# Patient Record
Sex: FEMALE | Race: WHITE | NOT HISPANIC OR LATINO
[De-identification: names, ages, dates, MRNs, and addresses within clinical notes are randomized per-mention and may not be internally consistent; named-entity substitution may affect disease eponyms.]

---

## 2018-01-31 PROBLEM — Z00.00 ENCOUNTER FOR PREVENTIVE HEALTH EXAMINATION: Status: ACTIVE | Noted: 2018-01-31

## 2018-02-20 ENCOUNTER — APPOINTMENT (OUTPATIENT)
Dept: ORTHOPEDIC SURGERY | Facility: CLINIC | Age: 78
End: 2018-02-20
Payer: COMMERCIAL

## 2018-02-20 VITALS — WEIGHT: 185 LBS | BODY MASS INDEX: 36.32 KG/M2 | HEIGHT: 60 IN

## 2018-02-20 DIAGNOSIS — Z87.891 PERSONAL HISTORY OF NICOTINE DEPENDENCE: ICD-10-CM

## 2018-02-20 DIAGNOSIS — Z85.9 PERSONAL HISTORY OF MALIGNANT NEOPLASM, UNSPECIFIED: ICD-10-CM

## 2018-02-20 DIAGNOSIS — M12.812 OTHER SPECIFIC ARTHROPATHIES, NOT ELSEWHERE CLASSIFIED, LEFT SHOULDER: ICD-10-CM

## 2018-02-20 DIAGNOSIS — Z87.39 PERSONAL HISTORY OF OTHER DISEASES OF THE MUSCULOSKELETAL SYSTEM AND CONNECTIVE TISSUE: ICD-10-CM

## 2018-02-20 DIAGNOSIS — Z86.39 PERSONAL HISTORY OF OTHER ENDOCRINE, NUTRITIONAL AND METABOLIC DISEASE: ICD-10-CM

## 2018-02-20 DIAGNOSIS — Z80.9 FAMILY HISTORY OF MALIGNANT NEOPLASM, UNSPECIFIED: ICD-10-CM

## 2018-02-20 DIAGNOSIS — Z82.62 FAMILY HISTORY OF OSTEOPOROSIS: ICD-10-CM

## 2018-02-20 DIAGNOSIS — Z82.61 FAMILY HISTORY OF ARTHRITIS: ICD-10-CM

## 2018-02-20 DIAGNOSIS — Z78.9 OTHER SPECIFIED HEALTH STATUS: ICD-10-CM

## 2018-02-20 PROCEDURE — 99204 OFFICE O/P NEW MOD 45 MIN: CPT

## 2018-02-20 RX ORDER — ZOLPIDEM TARTRATE 5 MG/1
5 TABLET ORAL
Refills: 0 | Status: ACTIVE | COMMUNITY

## 2018-02-20 RX ORDER — PANTOPRAZOLE 40 MG/1
TABLET, DELAYED RELEASE ORAL
Refills: 0 | Status: ACTIVE | COMMUNITY

## 2018-02-20 RX ORDER — SODIUM CHLORIDE 50 MG/G
5 OINTMENT OPHTHALMIC
Refills: 0 | Status: ACTIVE | COMMUNITY

## 2018-02-20 RX ORDER — ATORVASTATIN CALCIUM 10 MG/1
10 TABLET, FILM COATED ORAL
Refills: 0 | Status: ACTIVE | COMMUNITY

## 2018-02-20 RX ORDER — TRIAMTERENE AND HYDROCHLOROTHIAZIDE 25; 37.5 MG/1; MG/1
37.5-25 TABLET ORAL
Refills: 0 | Status: ACTIVE | COMMUNITY

## 2018-02-20 RX ORDER — UBIDECARENONE/VIT E ACET 100MG-5
50 MCG CAPSULE ORAL
Refills: 0 | Status: ACTIVE | COMMUNITY

## 2018-02-20 RX ORDER — ASPIRIN 81 MG
81 TABLET,CHEWABLE ORAL
Refills: 0 | Status: ACTIVE | COMMUNITY

## 2018-02-20 RX ORDER — ESCITALOPRAM OXALATE 20 MG/1
20 TABLET ORAL
Refills: 0 | Status: ACTIVE | COMMUNITY

## 2018-02-21 PROBLEM — M12.812 ROTATOR CUFF TEAR ARTHROPATHY, LEFT: Status: ACTIVE | Noted: 2018-02-21

## 2019-08-19 ENCOUNTER — APPOINTMENT (OUTPATIENT)
Dept: ORTHOPEDIC SURGERY | Facility: CLINIC | Age: 79
End: 2019-08-19
Payer: COMMERCIAL

## 2019-08-19 VITALS — HEART RATE: 74 BPM | DIASTOLIC BLOOD PRESSURE: 77 MMHG | SYSTOLIC BLOOD PRESSURE: 127 MMHG

## 2019-08-19 DIAGNOSIS — M79.652 PAIN IN LEFT THIGH: ICD-10-CM

## 2019-08-19 DIAGNOSIS — Z96.652 PRESENCE OF LEFT ARTIFICIAL KNEE JOINT: ICD-10-CM

## 2019-08-19 PROCEDURE — 99213 OFFICE O/P EST LOW 20 MIN: CPT

## 2019-08-19 PROCEDURE — 73501 X-RAY EXAM HIP UNI 1 VIEW: CPT | Mod: 59

## 2019-08-19 PROCEDURE — 73562 X-RAY EXAM OF KNEE 3: CPT | Mod: LT

## 2019-08-19 PROCEDURE — 73552 X-RAY EXAM OF FEMUR 2/>: CPT | Mod: LT

## 2023-12-28 ENCOUNTER — INPATIENT (INPATIENT)
Facility: HOSPITAL | Age: 83
LOS: 1 days | Discharge: HOME | DRG: 312 | End: 2023-12-30
Attending: INTERNAL MEDICINE | Admitting: INTERNAL MEDICINE
Payer: MEDICARE

## 2023-12-28 VITALS
HEART RATE: 96 BPM | WEIGHT: 175.05 LBS | RESPIRATION RATE: 19 BRPM | DIASTOLIC BLOOD PRESSURE: 54 MMHG | SYSTOLIC BLOOD PRESSURE: 143 MMHG | OXYGEN SATURATION: 98 % | HEIGHT: 60 IN | TEMPERATURE: 98 F

## 2023-12-28 DIAGNOSIS — R55 SYNCOPE AND COLLAPSE: ICD-10-CM

## 2023-12-28 DIAGNOSIS — Z90.710 ACQUIRED ABSENCE OF BOTH CERVIX AND UTERUS: Chronic | ICD-10-CM

## 2023-12-28 DIAGNOSIS — Z96.619 PRESENCE OF UNSPECIFIED ARTIFICIAL SHOULDER JOINT: Chronic | ICD-10-CM

## 2023-12-28 DIAGNOSIS — R29.6 REPEATED FALLS: ICD-10-CM

## 2023-12-28 DIAGNOSIS — Z96.659 PRESENCE OF UNSPECIFIED ARTIFICIAL KNEE JOINT: Chronic | ICD-10-CM

## 2023-12-28 DIAGNOSIS — I10 ESSENTIAL (PRIMARY) HYPERTENSION: ICD-10-CM

## 2023-12-28 LAB
ALBUMIN SERPL ELPH-MCNC: 3.5 G/DL — SIGNIFICANT CHANGE UP (ref 3.3–5)
ALBUMIN SERPL ELPH-MCNC: 3.5 G/DL — SIGNIFICANT CHANGE UP (ref 3.3–5)
ALP SERPL-CCNC: 89 U/L — SIGNIFICANT CHANGE UP (ref 40–120)
ALP SERPL-CCNC: 89 U/L — SIGNIFICANT CHANGE UP (ref 40–120)
ALT FLD-CCNC: 16 U/L — SIGNIFICANT CHANGE UP (ref 10–45)
ALT FLD-CCNC: 16 U/L — SIGNIFICANT CHANGE UP (ref 10–45)
ANION GAP SERPL CALC-SCNC: 10 MMOL/L — SIGNIFICANT CHANGE UP (ref 5–17)
ANION GAP SERPL CALC-SCNC: 10 MMOL/L — SIGNIFICANT CHANGE UP (ref 5–17)
APTT BLD: 32.8 SEC — SIGNIFICANT CHANGE UP (ref 24.5–35.6)
APTT BLD: 32.8 SEC — SIGNIFICANT CHANGE UP (ref 24.5–35.6)
AST SERPL-CCNC: 18 U/L — SIGNIFICANT CHANGE UP (ref 10–40)
AST SERPL-CCNC: 18 U/L — SIGNIFICANT CHANGE UP (ref 10–40)
BASE EXCESS BLDV CALC-SCNC: 2.7 MMOL/L — SIGNIFICANT CHANGE UP (ref -2–3)
BASE EXCESS BLDV CALC-SCNC: 2.7 MMOL/L — SIGNIFICANT CHANGE UP (ref -2–3)
BASOPHILS # BLD AUTO: 0.04 K/UL — SIGNIFICANT CHANGE UP (ref 0–0.2)
BASOPHILS # BLD AUTO: 0.04 K/UL — SIGNIFICANT CHANGE UP (ref 0–0.2)
BASOPHILS NFR BLD AUTO: 0.4 % — SIGNIFICANT CHANGE UP (ref 0–2)
BASOPHILS NFR BLD AUTO: 0.4 % — SIGNIFICANT CHANGE UP (ref 0–2)
BILIRUB SERPL-MCNC: 0.5 MG/DL — SIGNIFICANT CHANGE UP (ref 0.2–1.2)
BILIRUB SERPL-MCNC: 0.5 MG/DL — SIGNIFICANT CHANGE UP (ref 0.2–1.2)
BUN SERPL-MCNC: 25 MG/DL — HIGH (ref 7–23)
BUN SERPL-MCNC: 25 MG/DL — HIGH (ref 7–23)
CA-I SERPL-SCNC: 1.2 MMOL/L — SIGNIFICANT CHANGE UP (ref 1.15–1.33)
CA-I SERPL-SCNC: 1.2 MMOL/L — SIGNIFICANT CHANGE UP (ref 1.15–1.33)
CALCIUM SERPL-MCNC: 9.1 MG/DL — SIGNIFICANT CHANGE UP (ref 8.4–10.5)
CALCIUM SERPL-MCNC: 9.1 MG/DL — SIGNIFICANT CHANGE UP (ref 8.4–10.5)
CHLORIDE BLDV-SCNC: 104 MMOL/L — SIGNIFICANT CHANGE UP (ref 96–108)
CHLORIDE BLDV-SCNC: 104 MMOL/L — SIGNIFICANT CHANGE UP (ref 96–108)
CHLORIDE SERPL-SCNC: 104 MMOL/L — SIGNIFICANT CHANGE UP (ref 96–108)
CHLORIDE SERPL-SCNC: 104 MMOL/L — SIGNIFICANT CHANGE UP (ref 96–108)
CO2 BLDV-SCNC: 29 MMOL/L — HIGH (ref 22–26)
CO2 BLDV-SCNC: 29 MMOL/L — HIGH (ref 22–26)
CO2 SERPL-SCNC: 27 MMOL/L — SIGNIFICANT CHANGE UP (ref 22–31)
CO2 SERPL-SCNC: 27 MMOL/L — SIGNIFICANT CHANGE UP (ref 22–31)
CREAT SERPL-MCNC: 0.74 MG/DL — SIGNIFICANT CHANGE UP (ref 0.5–1.3)
CREAT SERPL-MCNC: 0.74 MG/DL — SIGNIFICANT CHANGE UP (ref 0.5–1.3)
EGFR: 81 ML/MIN/1.73M2 — SIGNIFICANT CHANGE UP
EGFR: 81 ML/MIN/1.73M2 — SIGNIFICANT CHANGE UP
EOSINOPHIL # BLD AUTO: 0.04 K/UL — SIGNIFICANT CHANGE UP (ref 0–0.5)
EOSINOPHIL # BLD AUTO: 0.04 K/UL — SIGNIFICANT CHANGE UP (ref 0–0.5)
EOSINOPHIL NFR BLD AUTO: 0.4 % — SIGNIFICANT CHANGE UP (ref 0–6)
EOSINOPHIL NFR BLD AUTO: 0.4 % — SIGNIFICANT CHANGE UP (ref 0–6)
FLUAV AG NPH QL: SIGNIFICANT CHANGE UP
FLUAV AG NPH QL: SIGNIFICANT CHANGE UP
FLUBV AG NPH QL: SIGNIFICANT CHANGE UP
FLUBV AG NPH QL: SIGNIFICANT CHANGE UP
GAS PNL BLDV: 136 MMOL/L — SIGNIFICANT CHANGE UP (ref 136–145)
GAS PNL BLDV: 136 MMOL/L — SIGNIFICANT CHANGE UP (ref 136–145)
GAS PNL BLDV: SIGNIFICANT CHANGE UP
GAS PNL BLDV: SIGNIFICANT CHANGE UP
GLUCOSE BLDV-MCNC: 121 MG/DL — HIGH (ref 70–99)
GLUCOSE BLDV-MCNC: 121 MG/DL — HIGH (ref 70–99)
GLUCOSE SERPL-MCNC: 124 MG/DL — HIGH (ref 70–99)
GLUCOSE SERPL-MCNC: 124 MG/DL — HIGH (ref 70–99)
HCO3 BLDV-SCNC: 28 MMOL/L — SIGNIFICANT CHANGE UP (ref 22–29)
HCO3 BLDV-SCNC: 28 MMOL/L — SIGNIFICANT CHANGE UP (ref 22–29)
HCT VFR BLD CALC: 30.8 % — LOW (ref 34.5–45)
HCT VFR BLD CALC: 30.8 % — LOW (ref 34.5–45)
HCT VFR BLDA CALC: 30 % — LOW (ref 34.5–46.5)
HCT VFR BLDA CALC: 30 % — LOW (ref 34.5–46.5)
HGB BLD CALC-MCNC: 10.1 G/DL — LOW (ref 11.7–16.1)
HGB BLD CALC-MCNC: 10.1 G/DL — LOW (ref 11.7–16.1)
HGB BLD-MCNC: 10.3 G/DL — LOW (ref 11.5–15.5)
HGB BLD-MCNC: 10.3 G/DL — LOW (ref 11.5–15.5)
IMM GRANULOCYTES NFR BLD AUTO: 0.7 % — SIGNIFICANT CHANGE UP (ref 0–0.9)
IMM GRANULOCYTES NFR BLD AUTO: 0.7 % — SIGNIFICANT CHANGE UP (ref 0–0.9)
INR BLD: 1.06 RATIO — SIGNIFICANT CHANGE UP (ref 0.85–1.18)
INR BLD: 1.06 RATIO — SIGNIFICANT CHANGE UP (ref 0.85–1.18)
LACTATE BLDV-MCNC: 1.5 MMOL/L — SIGNIFICANT CHANGE UP (ref 0.5–2)
LACTATE BLDV-MCNC: 1.5 MMOL/L — SIGNIFICANT CHANGE UP (ref 0.5–2)
LYMPHOCYTES # BLD AUTO: 1 K/UL — SIGNIFICANT CHANGE UP (ref 1–3.3)
LYMPHOCYTES # BLD AUTO: 1 K/UL — SIGNIFICANT CHANGE UP (ref 1–3.3)
LYMPHOCYTES # BLD AUTO: 11.1 % — LOW (ref 13–44)
LYMPHOCYTES # BLD AUTO: 11.1 % — LOW (ref 13–44)
MAGNESIUM SERPL-MCNC: 2 MG/DL — SIGNIFICANT CHANGE UP (ref 1.6–2.6)
MAGNESIUM SERPL-MCNC: 2 MG/DL — SIGNIFICANT CHANGE UP (ref 1.6–2.6)
MCHC RBC-ENTMCNC: 28.8 PG — SIGNIFICANT CHANGE UP (ref 27–34)
MCHC RBC-ENTMCNC: 28.8 PG — SIGNIFICANT CHANGE UP (ref 27–34)
MCHC RBC-ENTMCNC: 33.4 GM/DL — SIGNIFICANT CHANGE UP (ref 32–36)
MCHC RBC-ENTMCNC: 33.4 GM/DL — SIGNIFICANT CHANGE UP (ref 32–36)
MCV RBC AUTO: 86 FL — SIGNIFICANT CHANGE UP (ref 80–100)
MCV RBC AUTO: 86 FL — SIGNIFICANT CHANGE UP (ref 80–100)
MONOCYTES # BLD AUTO: 0.53 K/UL — SIGNIFICANT CHANGE UP (ref 0–0.9)
MONOCYTES # BLD AUTO: 0.53 K/UL — SIGNIFICANT CHANGE UP (ref 0–0.9)
MONOCYTES NFR BLD AUTO: 5.9 % — SIGNIFICANT CHANGE UP (ref 2–14)
MONOCYTES NFR BLD AUTO: 5.9 % — SIGNIFICANT CHANGE UP (ref 2–14)
NEUTROPHILS # BLD AUTO: 7.34 K/UL — SIGNIFICANT CHANGE UP (ref 1.8–7.4)
NEUTROPHILS # BLD AUTO: 7.34 K/UL — SIGNIFICANT CHANGE UP (ref 1.8–7.4)
NEUTROPHILS NFR BLD AUTO: 81.5 % — HIGH (ref 43–77)
NEUTROPHILS NFR BLD AUTO: 81.5 % — HIGH (ref 43–77)
NRBC # BLD: 0 /100 WBCS — SIGNIFICANT CHANGE UP (ref 0–0)
NRBC # BLD: 0 /100 WBCS — SIGNIFICANT CHANGE UP (ref 0–0)
NT-PROBNP SERPL-SCNC: 667 PG/ML — HIGH (ref 0–300)
NT-PROBNP SERPL-SCNC: 667 PG/ML — HIGH (ref 0–300)
PCO2 BLDV: 45 MMHG — HIGH (ref 39–42)
PCO2 BLDV: 45 MMHG — HIGH (ref 39–42)
PH BLDV: 7.4 — SIGNIFICANT CHANGE UP (ref 7.32–7.43)
PH BLDV: 7.4 — SIGNIFICANT CHANGE UP (ref 7.32–7.43)
PHOSPHATE SERPL-MCNC: 3.9 MG/DL — SIGNIFICANT CHANGE UP (ref 2.5–4.5)
PHOSPHATE SERPL-MCNC: 3.9 MG/DL — SIGNIFICANT CHANGE UP (ref 2.5–4.5)
PLATELET # BLD AUTO: 189 K/UL — SIGNIFICANT CHANGE UP (ref 150–400)
PLATELET # BLD AUTO: 189 K/UL — SIGNIFICANT CHANGE UP (ref 150–400)
PO2 BLDV: 50 MMHG — HIGH (ref 25–45)
PO2 BLDV: 50 MMHG — HIGH (ref 25–45)
POTASSIUM BLDV-SCNC: 4 MMOL/L — SIGNIFICANT CHANGE UP (ref 3.5–5.1)
POTASSIUM BLDV-SCNC: 4 MMOL/L — SIGNIFICANT CHANGE UP (ref 3.5–5.1)
POTASSIUM SERPL-MCNC: 4 MMOL/L — SIGNIFICANT CHANGE UP (ref 3.5–5.3)
POTASSIUM SERPL-MCNC: 4 MMOL/L — SIGNIFICANT CHANGE UP (ref 3.5–5.3)
POTASSIUM SERPL-SCNC: 4 MMOL/L — SIGNIFICANT CHANGE UP (ref 3.5–5.3)
POTASSIUM SERPL-SCNC: 4 MMOL/L — SIGNIFICANT CHANGE UP (ref 3.5–5.3)
PROT SERPL-MCNC: 5.5 G/DL — LOW (ref 6–8.3)
PROT SERPL-MCNC: 5.5 G/DL — LOW (ref 6–8.3)
PROTHROM AB SERPL-ACNC: 11.1 SEC — SIGNIFICANT CHANGE UP (ref 9.5–13)
PROTHROM AB SERPL-ACNC: 11.1 SEC — SIGNIFICANT CHANGE UP (ref 9.5–13)
RBC # BLD: 3.58 M/UL — LOW (ref 3.8–5.2)
RBC # BLD: 3.58 M/UL — LOW (ref 3.8–5.2)
RBC # FLD: 13.7 % — SIGNIFICANT CHANGE UP (ref 10.3–14.5)
RBC # FLD: 13.7 % — SIGNIFICANT CHANGE UP (ref 10.3–14.5)
RSV RNA NPH QL NAA+NON-PROBE: SIGNIFICANT CHANGE UP
RSV RNA NPH QL NAA+NON-PROBE: SIGNIFICANT CHANGE UP
SAO2 % BLDV: 81.4 % — SIGNIFICANT CHANGE UP (ref 67–88)
SAO2 % BLDV: 81.4 % — SIGNIFICANT CHANGE UP (ref 67–88)
SARS-COV-2 RNA SPEC QL NAA+PROBE: SIGNIFICANT CHANGE UP
SARS-COV-2 RNA SPEC QL NAA+PROBE: SIGNIFICANT CHANGE UP
SODIUM SERPL-SCNC: 141 MMOL/L — SIGNIFICANT CHANGE UP (ref 135–145)
SODIUM SERPL-SCNC: 141 MMOL/L — SIGNIFICANT CHANGE UP (ref 135–145)
TROPONIN T, HIGH SENSITIVITY RESULT: 15 NG/L — SIGNIFICANT CHANGE UP (ref 0–51)
TROPONIN T, HIGH SENSITIVITY RESULT: 15 NG/L — SIGNIFICANT CHANGE UP (ref 0–51)
TSH SERPL-MCNC: 3.25 UIU/ML — SIGNIFICANT CHANGE UP (ref 0.27–4.2)
TSH SERPL-MCNC: 3.25 UIU/ML — SIGNIFICANT CHANGE UP (ref 0.27–4.2)
WBC # BLD: 9.01 K/UL — SIGNIFICANT CHANGE UP (ref 3.8–10.5)
WBC # BLD: 9.01 K/UL — SIGNIFICANT CHANGE UP (ref 3.8–10.5)
WBC # FLD AUTO: 9.01 K/UL — SIGNIFICANT CHANGE UP (ref 3.8–10.5)
WBC # FLD AUTO: 9.01 K/UL — SIGNIFICANT CHANGE UP (ref 3.8–10.5)

## 2023-12-28 PROCEDURE — 70450 CT HEAD/BRAIN W/O DYE: CPT | Mod: 26,MA

## 2023-12-28 PROCEDURE — 73130 X-RAY EXAM OF HAND: CPT | Mod: 26,RT

## 2023-12-28 PROCEDURE — 99285 EMERGENCY DEPT VISIT HI MDM: CPT | Mod: FS

## 2023-12-28 PROCEDURE — 73502 X-RAY EXAM HIP UNI 2-3 VIEWS: CPT | Mod: 26,RT

## 2023-12-28 PROCEDURE — 93880 EXTRACRANIAL BILAT STUDY: CPT | Mod: 26

## 2023-12-28 PROCEDURE — 73080 X-RAY EXAM OF ELBOW: CPT | Mod: 26,RT

## 2023-12-28 PROCEDURE — 71045 X-RAY EXAM CHEST 1 VIEW: CPT | Mod: 26

## 2023-12-28 PROCEDURE — 73030 X-RAY EXAM OF SHOULDER: CPT | Mod: 26,RT

## 2023-12-28 PROCEDURE — 73060 X-RAY EXAM OF HUMERUS: CPT | Mod: 26,RT

## 2023-12-28 PROCEDURE — 73090 X-RAY EXAM OF FOREARM: CPT | Mod: 26,LT

## 2023-12-28 PROCEDURE — 73110 X-RAY EXAM OF WRIST: CPT | Mod: 26,RT

## 2023-12-28 PROCEDURE — 73552 X-RAY EXAM OF FEMUR 2/>: CPT | Mod: 26,RT

## 2023-12-28 PROCEDURE — 70486 CT MAXILLOFACIAL W/O DYE: CPT | Mod: 26,MA

## 2023-12-28 PROCEDURE — 76377 3D RENDER W/INTRP POSTPROCES: CPT | Mod: 26

## 2023-12-28 RX ORDER — OXYBUTYNIN CHLORIDE 5 MG
10 TABLET ORAL DAILY
Refills: 0 | Status: DISCONTINUED | OUTPATIENT
Start: 2023-12-28 | End: 2023-12-28

## 2023-12-28 RX ORDER — ENOXAPARIN SODIUM 100 MG/ML
40 INJECTION SUBCUTANEOUS EVERY 24 HOURS
Refills: 0 | Status: DISCONTINUED | OUTPATIENT
Start: 2023-12-28 | End: 2023-12-30

## 2023-12-28 RX ORDER — DICLOFENAC SODIUM 75 MG/1
75 TABLET, DELAYED RELEASE ORAL DAILY
Refills: 0 | Status: DISCONTINUED | OUTPATIENT
Start: 2023-12-28 | End: 2023-12-30

## 2023-12-28 RX ORDER — ATORVASTATIN CALCIUM 80 MG/1
20 TABLET, FILM COATED ORAL AT BEDTIME
Refills: 0 | Status: DISCONTINUED | OUTPATIENT
Start: 2023-12-28 | End: 2023-12-30

## 2023-12-28 RX ORDER — AMLODIPINE BESYLATE 2.5 MG/1
5 TABLET ORAL DAILY
Refills: 0 | Status: DISCONTINUED | OUTPATIENT
Start: 2023-12-28 | End: 2023-12-29

## 2023-12-28 RX ORDER — ASPIRIN/CALCIUM CARB/MAGNESIUM 324 MG
81 TABLET ORAL DAILY
Refills: 0 | Status: DISCONTINUED | OUTPATIENT
Start: 2023-12-28 | End: 2023-12-30

## 2023-12-28 RX ORDER — ACETAMINOPHEN 500 MG
1000 TABLET ORAL ONCE
Refills: 0 | Status: COMPLETED | OUTPATIENT
Start: 2023-12-28 | End: 2023-12-28

## 2023-12-28 RX ORDER — TRIAMTERENE/HYDROCHLOROTHIAZID 75 MG-50MG
1 TABLET ORAL DAILY
Refills: 0 | Status: DISCONTINUED | OUTPATIENT
Start: 2023-12-28 | End: 2023-12-29

## 2023-12-28 RX ORDER — ZOLPIDEM TARTRATE 10 MG/1
5 TABLET ORAL AT BEDTIME
Refills: 0 | Status: DISCONTINUED | OUTPATIENT
Start: 2023-12-28 | End: 2023-12-30

## 2023-12-28 RX ORDER — OXYBUTYNIN CHLORIDE 5 MG
10 TABLET ORAL DAILY
Refills: 0 | Status: DISCONTINUED | OUTPATIENT
Start: 2023-12-28 | End: 2023-12-30

## 2023-12-28 RX ORDER — VENLAFAXINE HCL 75 MG
150 CAPSULE, EXT RELEASE 24 HR ORAL DAILY
Refills: 0 | Status: DISCONTINUED | OUTPATIENT
Start: 2023-12-28 | End: 2023-12-30

## 2023-12-28 RX ADMIN — Medication 400 MILLIGRAM(S): at 16:10

## 2023-12-28 NOTE — ED ADULT TRIAGE NOTE - TEMPERATURE IN FAHRENHEIT (DEGREES F)
[FreeTextEntry1] : We discussed treatment options observation, bracing, and surgery.\par His gait is well within normal. He has a mild internal roattion of his foot progression angle\par The rest of his exam is normal\par I reassured mom and suggested she follow up with PCP\par 
98

## 2023-12-28 NOTE — ED PROVIDER NOTE - CLINICAL SUMMARY MEDICAL DECISION MAKING FREE TEXT BOX
Attending Elis Marley: 83 yo female with multiple medical issues presenting after fall. upon arrival gcs 1, primary survey intact. pt unclear how she fell. concern for possible syncopal episode. pt denies any current sob or difficulty breahtihng making PE as cause of syncopal episode less likely. no chest wall ttp and no evidence of hypoxia making rib fracture less likely. abdomen soft and nonteonder. ekg without evidence of st elevation or depression. pt doesh ave a murmur on exam. pt placed on monitor. pt does have some ecchmoes to hip. able to range and flex her legs making fracture less likely however with ecchymoses will obtain xrays. plan for labs, check troponin, ct head, and cardiac monitor. pt may reuquire admission for syncopal work up Attending Elis Marley: 81 yo female with multiple medical issues presenting after fall. upon arrival gcs 1, primary survey intact. pt unclear how she fell. concern for possible syncopal episode. pt denies any current sob or difficulty breahtihng making PE as cause of syncopal episode less likely. no chest wall ttp and no evidence of hypoxia making rib fracture less likely. abdomen soft and nonteonder. ekg without evidence of st elevation or depression. pt doesh ave a murmur on exam. pt placed on monitor. pt does have some ecchmoes to hip. able to range and flex her legs making fracture less likely however with ecchymoses will obtain xrays. plan for labs, check troponin, ct head, and cardiac monitor. pt may reuquire admission for syncopal work up

## 2023-12-28 NOTE — ED PROVIDER NOTE - ATTENDING APP SHARED VISIT CONTRIBUTION OF CARE
Attending MD Elis Marley:   I personally have seen and examined this patient.  Physician assistant note reviewed and agree on plan of care and except where noted.  See HPI, PE, and MDM for details.

## 2023-12-28 NOTE — ED PROVIDER NOTE - NSICDXPASTSURGICALHX_GEN_ALL_CORE_FT
PAST SURGICAL HISTORY:  H/O: hysterectomy     History of knee replacement     History of total shoulder replacement

## 2023-12-28 NOTE — ED PROVIDER NOTE - PHYSICAL EXAMINATION
skin ecchymoses to right lateral thigh skin ecchymoses to right lateral thigh  Attending Elis Marley: Gen: NAD, heent: atrauamtic, eomi, mmm,  neck; nttp, no nuchal rigidity, chest: nttp, no crepitus, cv: rrr, +murmur, lungs: ctab, abd: soft, nontender, nondistended, no peritoneal signs, , no guarding, ext: wwp,ecchymoses to right thigh skin: no rash, neuro: awake and alert, following commands, speech clear, sensation and strength intact, no focal deficits

## 2023-12-28 NOTE — ED ADULT NURSE NOTE - OBJECTIVE STATEMENT
82F [id number] ambulatory with h/o  HTN, diverticulitis  c/b abscess requiring hemicolectomy further c/b empyema requiring thoracotomy  HLD, PSx hysterectomy, b/l shoulder replacement, b/l knee replacement now presenting to the ED via EMS s/p unwitnessed fall today now c/o RUE pain. Patient reported she had mechanical fall onto right side this morning with carpeted floor  this morning with head strike and was assisted back into bed by EMS. Patient then had another fall but cannot recall how or when she fell. Patient able to ambulate after the fall. Noted bruising in the left arm.  Left wrist 20g IV access noted inserted by EMS. Due labs sent pending results. Pain meds given as ordered and tolerated well.   Sent to radiology for evaluation.

## 2023-12-28 NOTE — H&P ADULT - HISTORY OF PRESENT ILLNESS
83 y/o female PMHx HTN, divertic c/b abscess requiring hemicolectomy further c/b empyema requiring thoracotomy  HLD, PSx hysterectomy, b/l shoulder replacement, b/l knee replacement now presenting to the ED via EMS s/p unwitnessed fall today now c/o RUE pain. Patient reported she had mechanical fall onto right side this morning with carpeted floor  this morning with headstrike and was assisted back into bed by EMS. Patient then had another fall but cannot recall how or when she fell. Patient was able to ambulate after the fall. Does not ambulate with assisted devices inside the house and lives by herself with family nearby. Denied CP, SOB, abdominal pain, N/V/D, headache, fever, cough, urinary symptoms, urinary fecal incontinence, back pain, neck pain, pelvic pain 81 y/o female PMHx HTN, divertic c/b abscess requiring hemicolectomy further c/b empyema requiring thoracotomy  HLD, PSx hysterectomy, b/l shoulder replacement, b/l knee replacement now presenting to the ED via EMS s/p unwitnessed fall today now c/o RUE pain. Patient reported she had mechanical fall onto right side this morning with carpeted floor  this morning with headstrike and was assisted back into bed by EMS. Patient then had another fall but cannot recall how or when she fell. Patient was able to ambulate after the fall. Does not ambulate with assisted devices inside the house and lives by herself with family nearby. Denied CP, SOB, abdominal pain, N/V/D, headache, fever, cough, urinary symptoms, urinary fecal incontinence, back pain, neck pain, pelvic pain

## 2023-12-28 NOTE — H&P ADULT - PROBLEM SELECTOR PLAN 1
< from: CT 3D Reconstruct w/ Workstation (12.28.23 @ 17:29) >      IMPRESSION:    CT HEAD: No acute intracranial hemorrhage    CT MAXILLOFACIAL: No acute facial or orbital fracture.    < end of copied text >

## 2023-12-28 NOTE — ED ADULT NURSE NOTE - NS ED NURSE REPORT GIVEN DT
[Follow-Up Visit] : a follow-up [ Service] : provided by  Service [FreeTextEntry2] : Milagros [TWNoteComboBox1] : American Sign Language 28-Dec-2023 20:55

## 2023-12-28 NOTE — ED ADULT NURSE NOTE - NSFALLHARMRISKINTERV_ED_ALL_ED
Assistance OOB with selected safe patient handling equipment if applicable/Communicate risk of Fall with Harm to all staff, patient, and family/Monitor gait and stability/Provide patient with walking aids/Provide visual cue: red socks, yellow wristband, yellow gown, etc/Reinforce activity limits and safety measures with patient and family/Bed in lowest position, wheels locked, appropriate side rails in place/Call bell, personal items and telephone in reach/Instruct patient to call for assistance before getting out of bed/chair/stretcher/Non-slip footwear applied when patient is off stretcher/[place] to call system/Physically safe environment - no spills, clutter or unnecessary equipment/Purposeful Proactive Rounding/Room/bathroom lighting operational, light cord in reach Assistance OOB with selected safe patient handling equipment if applicable/Communicate risk of Fall with Harm to all staff, patient, and family/Monitor gait and stability/Provide patient with walking aids/Provide visual cue: red socks, yellow wristband, yellow gown, etc/Reinforce activity limits and safety measures with patient and family/Bed in lowest position, wheels locked, appropriate side rails in place/Call bell, personal items and telephone in reach/Instruct patient to call for assistance before getting out of bed/chair/stretcher/Non-slip footwear applied when patient is off stretcher/[place] to call system/Physically safe environment - no spills, clutter or unnecessary equipment/Purposeful Proactive Rounding/Room/bathroom lighting operational, light cord in reach

## 2023-12-28 NOTE — H&P ADULT - PROBLEM SELECTOR PLAN 2
CT head noted.   Admitted to Tele to R/O Arrythmia .  Will check TTE and Carotid Doppler.  Cards and Neurology consulted.

## 2023-12-28 NOTE — ED PROVIDER NOTE - OBJECTIVE STATEMENT
81 y/o female PMHx HTN, divertic c/b abscess requiring hemicolectomy further c/b empyema requiring thoracotomy  HLD, PSx hysterectomy, b/l shoulder replacement, b/l knee replacement now presenting to the ED via EMS s/p unwitnessed fall today now c/o RUE pain. Patient reported she had mechanical fall onto right side this morning with carpeted floor  this morning with headstrike and was assisted back into bed by EMS. Patient then had another fall but cannot recall how or when she fell. Patient was able to ambulate after the fall. Does not ambulate with assisted devices inside the house and lives by herself with family nearby. Denied CP, SOB, abdominal pain, N/V/D, headache, fever, cough, urinary symptoms, urinary fecal incontinence, back pain, neck pain, pelvic pain 83 y/o female PMHx HTN, divertic c/b abscess requiring hemicolectomy further c/b empyema requiring thoracotomy  HLD, PSx hysterectomy, b/l shoulder replacement, b/l knee replacement now presenting to the ED via EMS s/p unwitnessed fall today now c/o RUE pain. Patient reported she had mechanical fall onto right side this morning with carpeted floor  this morning with headstrike and was assisted back into bed by EMS. Patient then had another fall but cannot recall how or when she fell. Patient was able to ambulate after the fall. Does not ambulate with assisted devices inside the house and lives by herself with family nearby. Denied CP, SOB, abdominal pain, N/V/D, headache, fever, cough, urinary symptoms, urinary fecal incontinence, back pain, neck pain, pelvic pain

## 2023-12-28 NOTE — ED ADULT TRIAGE NOTE - CHIEF COMPLAINT QUOTE
multiple falls today does not recall second fall  ecchymosis and pain to R elbow/shoulder  BP fluctuating from 70s/40s to 140s/60s

## 2023-12-28 NOTE — H&P ADULT - TIME BILLING
Admission H&P including bedside history ,examination ,reviewing test results and treatement plan. D/W patient's daughter and son . They want her home before sundown tomorrow. D/W ACP

## 2023-12-28 NOTE — H&P ADULT - ASSESSMENT
81 y/o female PMHx HTN, divertic c/b abscess requiring hemicolectomy further c/b empyema requiring thoracotomy  HLD, PSx hysterectomy, b/l shoulder replacement, b/l knee replacement now presenting to the ED via EMS s/p unwitnessed fall today now c/o RUE pain. Patient reported she had mechanical fall onto right side this morning with carpeted floor  this morning with headstrike and was assisted back into bed by EMS. Patient then had another fall but cannot recall how or when she fell. Patient was able to ambulate after the fall. Does not ambulate with assisted devices inside the house and lives by herself with family nearby. Denied CP, SOB, abdominal pain, N/V/D, headache, fever, cough, urinary symptoms, urinary fecal incontinence, back pain, neck pain, pelvic pain

## 2023-12-28 NOTE — ED PROVIDER NOTE - NS ED ATTENDING STATEMENT MOD
This was a shared visit with the COLTEN. I reviewed and verified the documentation and independently performed the documented:

## 2023-12-28 NOTE — PATIENT PROFILE ADULT - FALL HARM RISK - RISK INTERVENTIONS
Assistance OOB with selected safe patient handling equipment/Assistance with ambulation/Communicate Fall Risk and Risk Factors to all staff, patient, and family/Discuss with provider need for PT consult/Monitor gait and stability/Reinforce activity limits and safety measures with patient and family/Visual Cue: Yellow wristband/Bed in lowest position, wheels locked, appropriate side rails in place/Call bell, personal items and telephone in reach/Instruct patient to call for assistance before getting out of bed or chair/Non-slip footwear when patient is out of bed/[place] to call system/Physically safe environment - no spills, clutter or unnecessary equipment/Purposeful Proactive Rounding/Room/bathroom lighting operational, light cord in reach Assistance OOB with selected safe patient handling equipment/Assistance with ambulation/Communicate Fall Risk and Risk Factors to all staff, patient, and family/Discuss with provider need for PT consult/Monitor gait and stability/Reinforce activity limits and safety measures with patient and family/Visual Cue: Yellow wristband/Bed in lowest position, wheels locked, appropriate side rails in place/Call bell, personal items and telephone in reach/Instruct patient to call for assistance before getting out of bed or chair/Non-slip footwear when patient is out of bed/[place] to call system/Physically safe environment - no spills, clutter or unnecessary equipment/Purposeful Proactive Rounding/Room/bathroom lighting operational, light cord in reach

## 2023-12-29 LAB
ANION GAP SERPL CALC-SCNC: 11 MMOL/L — SIGNIFICANT CHANGE UP (ref 5–17)
APPEARANCE UR: CLEAR — SIGNIFICANT CHANGE UP
APPEARANCE UR: CLEAR — SIGNIFICANT CHANGE UP
BACTERIA # UR AUTO: NEGATIVE /HPF — SIGNIFICANT CHANGE UP
BACTERIA # UR AUTO: NEGATIVE /HPF — SIGNIFICANT CHANGE UP
BILIRUB UR-MCNC: NEGATIVE — SIGNIFICANT CHANGE UP
BILIRUB UR-MCNC: NEGATIVE — SIGNIFICANT CHANGE UP
BUN SERPL-MCNC: 25 MG/DL — HIGH (ref 7–23)
BUN SERPL-MCNC: 25 MG/DL — HIGH (ref 7–23)
BUN SERPL-MCNC: 28 MG/DL — HIGH (ref 7–23)
BUN SERPL-MCNC: 28 MG/DL — HIGH (ref 7–23)
CALCIUM SERPL-MCNC: 8.4 MG/DL — SIGNIFICANT CHANGE UP (ref 8.4–10.5)
CALCIUM SERPL-MCNC: 8.4 MG/DL — SIGNIFICANT CHANGE UP (ref 8.4–10.5)
CALCIUM SERPL-MCNC: 8.8 MG/DL — SIGNIFICANT CHANGE UP (ref 8.4–10.5)
CALCIUM SERPL-MCNC: 8.8 MG/DL — SIGNIFICANT CHANGE UP (ref 8.4–10.5)
CAST: 0 /LPF — SIGNIFICANT CHANGE UP (ref 0–4)
CAST: 0 /LPF — SIGNIFICANT CHANGE UP (ref 0–4)
CHLORIDE SERPL-SCNC: 101 MMOL/L — SIGNIFICANT CHANGE UP (ref 96–108)
CHLORIDE SERPL-SCNC: 101 MMOL/L — SIGNIFICANT CHANGE UP (ref 96–108)
CHLORIDE SERPL-SCNC: 103 MMOL/L — SIGNIFICANT CHANGE UP (ref 96–108)
CHLORIDE SERPL-SCNC: 103 MMOL/L — SIGNIFICANT CHANGE UP (ref 96–108)
CO2 SERPL-SCNC: 26 MMOL/L — SIGNIFICANT CHANGE UP (ref 22–31)
COLOR SPEC: YELLOW — SIGNIFICANT CHANGE UP
COLOR SPEC: YELLOW — SIGNIFICANT CHANGE UP
CREAT SERPL-MCNC: 0.66 MG/DL — SIGNIFICANT CHANGE UP (ref 0.5–1.3)
CREAT SERPL-MCNC: 0.66 MG/DL — SIGNIFICANT CHANGE UP (ref 0.5–1.3)
CREAT SERPL-MCNC: 0.83 MG/DL — SIGNIFICANT CHANGE UP (ref 0.5–1.3)
CREAT SERPL-MCNC: 0.83 MG/DL — SIGNIFICANT CHANGE UP (ref 0.5–1.3)
DIFF PNL FLD: NEGATIVE — SIGNIFICANT CHANGE UP
DIFF PNL FLD: NEGATIVE — SIGNIFICANT CHANGE UP
EGFR: 70 ML/MIN/1.73M2 — SIGNIFICANT CHANGE UP
EGFR: 70 ML/MIN/1.73M2 — SIGNIFICANT CHANGE UP
EGFR: 87 ML/MIN/1.73M2 — SIGNIFICANT CHANGE UP
EGFR: 87 ML/MIN/1.73M2 — SIGNIFICANT CHANGE UP
GLUCOSE SERPL-MCNC: 106 MG/DL — HIGH (ref 70–99)
GLUCOSE SERPL-MCNC: 106 MG/DL — HIGH (ref 70–99)
GLUCOSE SERPL-MCNC: 138 MG/DL — HIGH (ref 70–99)
GLUCOSE SERPL-MCNC: 138 MG/DL — HIGH (ref 70–99)
GLUCOSE UR QL: NEGATIVE MG/DL — SIGNIFICANT CHANGE UP
GLUCOSE UR QL: NEGATIVE MG/DL — SIGNIFICANT CHANGE UP
HCT VFR BLD CALC: 26.4 % — LOW (ref 34.5–45)
HCT VFR BLD CALC: 26.4 % — LOW (ref 34.5–45)
HCT VFR BLD CALC: 29.1 % — LOW (ref 34.5–45)
HCT VFR BLD CALC: 29.1 % — LOW (ref 34.5–45)
HGB BLD-MCNC: 8.8 G/DL — LOW (ref 11.5–15.5)
HGB BLD-MCNC: 8.8 G/DL — LOW (ref 11.5–15.5)
HGB BLD-MCNC: 9.6 G/DL — LOW (ref 11.5–15.5)
HGB BLD-MCNC: 9.6 G/DL — LOW (ref 11.5–15.5)
KETONES UR-MCNC: NEGATIVE MG/DL — SIGNIFICANT CHANGE UP
KETONES UR-MCNC: NEGATIVE MG/DL — SIGNIFICANT CHANGE UP
LEUKOCYTE ESTERASE UR-ACNC: NEGATIVE — SIGNIFICANT CHANGE UP
LEUKOCYTE ESTERASE UR-ACNC: NEGATIVE — SIGNIFICANT CHANGE UP
MCHC RBC-ENTMCNC: 28.7 PG — SIGNIFICANT CHANGE UP (ref 27–34)
MCHC RBC-ENTMCNC: 28.7 PG — SIGNIFICANT CHANGE UP (ref 27–34)
MCHC RBC-ENTMCNC: 29.2 PG — SIGNIFICANT CHANGE UP (ref 27–34)
MCHC RBC-ENTMCNC: 29.2 PG — SIGNIFICANT CHANGE UP (ref 27–34)
MCHC RBC-ENTMCNC: 33 GM/DL — SIGNIFICANT CHANGE UP (ref 32–36)
MCHC RBC-ENTMCNC: 33 GM/DL — SIGNIFICANT CHANGE UP (ref 32–36)
MCHC RBC-ENTMCNC: 33.3 GM/DL — SIGNIFICANT CHANGE UP (ref 32–36)
MCHC RBC-ENTMCNC: 33.3 GM/DL — SIGNIFICANT CHANGE UP (ref 32–36)
MCV RBC AUTO: 86.9 FL — SIGNIFICANT CHANGE UP (ref 80–100)
MCV RBC AUTO: 86.9 FL — SIGNIFICANT CHANGE UP (ref 80–100)
MCV RBC AUTO: 87.7 FL — SIGNIFICANT CHANGE UP (ref 80–100)
MCV RBC AUTO: 87.7 FL — SIGNIFICANT CHANGE UP (ref 80–100)
NITRITE UR-MCNC: NEGATIVE — SIGNIFICANT CHANGE UP
NITRITE UR-MCNC: NEGATIVE — SIGNIFICANT CHANGE UP
NRBC # BLD: 0 /100 WBCS — SIGNIFICANT CHANGE UP (ref 0–0)
PH UR: 6.5 — SIGNIFICANT CHANGE UP (ref 5–8)
PH UR: 6.5 — SIGNIFICANT CHANGE UP (ref 5–8)
PLATELET # BLD AUTO: 164 K/UL — SIGNIFICANT CHANGE UP (ref 150–400)
PLATELET # BLD AUTO: 164 K/UL — SIGNIFICANT CHANGE UP (ref 150–400)
PLATELET # BLD AUTO: 196 K/UL — SIGNIFICANT CHANGE UP (ref 150–400)
PLATELET # BLD AUTO: 196 K/UL — SIGNIFICANT CHANGE UP (ref 150–400)
POTASSIUM SERPL-MCNC: 3.4 MMOL/L — LOW (ref 3.5–5.3)
POTASSIUM SERPL-MCNC: 3.4 MMOL/L — LOW (ref 3.5–5.3)
POTASSIUM SERPL-MCNC: 3.9 MMOL/L — SIGNIFICANT CHANGE UP (ref 3.5–5.3)
POTASSIUM SERPL-MCNC: 3.9 MMOL/L — SIGNIFICANT CHANGE UP (ref 3.5–5.3)
POTASSIUM SERPL-SCNC: 3.4 MMOL/L — LOW (ref 3.5–5.3)
POTASSIUM SERPL-SCNC: 3.4 MMOL/L — LOW (ref 3.5–5.3)
POTASSIUM SERPL-SCNC: 3.9 MMOL/L — SIGNIFICANT CHANGE UP (ref 3.5–5.3)
POTASSIUM SERPL-SCNC: 3.9 MMOL/L — SIGNIFICANT CHANGE UP (ref 3.5–5.3)
PROT UR-MCNC: NEGATIVE MG/DL — SIGNIFICANT CHANGE UP
PROT UR-MCNC: NEGATIVE MG/DL — SIGNIFICANT CHANGE UP
RBC # BLD: 3.01 M/UL — LOW (ref 3.8–5.2)
RBC # BLD: 3.01 M/UL — LOW (ref 3.8–5.2)
RBC # BLD: 3.35 M/UL — LOW (ref 3.8–5.2)
RBC # BLD: 3.35 M/UL — LOW (ref 3.8–5.2)
RBC # FLD: 14.1 % — SIGNIFICANT CHANGE UP (ref 10.3–14.5)
RBC CASTS # UR COMP ASSIST: 1 /HPF — SIGNIFICANT CHANGE UP (ref 0–4)
RBC CASTS # UR COMP ASSIST: 1 /HPF — SIGNIFICANT CHANGE UP (ref 0–4)
SODIUM SERPL-SCNC: 138 MMOL/L — SIGNIFICANT CHANGE UP (ref 135–145)
SODIUM SERPL-SCNC: 138 MMOL/L — SIGNIFICANT CHANGE UP (ref 135–145)
SODIUM SERPL-SCNC: 140 MMOL/L — SIGNIFICANT CHANGE UP (ref 135–145)
SODIUM SERPL-SCNC: 140 MMOL/L — SIGNIFICANT CHANGE UP (ref 135–145)
SP GR SPEC: 1.02 — SIGNIFICANT CHANGE UP (ref 1–1.03)
SP GR SPEC: 1.02 — SIGNIFICANT CHANGE UP (ref 1–1.03)
SQUAMOUS # UR AUTO: 1 /HPF — SIGNIFICANT CHANGE UP (ref 0–5)
SQUAMOUS # UR AUTO: 1 /HPF — SIGNIFICANT CHANGE UP (ref 0–5)
UROBILINOGEN FLD QL: 1 MG/DL — SIGNIFICANT CHANGE UP (ref 0.2–1)
UROBILINOGEN FLD QL: 1 MG/DL — SIGNIFICANT CHANGE UP (ref 0.2–1)
WBC # BLD: 7.86 K/UL — SIGNIFICANT CHANGE UP (ref 3.8–10.5)
WBC # BLD: 7.86 K/UL — SIGNIFICANT CHANGE UP (ref 3.8–10.5)
WBC # BLD: 9.35 K/UL — SIGNIFICANT CHANGE UP (ref 3.8–10.5)
WBC # BLD: 9.35 K/UL — SIGNIFICANT CHANGE UP (ref 3.8–10.5)
WBC # FLD AUTO: 7.86 K/UL — SIGNIFICANT CHANGE UP (ref 3.8–10.5)
WBC # FLD AUTO: 7.86 K/UL — SIGNIFICANT CHANGE UP (ref 3.8–10.5)
WBC # FLD AUTO: 9.35 K/UL — SIGNIFICANT CHANGE UP (ref 3.8–10.5)
WBC # FLD AUTO: 9.35 K/UL — SIGNIFICANT CHANGE UP (ref 3.8–10.5)
WBC UR QL: 0 /HPF — SIGNIFICANT CHANGE UP (ref 0–5)
WBC UR QL: 0 /HPF — SIGNIFICANT CHANGE UP (ref 0–5)

## 2023-12-29 PROCEDURE — 93306 TTE W/DOPPLER COMPLETE: CPT | Mod: 26

## 2023-12-29 PROCEDURE — 93970 EXTREMITY STUDY: CPT | Mod: 26

## 2023-12-29 RX ORDER — CHLORHEXIDINE GLUCONATE 213 G/1000ML
1 SOLUTION TOPICAL DAILY
Refills: 0 | Status: DISCONTINUED | OUTPATIENT
Start: 2023-12-29 | End: 2023-12-30

## 2023-12-29 RX ORDER — POTASSIUM CHLORIDE 20 MEQ
40 PACKET (EA) ORAL ONCE
Refills: 0 | Status: COMPLETED | OUTPATIENT
Start: 2023-12-29 | End: 2023-12-29

## 2023-12-29 RX ADMIN — DICLOFENAC SODIUM 75 MILLIGRAM(S): 75 TABLET, DELAYED RELEASE ORAL at 12:49

## 2023-12-29 RX ADMIN — ATORVASTATIN CALCIUM 20 MILLIGRAM(S): 80 TABLET, FILM COATED ORAL at 21:29

## 2023-12-29 RX ADMIN — Medication 150 MILLIGRAM(S): at 12:49

## 2023-12-29 RX ADMIN — Medication 10 MILLIGRAM(S): at 12:49

## 2023-12-29 RX ADMIN — DICLOFENAC SODIUM 75 MILLIGRAM(S): 75 TABLET, DELAYED RELEASE ORAL at 13:49

## 2023-12-29 RX ADMIN — Medication 81 MILLIGRAM(S): at 12:49

## 2023-12-29 RX ADMIN — Medication 40 MILLIEQUIVALENT(S): at 10:16

## 2023-12-29 RX ADMIN — ATORVASTATIN CALCIUM 20 MILLIGRAM(S): 80 TABLET, FILM COATED ORAL at 01:14

## 2023-12-29 RX ADMIN — ENOXAPARIN SODIUM 40 MILLIGRAM(S): 100 INJECTION SUBCUTANEOUS at 01:15

## 2023-12-29 RX ADMIN — CHLORHEXIDINE GLUCONATE 1 APPLICATION(S): 213 SOLUTION TOPICAL at 18:24

## 2023-12-29 NOTE — CONSULT NOTE ADULT - SUBJECTIVE AND OBJECTIVE BOX
Cardiovascular Disease Initial Evaluation  Date of service: 12-29-23 @ 16:14    CHIEF COMPLAINT:  Falls    HISTORY OF PRESENT ILLNESS: Ms. Tucker is an 81 y/o female PMHx HTN, diverticulitis c/b abscess requiring hemicolectomy further c/b empyema requiring thoracotomy  HLD, PSx hysterectomy, b/l shoulder replacement, b/l knee replacement now presenting to the ED via EMS s/p unwitnessed fall now c/o RUE pain. Patient reported she had mechanical fall onto right side with carpeted floor head strike and was assisted back into bed by EMS. Patient then had another fall but cannot recall how or when she fell. Patient was able to ambulate after the fall. Does not ambulate with assisted devices inside the house and lives by herself with family nearby. Denied CP, SOB, abdominal pain, N/V/D, headache, fever, cough, urinary symptoms, urinary fecal incontinence, back pain, neck pain, pelvic pain.     Of note patient does not have CAD history. She follows with a cardiologist in [place] Dr. Charles Traube who she states she saw just before [place] and was told everything was fine. Patient denies chest pain or SOB.       Allergies    sulfa drugs (Rash)    Intolerances    	    MEDICATIONS:  aspirin enteric coated 81 milliGRAM(s) Oral daily  enoxaparin Injectable 40 milliGRAM(s) SubCutaneous every 24 hours        diclofenac 75 milliGRAM(s) Oral daily  venlafaxine XR. 150 milliGRAM(s) Oral daily  zolpidem 5 milliGRAM(s) Oral at bedtime PRN    misoprostol 200 MICROGram(s) Oral every 6 hours    atorvastatin 20 milliGRAM(s) Oral at bedtime    chlorhexidine 2% Cloths 1 Application(s) Topical daily  oxybutynin XL 10 milliGRAM(s) Oral daily      PAST MEDICAL & SURGICAL HISTORY:  H/O: HTN (hypertension)      H/O: hysterectomy      History of knee replacement      History of total shoulder replacement          FAMILY HISTORY:      SOCIAL HISTORY:    The patient is a nonsmoker       REVIEW OF SYSTEMS:  See HPI, otherwise complete 14 point review of systems negative    [x ] All others negative	  [ ] Unable to obtain    PHYSICAL EXAM:  T(C): 37 (12-29-23 @ 10:53), Max: 37 (12-29-23 @ 10:53)  HR: 84 (12-29-23 @ 10:53) (84 - 107)  BP: 113/70 (12-29-23 @ 10:53) (97/54 - 143/96)  RR: 18 (12-29-23 @ 10:53) (18 - 18)  SpO2: 96% (12-29-23 @ 10:53) (94% - 96%)  Wt(kg): --  I&O's Summary      Appearance: No Acute Distress; resting comfortably  HEENT:  Normal oral mucosa, PERRL, EOMI	  Cardiovascular: Normal S1 S2, No JVD, No murmurs/rubs/gallops  Respiratory: Normal respiratory effort; Lungs clear to auscultation bilaterally  Gastrointestinal:  Soft, Non-tender, + BS	  Skin: No rashes, No ecchymoses, No cyanosis	  Neurologic: Non-focal; no weakness  Extremities: No clubbing, cyanosis or edema  Vascular: Peripheral pulses palpable 2+ bilaterally  Psychiatry: A & O x 3, Mood & affect appropriate    Laboratory Data:	 	    CBC Full  -  ( 29 Dec 2023 13:19 )  WBC Count : 9.35 K/uL  Hemoglobin : 9.6 g/dL  Hematocrit : 29.1 %  Platelet Count - Automated : 196 K/uL  Mean Cell Volume : 86.9 fl  Mean Cell Hemoglobin : 28.7 pg  Mean Cell Hemoglobin Concentration : 33.0 gm/dL  Auto Neutrophil # : x  Auto Lymphocyte # : x  Auto Monocyte # : x  Auto Eosinophil # : x  Auto Basophil # : x  Auto Neutrophil % : x  Auto Lymphocyte % : x  Auto Monocyte % : x  Auto Eosinophil % : x  Auto Basophil % : x    12-29    138  |  101  |  25<H>  ----------------------------<  106<H>  3.9   |  26  |  0.66  12-29    140  |  103  |  28<H>  ----------------------------<  138<H>  3.4<L>   |  26  |  0.83    Ca    8.8      29 Dec 2023 13:19  Ca    8.4      29 Dec 2023 06:34  Phos  3.9     12-28  Mg     2.0     12-28    TPro  5.5<L>  /  Alb  3.5  /  TBili  0.5  /  DBili  x   /  AST  18  /  ALT  16  /  AlkPhos  89  12-28      proBNP:   Lipid Profile:   HgA1c:   TSH: Thyroid Stimulating Hormone, Serum: 3.25 uIU/mL (12-28 @ 16:19)        CARDIAC MARKERS:            Interpretation of Telemetry: Sinus 	    ECG: Sinus rhythm  RADIOLOGY:  OTHER: 	    PREVIOUS DIAGNOSTIC TESTING:    [x ] Echocardiogram: 12/29/2023   1. Left ventricular cavity is normal. Left ventricular wall thickness is normal. Left ventricular systolic function is normal with an ejection fraction of 74 % by Rosenbaum's method of disks. There are no regional wall motion abnormalities seen.   2. There is moderate (grade 2) left ventricular diastolic dysfunction, with normal filling pressure.   3. Normal right ventricular cavity size, wall thickness, and systolic function.   4. The left atrium is severely dilated.   5. Moderate mitral regurgitation.   6. Mild to moderate mitral valve stenosis.   7. No pericardial effusion seen.   8. Estimated pulmonary artery systolic pressure is 40 mmHg.   9. No prior echocardiogram is available for comparison.    [ ] Catheterization:  [ ] Stress Test:  	    Assessment:   81 y/o female PMHx HTN, diverticulitis c/b abscess requiring hemicolectomy further c/b empyema requiring thoracotomy  HLD, PSx hysterectomy, b/l shoulder replacement, b/l knee replacement now presenting to the ED via EMS s/p unwitnessed fall     Plan of Care:    #Fall   - Unwitnessed  - Rule out cardiogenic etiology  - EKG on admission shows sinus rhythm   - Orthostatics are negative  - Echo 12/2023 shows a hyperdynamic LV with mild to moderate MS  - Hyperdynamic LV is usually seen with dehydration, may consider gentle fluid hydration   - Patients states she has had mitral stenosis for years and it is managed by her cardiologist Dr. Traube  - No indication for valvular intervention  - Patient may follow up as an outpatient with Dr. Traube upon discharge  - No further cardiac work up planned at this time.     #HLD  - Statin therapy      #Mitral stenosis  - Refer to plan above.         72 minutes spent on total encounter; more than 50% of the visit was spent counseling and/or coordinating care by the attending physician.   	  Jan Garrison DO [facility]  Cardiovascular Diseases  [phone]     Cardiovascular Disease Initial Evaluation  Date of service: 12-29-23 @ 16:14    CHIEF COMPLAINT:  Falls    HISTORY OF PRESENT ILLNESS: Ms. Tucker is an 83 y/o female PMHx HTN, diverticulitis c/b abscess requiring hemicolectomy further c/b empyema requiring thoracotomy  HLD, PSx hysterectomy, b/l shoulder replacement, b/l knee replacement now presenting to the ED via EMS s/p unwitnessed fall now c/o RUE pain. Patient reported she had mechanical fall onto right side with carpeted floor head strike and was assisted back into bed by EMS. Patient then had another fall but cannot recall how or when she fell. Patient was able to ambulate after the fall. Does not ambulate with assisted devices inside the house and lives by herself with family nearby. Denied CP, SOB, abdominal pain, N/V/D, headache, fever, cough, urinary symptoms, urinary fecal incontinence, back pain, neck pain, pelvic pain.     Of note patient does not have CAD history. She follows with a cardiologist in [place] Dr. Charles Traube who she states she saw just before [place] and was told everything was fine. Patient denies chest pain or SOB.       Allergies    sulfa drugs (Rash)    Intolerances    	    MEDICATIONS:  aspirin enteric coated 81 milliGRAM(s) Oral daily  enoxaparin Injectable 40 milliGRAM(s) SubCutaneous every 24 hours        diclofenac 75 milliGRAM(s) Oral daily  venlafaxine XR. 150 milliGRAM(s) Oral daily  zolpidem 5 milliGRAM(s) Oral at bedtime PRN    misoprostol 200 MICROGram(s) Oral every 6 hours    atorvastatin 20 milliGRAM(s) Oral at bedtime    chlorhexidine 2% Cloths 1 Application(s) Topical daily  oxybutynin XL 10 milliGRAM(s) Oral daily      PAST MEDICAL & SURGICAL HISTORY:  H/O: HTN (hypertension)      H/O: hysterectomy      History of knee replacement      History of total shoulder replacement          FAMILY HISTORY:      SOCIAL HISTORY:    The patient is a nonsmoker       REVIEW OF SYSTEMS:  See HPI, otherwise complete 14 point review of systems negative    [x ] All others negative	  [ ] Unable to obtain    PHYSICAL EXAM:  T(C): 37 (12-29-23 @ 10:53), Max: 37 (12-29-23 @ 10:53)  HR: 84 (12-29-23 @ 10:53) (84 - 107)  BP: 113/70 (12-29-23 @ 10:53) (97/54 - 143/96)  RR: 18 (12-29-23 @ 10:53) (18 - 18)  SpO2: 96% (12-29-23 @ 10:53) (94% - 96%)  Wt(kg): --  I&O's Summary      Appearance: No Acute Distress; resting comfortably  HEENT:  Normal oral mucosa, PERRL, EOMI	  Cardiovascular: Normal S1 S2, No JVD, No murmurs/rubs/gallops  Respiratory: Normal respiratory effort; Lungs clear to auscultation bilaterally  Gastrointestinal:  Soft, Non-tender, + BS	  Skin: No rashes, No ecchymoses, No cyanosis	  Neurologic: Non-focal; no weakness  Extremities: No clubbing, cyanosis or edema  Vascular: Peripheral pulses palpable 2+ bilaterally  Psychiatry: A & O x 3, Mood & affect appropriate    Laboratory Data:	 	    CBC Full  -  ( 29 Dec 2023 13:19 )  WBC Count : 9.35 K/uL  Hemoglobin : 9.6 g/dL  Hematocrit : 29.1 %  Platelet Count - Automated : 196 K/uL  Mean Cell Volume : 86.9 fl  Mean Cell Hemoglobin : 28.7 pg  Mean Cell Hemoglobin Concentration : 33.0 gm/dL  Auto Neutrophil # : x  Auto Lymphocyte # : x  Auto Monocyte # : x  Auto Eosinophil # : x  Auto Basophil # : x  Auto Neutrophil % : x  Auto Lymphocyte % : x  Auto Monocyte % : x  Auto Eosinophil % : x  Auto Basophil % : x    12-29    138  |  101  |  25<H>  ----------------------------<  106<H>  3.9   |  26  |  0.66  12-29    140  |  103  |  28<H>  ----------------------------<  138<H>  3.4<L>   |  26  |  0.83    Ca    8.8      29 Dec 2023 13:19  Ca    8.4      29 Dec 2023 06:34  Phos  3.9     12-28  Mg     2.0     12-28    TPro  5.5<L>  /  Alb  3.5  /  TBili  0.5  /  DBili  x   /  AST  18  /  ALT  16  /  AlkPhos  89  12-28      proBNP:   Lipid Profile:   HgA1c:   TSH: Thyroid Stimulating Hormone, Serum: 3.25 uIU/mL (12-28 @ 16:19)        CARDIAC MARKERS:            Interpretation of Telemetry: Sinus 	    ECG: Sinus rhythm  RADIOLOGY:  OTHER: 	    PREVIOUS DIAGNOSTIC TESTING:    [x ] Echocardiogram: 12/29/2023   1. Left ventricular cavity is normal. Left ventricular wall thickness is normal. Left ventricular systolic function is normal with an ejection fraction of 74 % by Rosenbaum's method of disks. There are no regional wall motion abnormalities seen.   2. There is moderate (grade 2) left ventricular diastolic dysfunction, with normal filling pressure.   3. Normal right ventricular cavity size, wall thickness, and systolic function.   4. The left atrium is severely dilated.   5. Moderate mitral regurgitation.   6. Mild to moderate mitral valve stenosis.   7. No pericardial effusion seen.   8. Estimated pulmonary artery systolic pressure is 40 mmHg.   9. No prior echocardiogram is available for comparison.    [ ] Catheterization:  [ ] Stress Test:  	    Assessment:   83 y/o female PMHx HTN, diverticulitis c/b abscess requiring hemicolectomy further c/b empyema requiring thoracotomy  HLD, PSx hysterectomy, b/l shoulder replacement, b/l knee replacement now presenting to the ED via EMS s/p unwitnessed fall     Plan of Care:    #Fall   - Unwitnessed  - Rule out cardiogenic etiology  - EKG on admission shows sinus rhythm   - Orthostatics are negative  - Echo 12/2023 shows a hyperdynamic LV with mild to moderate MS  - Hyperdynamic LV is usually seen with dehydration, may consider gentle fluid hydration   - Patients states she has had mitral stenosis for years and it is managed by her cardiologist Dr. Traube  - No indication for valvular intervention  - Patient may follow up as an outpatient with Dr. Traube upon discharge  - No further cardiac work up planned at this time.     #HLD  - Statin therapy      #Mitral stenosis  - Refer to plan above.         72 minutes spent on total encounter; more than 50% of the visit was spent counseling and/or coordinating care by the attending physician.   	  Jan Garrison DO [facility]  Cardiovascular Diseases  [phone]

## 2023-12-29 NOTE — PHYSICAL THERAPY INITIAL EVALUATION ADULT - GAIT DEVIATIONS NOTED, PT EVAL
decreased felisa/increased time in double stance/decreased velocity of limb motion/decreased step length/decreased weight-shifting ability

## 2023-12-29 NOTE — PHYSICAL THERAPY INITIAL EVALUATION ADULT - ADDITIONAL COMMENTS
Pt resides in co-op, alone, 2 steps to enter, PTA independent with mobility and ADL's, ambulatory with cane at times, also uses shopping cart, owns rollator, (+)driving.

## 2023-12-29 NOTE — PHYSICAL THERAPY INITIAL EVALUATION ADULT - GENERAL OBSERVATIONS, REHAB EVAL
received OOB sitting in chair, pleasant & eager to participate, A&OX4, following commands, admitted s/p fall

## 2023-12-29 NOTE — PROGRESS NOTE ADULT - ASSESSMENT
81 y/o female PMHx HTN, divertic c/b abscess requiring hemicolectomy further c/b empyema requiring thoracotomy  HLD, PSx hysterectomy, b/l shoulder replacement, b/l knee replacement now presenting to the ED via EMS s/p unwitnessed fall today now c/o RUE pain. Patient reported she had mechanical fall onto right side this morning with carpeted floor  this morning with headstrike and was assisted back into bed by EMS. Patient then had another fall but cannot recall how or when she fell. Patient was able to ambulate after the fall. Does not ambulate with assisted devices inside the house and lives by herself with family nearby. Denied CP, SOB, abdominal pain, N/V/D, headache, fever, cough, urinary symptoms, urinary fecal incontinence, back pain, neck pain, pelvic pain       Problem/Plan - 1:  ·  Problem: Multiple falls.   ·  Plan: < from: CT 3D Reconstruct w/ Workstation (12.28.23 @ 17:29) >      IMPRESSION:    CT HEAD: No acute intracranial hemorrhage    CT MAXILLOFACIAL: No acute facial or orbital fracture.    < end of copied text >.     Problem/Plan - 2:  ·  Problem: Syncope.   ·  Plan: CT head noted.   Admitted to Tele to R/O Arrythmia .  Cards and Neurology help appreciated.   < from: TTE W or WO Ultrasound Enhancing Agent (12.29.23 @ 11:11) >  CONCLUSIONS:      1. Left ventricular cavity is normal. Left ventricular wall thickness is normal. Left ventricular systolic function is normal with an ejection fraction of 74 % by Rosenbaum's method of disks. There are no regional wall motion abnormalities seen.   2. There is moderate (grade 2) left ventricular diastolic dysfunction, with normal filling pressure.   3. Normal right ventricular cavity size, wall thickness, and systolic function.   4. The left atrium is severely dilated.   5. Moderate mitral regurgitation.   6. Mild to moderate mitral valve stenosis.   7. No pericardial effusion seen.   8. Estimated pulmonary artery systolic pressure is 40 mmHg.   9. No prior echocardiogram is available for comparison.    < end of copied text >     Problem/Plan - 3:  ·  Problem: HTN (hypertension).   ·  Plan: BP meds with hold parameters.     83 y/o female PMHx HTN, divertic c/b abscess requiring hemicolectomy further c/b empyema requiring thoracotomy  HLD, PSx hysterectomy, b/l shoulder replacement, b/l knee replacement now presenting to the ED via EMS s/p unwitnessed fall today now c/o RUE pain. Patient reported she had mechanical fall onto right side this morning with carpeted floor  this morning with headstrike and was assisted back into bed by EMS. Patient then had another fall but cannot recall how or when she fell. Patient was able to ambulate after the fall. Does not ambulate with assisted devices inside the house and lives by herself with family nearby. Denied CP, SOB, abdominal pain, N/V/D, headache, fever, cough, urinary symptoms, urinary fecal incontinence, back pain, neck pain, pelvic pain       Problem/Plan - 1:  ·  Problem: Multiple falls.   ·  Plan: < from: CT 3D Reconstruct w/ Workstation (12.28.23 @ 17:29) >      IMPRESSION:    CT HEAD: No acute intracranial hemorrhage    CT MAXILLOFACIAL: No acute facial or orbital fracture.    < end of copied text >.     Problem/Plan - 2:  ·  Problem: Syncope.   ·  Plan: CT head noted.   Admitted to Tele to R/O Arrythmia .  Cards and Neurology help appreciated.   < from: TTE W or WO Ultrasound Enhancing Agent (12.29.23 @ 11:11) >  CONCLUSIONS:      1. Left ventricular cavity is normal. Left ventricular wall thickness is normal. Left ventricular systolic function is normal with an ejection fraction of 74 % by Rosenbaum's method of disks. There are no regional wall motion abnormalities seen.   2. There is moderate (grade 2) left ventricular diastolic dysfunction, with normal filling pressure.   3. Normal right ventricular cavity size, wall thickness, and systolic function.   4. The left atrium is severely dilated.   5. Moderate mitral regurgitation.   6. Mild to moderate mitral valve stenosis.   7. No pericardial effusion seen.   8. Estimated pulmonary artery systolic pressure is 40 mmHg.   9. No prior echocardiogram is available for comparison.    < end of copied text >     Problem/Plan - 3:  ·  Problem: HTN (hypertension).   ·  Plan: BP meds with hold parameters.

## 2023-12-29 NOTE — PHYSICAL THERAPY INITIAL EVALUATION ADULT - PERTINENT HX OF CURRENT PROBLEM, REHAB EVAL
Pt is 82F admitted 12/29/23 PMHx HTN, divertic c/b abscess requiring hemicolectomy further c/b empyema requiring thoracotomy  HLD, PSx hysterectomy, b/l shoulder replacement, b/l knee replacement now presenting to the ED via EMS s/p unwitnessed fall today now c/o RUE pain. Patient reported she had mechanical fall onto right side this morning with carpeted floor  this morning with headstrike and was assisted back into bed by EMS. Patient then had another fall but cannot recall how or when she fell.      CT HEAD: No acute intracranial hemorrhage. CT MAXILLOFACIAL: No acute facial or orbital fracture. XRAY HAND: No acute osseous abnormalities. XRAY FEMUR: No acute fracture or dislocation.

## 2023-12-29 NOTE — PROGRESS NOTE ADULT - SUBJECTIVE AND OBJECTIVE BOX
Date of Service  : 12-29-23     INTERVAL HPI/OVERNIGHT EVENTS: I feel fine and want to go home.   Vital Signs Last 24 Hrs  T(C): 37 (29 Dec 2023 10:53), Max: 37 (29 Dec 2023 10:53)  T(F): 98.6 (29 Dec 2023 10:53), Max: 98.6 (29 Dec 2023 10:53)  HR: 84 (29 Dec 2023 10:53) (84 - 107)  BP: 113/70 (29 Dec 2023 10:53) (97/54 - 143/96)  BP(mean): 111 (28 Dec 2023 20:07) (81 - 111)  RR: 18 (29 Dec 2023 10:53) (18 - 18)  SpO2: 96% (29 Dec 2023 10:53) (94% - 96%)    Parameters below as of 29 Dec 2023 10:53  Patient On (Oxygen Delivery Method): room air      I&O's Summary    MEDICATIONS  (STANDING):  aspirin enteric coated 81 milliGRAM(s) Oral daily  atorvastatin 20 milliGRAM(s) Oral at bedtime  diclofenac 75 milliGRAM(s) Oral daily  enoxaparin Injectable 40 milliGRAM(s) SubCutaneous every 24 hours  misoprostol 200 MICROGram(s) Oral every 6 hours  oxybutynin XL 10 milliGRAM(s) Oral daily  venlafaxine XR. 150 milliGRAM(s) Oral daily    MEDICATIONS  (PRN):  zolpidem 5 milliGRAM(s) Oral at bedtime PRN Insomnia    LABS:                        9.6    9.35  )-----------( 196      ( 29 Dec 2023 13:19 )             29.1     12-29    138  |  101  |  25<H>  ----------------------------<  106<H>  3.9   |  26  |  0.66    Ca    8.8      29 Dec 2023 13:19  Phos  3.9     12-28  Mg     2.0     12-28    TPro  5.5<L>  /  Alb  3.5  /  TBili  0.5  /  DBili  x   /  AST  18  /  ALT  16  /  AlkPhos  89  12-28    PT/INR - ( 28 Dec 2023 16:19 )   PT: 11.1 sec;   INR: 1.06 ratio         PTT - ( 28 Dec 2023 16:19 )  PTT:32.8 sec  Urinalysis Basic - ( 29 Dec 2023 13:19 )    Color: x / Appearance: x / SG: x / pH: x  Gluc: 106 mg/dL / Ketone: x  / Bili: x / Urobili: x   Blood: x / Protein: x / Nitrite: x   Leuk Esterase: x / RBC: x / WBC x   Sq Epi: x / Non Sq Epi: x / Bacteria: x      CAPILLARY BLOOD GLUCOSE            Urinalysis Basic - ( 29 Dec 2023 13:19 )    Color: x / Appearance: x / SG: x / pH: x  Gluc: 106 mg/dL / Ketone: x  / Bili: x / Urobili: x   Blood: x / Protein: x / Nitrite: x   Leuk Esterase: x / RBC: x / WBC x   Sq Epi: x / Non Sq Epi: x / Bacteria: x      REVIEW OF SYSTEMS:  CONSTITUTIONAL: No fever, weight loss, or fatigue  EYES: No eye pain, visual disturbances, or discharge  ENMT:  No difficulty hearing, tinnitus, vertigo; No sinus or throat pain  NECK: No pain or stiffness  RESPIRATORY: No cough, wheezing, chills or hemoptysis; No shortness of breath  CARDIOVASCULAR: No chest pain, palpitations, dizziness, or leg swelling  GASTROINTESTINAL: No abdominal or epigastric pain. No nausea, vomiting, or hematemesis; No diarrhea or constipation. No melena or hematochezia.  GENITOURINARY: No dysuria, frequency, hematuria, or incontinence  NEUROLOGICAL: No headaches, memory loss, loss of strength, numbness, or tremors      RADIOLOGY & ADDITIONAL TESTS:    Consultant(s) Notes Reviewed:  [x ] YES  [ ] NO    PHYSICAL EXAM:  GENERAL: NAD, well-groomed, well-developed,not in any distress ,  HEAD:  Atraumatic, Normocephalic  EYES: EOMI, PERRLA, conjunctiva and sclera clear  ENMT: No tonsillar erythema, exudates, or enlargement; Moist mucous membranes, Good dentition, No lesions  NECK: Supple, No JVD, Normal thyroid  NERVOUS SYSTEM:  Alert & Oriented X3, No focal deficit   CHEST/LUNG: Good air entry bilateral with no  rales, rhonchi, wheezing, or rubs  HEART: Regular rate and rhythm; No murmurs, rubs, or gallops  ABDOMEN: Soft, Nontender, Nondistended; Bowel sounds present  EXTREMITIES:  2+ Peripheral Pulses, No clubbing, cyanosis, or edema      Care Discussed with Consultants/Other Providers [ x] YES  [ ] NO

## 2023-12-29 NOTE — PHYSICAL THERAPY INITIAL EVALUATION ADULT - PLANNED THERAPY INTERVENTIONS, PT EVAL
Detail Level: Detailed
X Size Of Lesion In Cm (Optional): 0
GOALS: Pt will negotiate 12 steps with handrail & step to pattern with independence in 4wks/bed mobility training/gait training/transfer training

## 2023-12-29 NOTE — CONSULT NOTE ADULT - SUBJECTIVE AND OBJECTIVE BOX
Neurology Consult    Reason for Consult: Patient is a 83y old  Female who presents with a chief complaint of Multiple Falls (28 Dec 2023 20:07)      HPI:  83 y/o female PMHx HTN, divertic c/b abscess requiring hemicolectomy further c/b empyema requiring thoracotomy  HLD, PSx hysterectomy, b/l shoulder replacement, b/l knee replacement now presenting to the ED via EMS s/p unwitnessed fall today now c/o RUE pain. Patient reported she had mechanical fall onto right side this morning with carpeted floor  this morning with headstrike and was assisted back into bed by EMS. Patient then had another fall but cannot recall how or when she fell. Patient was able to ambulate after the fall. Does not ambulate with assisted devices inside the house and lives by herself with family nearby. Denied CP, SOB, abdominal pain, N/V/D, headache, fever, cough, urinary symptoms, urinary fecal incontinence, back pain, neck pain, pelvic pain (28 Dec 2023 20:07)       PAST MEDICAL & SURGICAL HISTORY:  H/O: HTN (hypertension)      H/O: hysterectomy      History of knee replacement      History of total shoulder replacement          Allergies: Allergies    sulfa drugs (Rash)    Intolerances        Social History: Denies toxic habits including tobacco, ETOH or illicit drugs.    Family History: FAMILY HISTORY:  . No family history of strokes    Medications: MEDICATIONS  (STANDING):  aspirin enteric coated 81 milliGRAM(s) Oral daily  atorvastatin 20 milliGRAM(s) Oral at bedtime  diclofenac 75 milliGRAM(s) Oral daily  enoxaparin Injectable 40 milliGRAM(s) SubCutaneous every 24 hours  misoprostol 200 MICROGram(s) Oral every 6 hours  oxybutynin XL 10 milliGRAM(s) Oral daily  potassium chloride   Powder 40 milliEquivalent(s) Oral once  venlafaxine XR. 150 milliGRAM(s) Oral daily    MEDICATIONS  (PRN):  zolpidem 5 milliGRAM(s) Oral at bedtime PRN Insomnia      Review of Systems:  CONSTITUTIONAL:  No weight loss, fever, chills, weakness or fatigue.  HEENT:  Eyes:  No visual loss, blurred vision, double vision or yellow sclera. Ears, Nose, Throat:  No hearing loss, sneezing, congestion, runny nose or sore throat.  SKIN:  No rash or itching.  CARDIOVASCULAR:  No chest pain, chest pressure or chest discomfort. No palpitations or edema.  RESPIRATORY:  No shortness of breath, cough or sputum.  GASTROINTESTINAL:  No anorexia, nausea, vomiting or diarrhea. No abdominal pain or blood.  GENITOURINARY:  No burning on urination or incontinence   NEUROLOGICAL:  No headache, dizziness, syncope, paralysis, ataxia, numbness or tingling in the extremities. No change in bowel or bladder control. no limb weakness. no vision changes.   MUSCULOSKELETAL:  shoulder pain on R .  HEMATOLOGIC:  No anemia, bleeding or bruising.  LYMPHATICS:  No enlarged nodes. No history of splenectomy.  PSYCHIATRIC:  No history of depression or anxiety.  ENDOCRINOLOGIC:  No reports of sweating, cold or heat intolerance. No polyuria or polydipsia.      Vitals:  Vital Signs Last 24 Hrs  T(C): 36.4 (29 Dec 2023 08:00), Max: 36.8 (29 Dec 2023 04:23)  T(F): 97.5 (29 Dec 2023 08:00), Max: 98.2 (29 Dec 2023 04:23)  HR: 100 (29 Dec 2023 08:00) (87 - 107)  BP: 97/54 (29 Dec 2023 08:00) (97/54 - 143/96)  BP(mean): 111 (28 Dec 2023 20:07) (81 - 111)  RR: 18 (29 Dec 2023 08:00) (18 - 19)  SpO2: 95% (29 Dec 2023 08:00) (94% - 98%)    Parameters below as of 29 Dec 2023 08:00  Patient On (Oxygen Delivery Method): room air    Orthostatic VS      General Exam:   General Appearance: Appropriately dressed and in no acute distress       Head: Normocephalic, atraumatic and no dysmorphic features  Ear, Nose, and Throat: Moist mucous membranes  CVS: S1S2+  Resp: No SOB, no wheeze or rhonchi  GI: soft NT/ND  Extremities: No edema or cyanosis  Skin: No bruises or rashes     Neurological Exam:  Mental Status: Awake, alert and oriented x 3.  Able to follow simple and complex verbal commands. Able to name and repeat. fluent speech. No obvious aphasia or dysarthria noted.   Cranial Nerves: PERRL, EOMI, VFFC, sensation V1-V3 intact,  no obvious facial asymmetry, equal elevation of palate, scm/trap 5/5, tongue is midline on protrusion. no obvious papilledema on fundoscopic exam. hearing is grossly intact.   Motor: Normal bulk, tone and strength throughout except pain limited RUE weakness   Sensation: Intact to light touch and pinprick throughout. no right/left confusion. no extinction to tactile on DSS. Romberg was negative.   Reflexes: 1+ throughout at biceps, brachioradialis, triceps, patellars and ankles bilaterally and equal. No clonus. R toe and L toe were both downgoing.  Coordination: No dysmetria on FNF or HKS  Gait: Narrow based and steady. Able to tandem. no limitations in gait.     Data/Labs/Imaging which I personally reviewed.     Labs:     CBC Full  -  ( 29 Dec 2023 06:35 )  WBC Count : 7.86 K/uL  RBC Count : 3.01 M/uL  Hemoglobin : 8.8 g/dL  Hematocrit : 26.4 %  Platelet Count - Automated : 164 K/uL  Mean Cell Volume : 87.7 fl  Mean Cell Hemoglobin : 29.2 pg  Mean Cell Hemoglobin Concentration : 33.3 gm/dL  Auto Neutrophil # : x  Auto Lymphocyte # : x  Auto Monocyte # : x  Auto Eosinophil # : x  Auto Basophil # : x  Auto Neutrophil % : x  Auto Lymphocyte % : x  Auto Monocyte % : x  Auto Eosinophil % : x  Auto Basophil % : x    12-    140  |  103  |  28<H>  ----------------------------<  138<H>  3.4<L>   |  26  |  0.83    Ca    8.4      29 Dec 2023 06:34  Phos  3.9     -  Mg     2.0     12-    TPro  5.5<L>  /  Alb  3.5  /  TBili  0.5  /  DBili  x   /  AST  18  /  ALT  16  /  AlkPhos  89  12-    LIVER FUNCTIONS - ( 28 Dec 2023 16:19 )  Alb: 3.5 g/dL / Pro: 5.5 g/dL / ALK PHOS: 89 U/L / ALT: 16 U/L / AST: 18 U/L / GGT: x           PT/INR - ( 28 Dec 2023 16:19 )   PT: 11.1 sec;   INR: 1.06 ratio         PTT - ( 28 Dec 2023 16:19 )  PTT:32.8 sec  Urinalysis Basic - ( 29 Dec 2023 09:19 )    Color: Yellow / Appearance: Clear / S.020 / pH: x  Gluc: x / Ketone: Negative mg/dL  / Bili: Negative / Urobili: 1.0 mg/dL   Blood: x / Protein: Negative mg/dL / Nitrite: Negative   Leuk Esterase: Negative / RBC: 1 /HPF / WBC 0 /HPF   Sq Epi: x / Non Sq Epi: 1 /HPF / Bacteria: Negative /HPF

## 2023-12-29 NOTE — CONSULT NOTE ADULT - ASSESSMENT
83 y/o female with HTN, divertic c/b abscess requiring hemicolectomy further c/b empyema requiring thoracotomy  HLD,  b/l shoulder replacement, b/l knee replacement  presenting   s/p unwitnessed fall x2  now c/o RUE pain.  1st fall states she woke up 6AM to use bathroom, tripped over her feat, mechanical fall, no LOC, + incontinenece bc she was going to bathroom, no convuylsions or post ictal confusion   2nd syncopal event/fall with + LOC, no tongue bite no incontinece but couldn't get up   12/28 CTH neg and CT maxillo facial neg   12/28 CD neg     Imprssion:   SYncope and fall , doubt seizure     - check orthostatics   - limit sedating meds. takes ambien at night PRN   - TTE pending today  - cardio eval  - may benefit form extended cardiac monitoring   - douesn't sound like seizure, can pursue EEG outpatient in office    - telemetry  - PT/OT   - check FS, glucose control <180  - GI/DVT ppx  - Counseling on diet, exercise, and medication adherence was done  - Counseling on smoking cessation and alcohol consumption offered when appropriate.  - Pain assessed and judicious use of narcotics when appropriate was discussed.    - Stroke education given when appropriate.  - Importance of fall prevention discussed.   - Differential diagnosis and plan of care discussed with patient and/or family and primary team  - Thank you for allowing me to participate in the care of this patient. Call with questions.   - outpatient f/u with Dr. Michael on discharge     Ruben Michael MD  Vascular Neurology  Office: [phone]  81 y/o female with HTN, divertic c/b abscess requiring hemicolectomy further c/b empyema requiring thoracotomy  HLD,  b/l shoulder replacement, b/l knee replacement  presenting   s/p unwitnessed fall x2  now c/o RUE pain.  1st fall states she woke up 6AM to use bathroom, tripped over her feat, mechanical fall, no LOC, + incontinenece bc she was going to bathroom, no convuylsions or post ictal confusion   2nd syncopal event/fall with + LOC, no tongue bite no incontinece but couldn't get up   12/28 CTH neg and CT maxillo facial neg   12/28 CD neg     Imprssion:   SYncope and fall , doubt seizure     - check orthostatics   - limit sedating meds. takes ambien at night PRN   - TTE pending today  - cardio eval  - may benefit form extended cardiac monitoring   - douesn't sound like seizure, can pursue EEG outpatient in office    - telemetry  - PT/OT   - check FS, glucose control <180  - GI/DVT ppx  - Counseling on diet, exercise, and medication adherence was done  - Counseling on smoking cessation and alcohol consumption offered when appropriate.  - Pain assessed and judicious use of narcotics when appropriate was discussed.    - Stroke education given when appropriate.  - Importance of fall prevention discussed.   - Differential diagnosis and plan of care discussed with patient and/or family and primary team  - Thank you for allowing me to participate in the care of this patient. Call with questions.   - outpatient f/u with Dr. Michael on discharge     Ruben Michael MD  Vascular Neurology  Office: [phone]

## 2023-12-30 ENCOUNTER — TRANSCRIPTION ENCOUNTER (OUTPATIENT)
Age: 83
End: 2023-12-30

## 2023-12-30 VITALS
DIASTOLIC BLOOD PRESSURE: 75 MMHG | TEMPERATURE: 99 F | HEART RATE: 86 BPM | RESPIRATION RATE: 18 BRPM | OXYGEN SATURATION: 98 % | SYSTOLIC BLOOD PRESSURE: 122 MMHG

## 2023-12-30 LAB
CULTURE RESULTS: SIGNIFICANT CHANGE UP
CULTURE RESULTS: SIGNIFICANT CHANGE UP
HCT VFR BLD CALC: 25.4 % — LOW (ref 34.5–45)
HCT VFR BLD CALC: 25.4 % — LOW (ref 34.5–45)
HCT VFR BLD CALC: 25.9 % — LOW (ref 34.5–45)
HCT VFR BLD CALC: 25.9 % — LOW (ref 34.5–45)
HGB BLD-MCNC: 8.2 G/DL — LOW (ref 11.5–15.5)
HGB BLD-MCNC: 8.2 G/DL — LOW (ref 11.5–15.5)
HGB BLD-MCNC: 8.4 G/DL — LOW (ref 11.5–15.5)
HGB BLD-MCNC: 8.4 G/DL — LOW (ref 11.5–15.5)
MCHC RBC-ENTMCNC: 28.3 PG — SIGNIFICANT CHANGE UP (ref 27–34)
MCHC RBC-ENTMCNC: 28.3 PG — SIGNIFICANT CHANGE UP (ref 27–34)
MCHC RBC-ENTMCNC: 28.5 PG — SIGNIFICANT CHANGE UP (ref 27–34)
MCHC RBC-ENTMCNC: 28.5 PG — SIGNIFICANT CHANGE UP (ref 27–34)
MCHC RBC-ENTMCNC: 32.3 GM/DL — SIGNIFICANT CHANGE UP (ref 32–36)
MCHC RBC-ENTMCNC: 32.3 GM/DL — SIGNIFICANT CHANGE UP (ref 32–36)
MCHC RBC-ENTMCNC: 32.4 GM/DL — SIGNIFICANT CHANGE UP (ref 32–36)
MCHC RBC-ENTMCNC: 32.4 GM/DL — SIGNIFICANT CHANGE UP (ref 32–36)
MCV RBC AUTO: 87.6 FL — SIGNIFICANT CHANGE UP (ref 80–100)
MCV RBC AUTO: 87.6 FL — SIGNIFICANT CHANGE UP (ref 80–100)
MCV RBC AUTO: 87.8 FL — SIGNIFICANT CHANGE UP (ref 80–100)
MCV RBC AUTO: 87.8 FL — SIGNIFICANT CHANGE UP (ref 80–100)
MRSA PCR RESULT.: SIGNIFICANT CHANGE UP
MRSA PCR RESULT.: SIGNIFICANT CHANGE UP
NRBC # BLD: 0 /100 WBCS — SIGNIFICANT CHANGE UP (ref 0–0)
PLATELET # BLD AUTO: 153 K/UL — SIGNIFICANT CHANGE UP (ref 150–400)
PLATELET # BLD AUTO: 153 K/UL — SIGNIFICANT CHANGE UP (ref 150–400)
PLATELET # BLD AUTO: 166 K/UL — SIGNIFICANT CHANGE UP (ref 150–400)
PLATELET # BLD AUTO: 166 K/UL — SIGNIFICANT CHANGE UP (ref 150–400)
RBC # BLD: 2.9 M/UL — LOW (ref 3.8–5.2)
RBC # BLD: 2.9 M/UL — LOW (ref 3.8–5.2)
RBC # BLD: 2.95 M/UL — LOW (ref 3.8–5.2)
RBC # BLD: 2.95 M/UL — LOW (ref 3.8–5.2)
RBC # FLD: 14 % — SIGNIFICANT CHANGE UP (ref 10.3–14.5)
RBC # FLD: 14 % — SIGNIFICANT CHANGE UP (ref 10.3–14.5)
RBC # FLD: 14.2 % — SIGNIFICANT CHANGE UP (ref 10.3–14.5)
RBC # FLD: 14.2 % — SIGNIFICANT CHANGE UP (ref 10.3–14.5)
S AUREUS DNA NOSE QL NAA+PROBE: SIGNIFICANT CHANGE UP
S AUREUS DNA NOSE QL NAA+PROBE: SIGNIFICANT CHANGE UP
SPECIMEN SOURCE: SIGNIFICANT CHANGE UP
SPECIMEN SOURCE: SIGNIFICANT CHANGE UP
WBC # BLD: 8.01 K/UL — SIGNIFICANT CHANGE UP (ref 3.8–10.5)
WBC # BLD: 8.01 K/UL — SIGNIFICANT CHANGE UP (ref 3.8–10.5)
WBC # BLD: 8.75 K/UL — SIGNIFICANT CHANGE UP (ref 3.8–10.5)
WBC # BLD: 8.75 K/UL — SIGNIFICANT CHANGE UP (ref 3.8–10.5)
WBC # FLD AUTO: 8.01 K/UL — SIGNIFICANT CHANGE UP (ref 3.8–10.5)
WBC # FLD AUTO: 8.01 K/UL — SIGNIFICANT CHANGE UP (ref 3.8–10.5)
WBC # FLD AUTO: 8.75 K/UL — SIGNIFICANT CHANGE UP (ref 3.8–10.5)
WBC # FLD AUTO: 8.75 K/UL — SIGNIFICANT CHANGE UP (ref 3.8–10.5)

## 2023-12-30 PROCEDURE — 85014 HEMATOCRIT: CPT

## 2023-12-30 PROCEDURE — 71045 X-RAY EXAM CHEST 1 VIEW: CPT

## 2023-12-30 PROCEDURE — 85018 HEMOGLOBIN: CPT

## 2023-12-30 PROCEDURE — 80048 BASIC METABOLIC PNL TOTAL CA: CPT

## 2023-12-30 PROCEDURE — 76377 3D RENDER W/INTRP POSTPROCES: CPT

## 2023-12-30 PROCEDURE — 83605 ASSAY OF LACTIC ACID: CPT

## 2023-12-30 PROCEDURE — 85610 PROTHROMBIN TIME: CPT

## 2023-12-30 PROCEDURE — 93306 TTE W/DOPPLER COMPLETE: CPT

## 2023-12-30 PROCEDURE — 82947 ASSAY GLUCOSE BLOOD QUANT: CPT

## 2023-12-30 PROCEDURE — 82330 ASSAY OF CALCIUM: CPT

## 2023-12-30 PROCEDURE — 83735 ASSAY OF MAGNESIUM: CPT

## 2023-12-30 PROCEDURE — 73060 X-RAY EXAM OF HUMERUS: CPT

## 2023-12-30 PROCEDURE — 85027 COMPLETE CBC AUTOMATED: CPT

## 2023-12-30 PROCEDURE — 73030 X-RAY EXAM OF SHOULDER: CPT

## 2023-12-30 PROCEDURE — 85025 COMPLETE CBC W/AUTO DIFF WBC: CPT

## 2023-12-30 PROCEDURE — 36415 COLL VENOUS BLD VENIPUNCTURE: CPT

## 2023-12-30 PROCEDURE — 73552 X-RAY EXAM OF FEMUR 2/>: CPT

## 2023-12-30 PROCEDURE — 73502 X-RAY EXAM HIP UNI 2-3 VIEWS: CPT

## 2023-12-30 PROCEDURE — 73080 X-RAY EXAM OF ELBOW: CPT

## 2023-12-30 PROCEDURE — 87641 MR-STAPH DNA AMP PROBE: CPT

## 2023-12-30 PROCEDURE — 73090 X-RAY EXAM OF FOREARM: CPT

## 2023-12-30 PROCEDURE — 84100 ASSAY OF PHOSPHORUS: CPT

## 2023-12-30 PROCEDURE — 87637 SARSCOV2&INF A&B&RSV AMP PRB: CPT

## 2023-12-30 PROCEDURE — 83880 ASSAY OF NATRIURETIC PEPTIDE: CPT

## 2023-12-30 PROCEDURE — 85730 THROMBOPLASTIN TIME PARTIAL: CPT

## 2023-12-30 PROCEDURE — 84443 ASSAY THYROID STIM HORMONE: CPT

## 2023-12-30 PROCEDURE — 99285 EMERGENCY DEPT VISIT HI MDM: CPT | Mod: 25

## 2023-12-30 PROCEDURE — 87086 URINE CULTURE/COLONY COUNT: CPT

## 2023-12-30 PROCEDURE — 93970 EXTREMITY STUDY: CPT

## 2023-12-30 PROCEDURE — 84295 ASSAY OF SERUM SODIUM: CPT

## 2023-12-30 PROCEDURE — 96374 THER/PROPH/DIAG INJ IV PUSH: CPT

## 2023-12-30 PROCEDURE — 97161 PT EVAL LOW COMPLEX 20 MIN: CPT

## 2023-12-30 PROCEDURE — 82435 ASSAY OF BLOOD CHLORIDE: CPT

## 2023-12-30 PROCEDURE — 84484 ASSAY OF TROPONIN QUANT: CPT

## 2023-12-30 PROCEDURE — 70450 CT HEAD/BRAIN W/O DYE: CPT | Mod: MA

## 2023-12-30 PROCEDURE — 82803 BLOOD GASES ANY COMBINATION: CPT

## 2023-12-30 PROCEDURE — 81001 URINALYSIS AUTO W/SCOPE: CPT

## 2023-12-30 PROCEDURE — 73110 X-RAY EXAM OF WRIST: CPT

## 2023-12-30 PROCEDURE — 80053 COMPREHEN METABOLIC PANEL: CPT

## 2023-12-30 PROCEDURE — 84132 ASSAY OF SERUM POTASSIUM: CPT

## 2023-12-30 PROCEDURE — 70486 CT MAXILLOFACIAL W/O DYE: CPT | Mod: MA

## 2023-12-30 PROCEDURE — 73130 X-RAY EXAM OF HAND: CPT

## 2023-12-30 PROCEDURE — 93880 EXTRACRANIAL BILAT STUDY: CPT

## 2023-12-30 PROCEDURE — 87640 STAPH A DNA AMP PROBE: CPT

## 2023-12-30 RX ADMIN — CHLORHEXIDINE GLUCONATE 1 APPLICATION(S): 213 SOLUTION TOPICAL at 12:43

## 2023-12-30 RX ADMIN — Medication 150 MILLIGRAM(S): at 12:39

## 2023-12-30 RX ADMIN — DICLOFENAC SODIUM 75 MILLIGRAM(S): 75 TABLET, DELAYED RELEASE ORAL at 12:40

## 2023-12-30 RX ADMIN — Medication 10 MILLIGRAM(S): at 12:40

## 2023-12-30 RX ADMIN — ENOXAPARIN SODIUM 40 MILLIGRAM(S): 100 INJECTION SUBCUTANEOUS at 06:40

## 2023-12-30 RX ADMIN — Medication 81 MILLIGRAM(S): at 12:40

## 2023-12-30 RX ADMIN — DICLOFENAC SODIUM 75 MILLIGRAM(S): 75 TABLET, DELAYED RELEASE ORAL at 13:40

## 2023-12-30 NOTE — PROGRESS NOTE ADULT - SUBJECTIVE AND OBJECTIVE BOX
Date of Service  : 12-30-23     INTERVAL HPI/OVERNIGHT EVENTS: Please send me home. I feel fine.   Vital Signs Last 24 Hrs  T(C): 37 (30 Dec 2023 11:13), Max: 37 (30 Dec 2023 11:13)  T(F): 98.6 (30 Dec 2023 11:13), Max: 98.6 (30 Dec 2023 11:13)  HR: 99 (30 Dec 2023 11:13) (98 - 105)  BP: 96/63 (30 Dec 2023 11:13) (96/63 - 133/57)  BP(mean): --  RR: 18 (30 Dec 2023 11:13) (18 - 18)  SpO2: 99% (30 Dec 2023 11:13) (95% - 99%)    Parameters below as of 30 Dec 2023 11:13  Patient On (Oxygen Delivery Method): room air      I&O's Summary    29 Dec 2023 07:01  -  30 Dec 2023 07:00  --------------------------------------------------------  IN: 120 mL / OUT: 0 mL / NET: 120 mL    30 Dec 2023 07:01  -  30 Dec 2023 16:08  --------------------------------------------------------  IN: 480 mL / OUT: 0 mL / NET: 480 mL      MEDICATIONS  (STANDING):  aspirin enteric coated 81 milliGRAM(s) Oral daily  atorvastatin 20 milliGRAM(s) Oral at bedtime  chlorhexidine 2% Cloths 1 Application(s) Topical daily  diclofenac 75 milliGRAM(s) Oral daily  enoxaparin Injectable 40 milliGRAM(s) SubCutaneous every 24 hours  misoprostol 200 MICROGram(s) Oral every 6 hours  oxybutynin XL 10 milliGRAM(s) Oral daily  venlafaxine XR. 150 milliGRAM(s) Oral daily    MEDICATIONS  (PRN):  zolpidem 5 milliGRAM(s) Oral at bedtime PRN Insomnia    LABS:                        8.4    8.01  )-----------( 166      ( 30 Dec 2023 12:06 )             25.9     12-29    138  |  101  |  25<H>  ----------------------------<  106<H>  3.9   |  26  |  0.66    Ca    8.8      29 Dec 2023 13:19  Phos  3.9     12-28  Mg     2.0     12-28    TPro  5.5<L>  /  Alb  3.5  /  TBili  0.5  /  DBili  x   /  AST  18  /  ALT  16  /  AlkPhos  89  12-28    PT/INR - ( 28 Dec 2023 16:19 )   PT: 11.1 sec;   INR: 1.06 ratio         PTT - ( 28 Dec 2023 16:19 )  PTT:32.8 sec  Urinalysis Basic - ( 29 Dec 2023 13:19 )    Color: x / Appearance: x / SG: x / pH: x  Gluc: 106 mg/dL / Ketone: x  / Bili: x / Urobili: x   Blood: x / Protein: x / Nitrite: x   Leuk Esterase: x / RBC: x / WBC x   Sq Epi: x / Non Sq Epi: x / Bacteria: x      CAPILLARY BLOOD GLUCOSE            Urinalysis Basic - ( 29 Dec 2023 13:19 )    Color: x / Appearance: x / SG: x / pH: x  Gluc: 106 mg/dL / Ketone: x  / Bili: x / Urobili: x   Blood: x / Protein: x / Nitrite: x   Leuk Esterase: x / RBC: x / WBC x   Sq Epi: x / Non Sq Epi: x / Bacteria: x      REVIEW OF SYSTEMS:  CONSTITUTIONAL: No fever, weight loss, or fatigue  EYES: No eye pain, visual disturbances, or discharge  ENMT:  No difficulty hearing, tinnitus, vertigo; No sinus or throat pain  NECK: No pain or stiffness  RESPIRATORY: No cough, wheezing, chills or hemoptysis; No shortness of breath  CARDIOVASCULAR: No chest pain, palpitations, dizziness, or leg swelling  GASTROINTESTINAL: No abdominal or epigastric pain. No nausea, vomiting, or hematemesis; No diarrhea or constipation. No melena or hematochezia.  GENITOURINARY: No dysuria, frequency, hematuria, or incontinence  NEUROLOGICAL: No headaches, memory loss, loss of strength, numbness, or tremors      Consultant(s) Notes Reviewed:  [x ] YES  [ ] NO    PHYSICAL EXAM:  GENERAL: NAD, well-groomed, well-developed,not in any distress ,  HEAD:  Atraumatic, Normocephalic  EYES: EOMI, PERRLA, conjunctiva and sclera clear  ENMT: No tonsillar erythema, exudates, or enlargement; Moist mucous membranes, Good dentition, No lesions  NECK: Supple, No JVD, Normal thyroid  NERVOUS SYSTEM:  Alert & Oriented X3, No focal deficit   CHEST/LUNG: Good air entry bilateral with no  rales, rhonchi, wheezing, or rubs  HEART: Regular rate and rhythm; No murmurs, rubs, or gallops  ABDOMEN: Soft, Nontender, Nondistended; Bowel sounds present  EXTREMITIES:  2+ Peripheral Pulses, No clubbing, cyanosis, or edema    Care Discussed with Consultants/Other Providers [ x] YES  [ ] NO

## 2023-12-30 NOTE — PROGRESS NOTE ADULT - ASSESSMENT
83 y/o female with HTN, divertic c/b abscess requiring hemicolectomy further c/b empyema requiring thoracotomy  HLD,  b/l shoulder replacement, b/l knee replacement  presenting   s/p unwitnessed fall x2  now c/o RUE pain.  1st fall states she woke up 6AM to use bathroom, tripped over her feat, mechanical fall, no LOC, + incontinenece bc she was going to bathroom, no convuylsions or post ictal confusion   2nd syncopal event/fall with + LOC, no tongue bite no incontinece but couldn't get up   12/28 CTH neg and CT maxillo facial neg   12/28 CD neg   TTE as above done 12/29     Imprssion:   SYncope and fall , doubt seizure     - check orthostatics   - limit sedating meds. takes ambien at night PRN   - cardio eval  - may benefit form extended cardiac monitoring   - douesn't sound like seizure, can pursue EEG outpatient in office    - telemetry  - PT/OT   - check FS, glucose control <180  - GI/DVT ppx   . Call with questions.   - outpatient f/u with Dr. Michael on discharge     Ruben Michael MD  Vascular Neurology  Office: [phone]  83 y/o female with HTN, divertic c/b abscess requiring hemicolectomy further c/b empyema requiring thoracotomy  HLD,  b/l shoulder replacement, b/l knee replacement  presenting   s/p unwitnessed fall x2  now c/o RUE pain.  1st fall states she woke up 6AM to use bathroom, tripped over her feat, mechanical fall, no LOC, + incontinenece bc she was going to bathroom, no convuylsions or post ictal confusion   2nd syncopal event/fall with + LOC, no tongue bite no incontinece but couldn't get up   12/28 CTH neg and CT maxillo facial neg   12/28 CD neg   TTE as above done 12/29     Imprssion:   SYncope and fall , doubt seizure     - check orthostatics   - limit sedating meds. takes ambien at night PRN   - cardio eval  - may benefit form extended cardiac monitoring   - douesn't sound like seizure, can pursue EEG outpatient in office    - telemetry  - PT/OT   - check FS, glucose control <180  - GI/DVT ppx   . Call with questions.   - outpatient f/u with Dr. Michael on discharge     Ruben Michael MD  Vascular Neurology  Office: [phone]

## 2023-12-30 NOTE — DISCHARGE NOTE NURSING/CASE MANAGEMENT/SOCIAL WORK - NSDCPEFALRISK_GEN_ALL_CORE
For information on Fall & Injury Prevention, visit: [email or web address] OR  [email or web address] OR  https://www.cdc.gov/steadi/patient.html For information on Fall & Injury Prevention, visit: [email or web address] OR  [email or web address] OR  https://www.cdc.gov/steadi/patient.html

## 2023-12-30 NOTE — DISCHARGE NOTE PROVIDER - NSDCCPCAREPLAN_GEN_ALL_CORE_FT
PRINCIPAL DISCHARGE DIAGNOSIS  Diagnosis: Syncope  Assessment and Plan of Treatment: Carotid doppler negative for stenosis   Duplex Lower  extremities negative for DVT   Head CT negative for bleed or mass affect   Outpatient follow upwith cardology for holter monitoring   Outpatient follow up with Neurololgy for EEG         SECONDARY DISCHARGE DIAGNOSES  Diagnosis: HTN (hypertension)  Assessment and Plan of Treatment: Follow up with your medical doctor to establish long term blood pressure treatment goals.  Continue to Amlodipine with hold paramenter : Hold for SBP less than 100   Follow up with Cardiologist as to when to resume    Diagnosis: Multiple falls  Assessment and Plan of Treatment: CT HEAD: No acute intracranial hemorrhage  CT MAXILLOFACIAL: No acute facial or orbital fracture.  Xray of shoulder: . No acute osseous abnormalities.  Outpatient physical therapy    Diagnosis: Anemia  Assessment and Plan of Treatment: Patient with low Hgb 8.4   Please follow up with your GI doctor for further management

## 2023-12-30 NOTE — DISCHARGE NOTE PROVIDER - HOSPITAL COURSE
HPI:  81 y/o female PMHx HTN, divertic c/b abscess requiring hemicolectomy further c/b empyema requiring thoracotomy  HLD, PSx hysterectomy, b/l shoulder replacement, b/l knee replacement now presenting to the ED via EMS s/p unwitnessed fall today now c/o RUE pain. Patient reported she had mechanical fall onto right side this morning with carpeted floor  this morning with head strike and was assisted back into bed by EMS. Patient then had another fall but cannot recall how or when she fell. Patient was able to ambulate after the fall. Does not ambulate with assisted devices inside the house and lives by herself with family nearby. Denied CP, SOB, abdominal pain, N/V/D, headache, fever, cough, urinary symptoms, urinary fecal incontinence, back pain, neck pain, pelvic pain (28 Dec 2023 20:07)    Hospital Course:  81 y/o female with history as stated above, seen by cardiology, - Echo 12/2023   EF 74% shows a hyperdynamic LV with mild to moderate MS. Carotid doppler  negative for stenosis , LE duplex negative for DVT.  Patient to follow up outpatient with her Cardiologist Dr. Traube for placement of holter monitor.    Seen by neurologist   CT HEAD: No acute intracranial hemorrhage, CT MAXILLOFACIAL: No acute facial or orbital fracture., Xray of shoulder:  No acute osseous abnormalities. Per neuro patient will follow up outpatient for EEG. She is stable for discharge with outpatient PT     Important Medication Changes and Reason:  Norvasc hold for SBP< 100   Continue home medications  Active or Pending Issues Requiring Follow-up:  Outpatient follow up with GI due to low Hgb 8.4   Outpatient follow up with Cardiology for Holter Monitor placement     Advanced Directives:   [ x] Full code  [ ] DNR  [ ] Hospice    Discharge Diagnoses:  syncope   Multiple falls          HPI:  83 y/o female PMHx HTN, divertic c/b abscess requiring hemicolectomy further c/b empyema requiring thoracotomy  HLD, PSx hysterectomy, b/l shoulder replacement, b/l knee replacement now presenting to the ED via EMS s/p unwitnessed fall today now c/o RUE pain. Patient reported she had mechanical fall onto right side this morning with carpeted floor  this morning with head strike and was assisted back into bed by EMS. Patient then had another fall but cannot recall how or when she fell. Patient was able to ambulate after the fall. Does not ambulate with assisted devices inside the house and lives by herself with family nearby. Denied CP, SOB, abdominal pain, N/V/D, headache, fever, cough, urinary symptoms, urinary fecal incontinence, back pain, neck pain, pelvic pain (28 Dec 2023 20:07)    Hospital Course:  83 y/o female with history as stated above, seen by cardiology, - Echo 12/2023   EF 74% shows a hyperdynamic LV with mild to moderate MS. Carotid doppler  negative for stenosis , LE duplex negative for DVT.  Patient to follow up outpatient with her Cardiologist Dr. Traube for placement of holter monitor.    Seen by neurologist   CT HEAD: No acute intracranial hemorrhage, CT MAXILLOFACIAL: No acute facial or orbital fracture., Xray of shoulder:  No acute osseous abnormalities. Per neuro patient will follow up outpatient for EEG. She is stable for discharge with outpatient PT     Important Medication Changes and Reason:  Norvasc hold for SBP< 100   Continue home medications  Active or Pending Issues Requiring Follow-up:  Outpatient follow up with GI due to low Hgb 8.4   Outpatient follow up with Cardiology for Holter Monitor placement     Advanced Directives:   [ x] Full code  [ ] DNR  [ ] Hospice    Discharge Diagnoses:  syncope   Multiple falls

## 2023-12-30 NOTE — DISCHARGE NOTE NURSING/CASE MANAGEMENT/SOCIAL WORK - PATIENT PORTAL LINK FT
You can access the FollowMyHealth Patient Portal offered by [facility] by registering at the following website: [email or web address]. By joining [employer]’s FollowMyHealth portal, you will also be able to view your health information using other applications (apps) compatible with our system. You can access the FollowMyHealth Patient Portal offered by [facility] by registering at the following website: [email or web address]. By joining [employer]’s FollowMyHealth portal, you will also be able to view your health information using other applications (apps) compatible with our system.

## 2023-12-30 NOTE — DISCHARGE NOTE PROVIDER - PROVIDER TOKENS
PROVIDER:[TOKEN:[[id number]],FOLLOWUP:[1 week]],PROVIDER:[TOKEN:[[id number]],FOLLOWUP:[1 week]] PROVIDER:[TOKEN:[[id number]],FOLLOWUP:[1 week]],PROVIDER:[TOKEN:[[id number]],FOLLOWUP:[1 week]] PROVIDER:[TOKEN:[[id number]],FOLLOWUP:[1 week]],PROVIDER:[TOKEN:[[id number]],FOLLOWUP:[1 week]],PROVIDER:[TOKEN:[[id number]],FOLLOWUP:[Routine]] PROVIDER:[TOKEN:[[id number]],FOLLOWUP:[1 week]],PROVIDER:[TOKEN:[[id number]],FOLLOWUP:[1 week]],PROVIDER:[TOKEN:[[id number]],FOLLOWUP:[Routine]]

## 2023-12-30 NOTE — DISCHARGE NOTE PROVIDER - NSDCMRMEDTOKEN_GEN_ALL_CORE_FT
amLODIPine 5 mg oral tablet: 1 tab(s) orally once a day Hold for SBP less than 100  aspirin 81 mg oral delayed release tablet: 1 tab(s) orally once a day  atorvastatin 20 mg oral tablet: 1 tab(s) orally once a day (at bedtime)  diclofenac sodium 75 mg oral delayed release tablet: 1 tab(s) orally once a day - taken with misoprostol 200mcg  miSOPROStol 200 mcg oral tablet: 1 tab(s) orally once a day - taken with diclofenac 75mg  Camilla 128 5% ophthalmic solution: 1 drop(s) in each eye once a day  otc supplements: multivitamin / vitamin d3  oxyBUTYnin 10 mg/24 hr oral tablet, extended release: 1 tab(s) orally 2 times a day  Refresh ophthalmic solution: 1 drop(s) in each eye 3 times a day  triamterene-hydrochlorothiazide 37.5 mg-25 mg oral tablet: 1 tab(s) orally once a day  venlafaxine 150 mg oral capsule, extended release: 1 cap(s) orally once a day  zolpidem 10 mg oral tablet: 0.5 tab(s) orally once a day (at bedtime)   amLODIPine 5 mg oral tablet: 1 tab(s) orally once a day Hold for SBP less than 100  aspirin 81 mg oral delayed release tablet: 1 tab(s) orally once a day  atorvastatin 20 mg oral tablet: 1 tab(s) orally once a day (at bedtime)  diclofenac sodium 75 mg oral delayed release tablet: 1 tab(s) orally once a day - taken with misoprostol 200mcg  miSOPROStol 200 mcg oral tablet: 1 tab(s) orally once a day - taken with diclofenac 75mg  Camilla 128 5% ophthalmic solution: 1 drop(s) in each eye once a day  otc supplements: multivitamin / vitamin d3  oxyBUTYnin 10 mg/24 hr oral tablet, extended release: 1 tab(s) orally 2 times a day  Physical Therapy: outpatient  Refresh ophthalmic solution: 1 drop(s) in each eye 3 times a day  triamterene-hydrochlorothiazide 37.5 mg-25 mg oral tablet: 1 tab(s) orally once a day  venlafaxine 150 mg oral capsule, extended release: 1 cap(s) orally once a day  zolpidem 10 mg oral tablet: 0.5 tab(s) orally once a day (at bedtime)

## 2023-12-30 NOTE — DISCHARGE NOTE PROVIDER - CARE PROVIDER_API CALL
Ruben Michael  Neurology  [address] [facility] [address] [facility]  [place] [address]  Phone: [phone]  Fax: [phone]  Follow Up Time: 1 week    Traube, Charles  Cardiovascular Disease  [address] [place] [address]  Phone: [phone]  Fax: [phone]  Follow Up Time: 1 week   Ruben Michael  Neurology  [address] [facility]  [place] [address]  Phone: [phone]  Fax: [phone]  Follow Up Time: 1 week    Traube, Charles  Cardiovascular Disease  [address] [place] [address]  Phone: [phone]  Fax: [phone]  Follow Up Time: 1 week   Ruben Mcihael  Neurology  [address] [facility], [address]  [place] [address]  Phone: [phone]  Fax: [phone]  Follow Up Time: 1 week    Traube, Charles  Cardiovascular Disease  [address] [place] [address]  Phone: [phone]  Fax: [phone]  Follow Up Time: 1 week    Paramjit Fitzptarick  Pulmonary Disease  [address] [place] [address]  Phone: [phone]  Fax: [phone]  Follow Up Time: Routine   Ruben Michael  Neurology  [address] [facility]  [place] [address]  Phone: [phone]  Fax: [phone]  Follow Up Time: 1 week    Traube, Charles  Cardiovascular Disease  [address] [place] [address]  Phone: [phone]  Fax: [phone]  Follow Up Time: 1 week    Paramjit Fitzpatrick  Pulmonary Disease  [address] [place] [address]  Phone: [phone]  Fax: [phone]  Follow Up Time: Routine

## 2023-12-30 NOTE — DISCHARGE NOTE PROVIDER - NSDCFUADDAPPT_GEN_ALL_CORE_FT
APPTS ARE READY TO BE MADE: [ x] YES    Best Family or Patient Contact (if needed):    Additional Information about above appointments (if needed):    1:   2:   3:     Other comments or requests:    APPTS ARE READY TO BE MADE: [ x] YES    Best Family or Patient Contact (if needed):    Additional Information about above appointments (if needed):    1:   2:   3:     Other comments or requests:   Patient advised she was readmitted to the hospital and would prefer to schedule her own follow up appointments with her established providers. The patient declined to obtain our contact information.

## 2023-12-30 NOTE — PROGRESS NOTE ADULT - SUBJECTIVE AND OBJECTIVE BOX
Neurology        S: benny seen. doing okay       Medications: MEDICATIONS  (STANDING):  aspirin enteric coated 81 milliGRAM(s) Oral daily  atorvastatin 20 milliGRAM(s) Oral at bedtime  chlorhexidine 2% Cloths 1 Application(s) Topical daily  diclofenac 75 milliGRAM(s) Oral daily  enoxaparin Injectable 40 milliGRAM(s) SubCutaneous every 24 hours  misoprostol 200 MICROGram(s) Oral every 6 hours  oxybutynin XL 10 milliGRAM(s) Oral daily  venlafaxine XR. 150 milliGRAM(s) Oral daily    MEDICATIONS  (PRN):  zolpidem 5 milliGRAM(s) Oral at bedtime PRN Insomnia       Vitals:  Vital Signs Last 24 Hrs  T(C): 37.1 (30 Dec 2023 17:00), Max: 37.1 (30 Dec 2023 17:00)  T(F): 98.7 (30 Dec 2023 17:00), Max: 98.7 (30 Dec 2023 17:00)  HR: 86 (30 Dec 2023 17:00) (86 - 99)  BP: 122/75 (30 Dec 2023 17:00) (96/63 - 133/57)  BP(mean): --  RR: 18 (30 Dec 2023 17:00) (18 - 18)  SpO2: 98% (30 Dec 2023 17:00) (95% - 99%)    Parameters below as of 30 Dec 2023 17:00  Patient On (Oxygen Delivery Method): room air          General Exam:   General Appearance: Appropriately dressed and in no acute distress       Head: Normocephalic, atraumatic and no dysmorphic features  Ear, Nose, and Throat: Moist mucous membranes  CVS: S1S2+  Resp: No SOB, no wheeze or rhonchi  GI: soft NT/ND  Extremities: No edema or cyanosis  Skin: No bruises or rashes     Neurological Exam:  Mental Status: Awake, alert and oriented x 3.  Able to follow simple and complex verbal commands. Able to name and repeat. fluent speech. No obvious aphasia or dysarthria noted.   Cranial Nerves: PERRL, EOMI, VFFC, sensation V1-V3 intact,  no obvious facial asymmetry, equal elevation of palate, scm/trap 5/5, tongue is midline on protrusion. no obvious papilledema on fundoscopic exam. hearing is grossly intact.   Motor: Normal bulk, tone and strength throughout except pain limited RUE weakness   Sensation: Intact to light touch and pinprick throughout. no right/left confusion. no extinction to tactile on DSS. Romberg was negative.   Reflexes: 1+ throughout at biceps, brachioradialis, triceps, patellars and ankles bilaterally and equal. No clonus. R toe and L toe were both downgoing.  Coordination: No dysmetria on FNF or HKS  Gait: Narrow based and steady. Able to tandem. no limitations in gait.     Data/Labs/Imaging which I personally reviewed.     Labs:       LABS:                          8.4    8.01  )-----------( 166      ( 30 Dec 2023 12:06 )             25.9     12-29    138  |  101  |  25<H>  ----------------------------<  106<H>  3.9   |  26  |  0.66    Ca    8.8      29 Dec 2023 13:19          Urinalysis Basic - ( 29 Dec 2023 13:19 )    Color: x / Appearance: x / SG: x / pH: x  Gluc: 106 mg/dL / Ketone: x  / Bili: x / Urobili: x   Blood: x / Protein: x / Nitrite: x   Leuk Esterase: x / RBC: x / WBC x   Sq Epi: x / Non Sq Epi: x / Bacteria: x        < from: TTE W or WO Ultrasound Enhancing Agent (12.29.23 @ 11:11) >    TRANSTHORACIC ECHOCARDIOGRAM REPORT  ________________________________________________________________________________                                      _______       Pt. Name:       KAPIL GRIJALVA Study Date:    12/29/2023  MRN:            [id number]      YOB: 1940  Accession #:    [id number]       Age:           83 years  Account#:       [id number]    Gender:        F  Heart Rate:     66 bpm          Height:        60.00 in (152.40 cm)  Rhythm:                         Weight:        175.00 lb (79.38 kg)  Blood Pressure: 97/54 mmHg      BSA/BMI:       1.76 m² / 34.18 kg/m²  ________________________________________________________________________________________  Referring Physician:    [id number] Nish Tay  Interpreting Physician: Horacio Calderon M.D.  Primary Sonographer:    Belen Mata RDCS    CPT:               ECHO TTE WO CON COMP W DOPP - 22365.m  Indication(s):     Syncope and collapse - R55  Procedure:         Transthoracic echocardiogram with 2-D, M-modeand complete                     spectral and color flow Doppler.  Ordering Location: [facility]  Study Information: Image quality for this study is fair.    _______________________________________________________________________________________     CONCLUSIONS:      1. Left ventricular cavity is normal. Left ventricular wall thickness is normal. Left ventricular systolic function is normal with an ejection fraction of 74 % by Rosenbaum's method of disks. There are no regional wall motion abnormalities seen.   2. There is moderate (grade 2) left ventricular diastolic dysfunction, with normal filling pressure.   3. Normal right ventricular cavity size, wall thickness, and systolic function.   4. The left atrium is severely dilated.   5. Moderate mitral regurgitation.   6. Mild to moderate mitral valve stenosis.   7. No pericardial effusion seen.   8. Estimated pulmonary artery systolic pressure is 40 mmHg.   9. No prior echocardiogram is available for comparison.    ________________________________________________________________________________________  FINDINGS:     Left Ventricle:  The left ventricular cavity is normal. Left ventricular wall thickness is normal. Left ventricular systolic function is normal with a calculated ejection fraction of 74 % by the Rosenbaum's biplane method of disks. There are no regional wall motion abnormalities seen. There is moderate (grade 2) left ventricular diastolic dysfunction, with normal filling pressure.     Right Ventricle:  The right ventricle is not well visualized. The right ventricular cavity is normal in size, normal wall thickness and normal systolic function. Tricuspid annular plane systolic excursion (TAPSE) is 2.0 cm (normal >=1.7 cm). Tricuspid annular tissue Doppler S' is 20.0 cm/s (normal >10 cm/s).     Left Atrium:  The left atrium is severely dilated with an indexed volume of 96.39 ml/m².     Right Atrium:  The right atrium is normal in size with an indexed volume of 22.11 ml/m².     Interatrial Septum:  The interatrial septum appears intact.     Aortic Valve:  The aortic valve appears trileaflet. There is mild calcification of the aortic valve leaflets. There is mild aortic stenosis. The aortic valve area is estimated at 1.77 cm² by the continuity equation. There is trace aortic regurgitation.     Mitral Valve:  There is mild to moderate mitral valve stenosis. The mitral valve peak gradient is 20.8 mmHg and a mean gradient is 5.60 mmHg at a heart rate of 72 bpm. There is moderate mitral regurgitation.     Tricuspid Valve:  Structurally normal tricuspid valve with normal leaflet excursion. There is mild to moderate tricuspid regurgitation. Estimated pulmonary artery systolic pressure is 40 mmHg.     Pulmonic Valve:  There is mild to moderate pulmonic regurgitation.    Aorta:  The aortic annulus and aortic root appear normal in size.     Pericardium:  No pericardial effusion seen.     Systemic Veins:  The inferior vena cava is normal in size (normal <2.1cm) with normal inspiratory collapse (normal >50%) consistent with normal right atrial pressure (~3, range 0-5mmHg).  ____________________________________________________________________  QUANTITATIVE DATA:  Left Ventricle Measurements: (Indexed to BSA)     IVSd (2D):   1.0 cm  LVPWd (2D):  1.2 cm  LVIDd (2D): 4.1 cm  LVIDs (2D):  2.8 cm  LV Mass:     145 g  82.2 g/m²  LV Vol d, MOD A2C: 40.7 ml 23.08 ml/m²  LV Vol d, MOD A4C: 44.2 ml 25.06 ml/m²  LV Vol d, MOD BP:  46.1 ml 26.11 ml/m²  LV Vol s, MOD A2C: 11.9 ml 6.75 ml/m²  LV Vol s, MOD A4C: 10.7 ml 6.07 ml/m²  LV Vol s, MOD BP:  11.8 ml 6.67 ml/m²  LVOT SV MOD BP:    34.3 ml  LV EF% MOD BP:     74 %     MV E Vmax:    1.83 m/s  MV A Vmax:    1.61 m/s  MV E/A:       1.14  e' lateral:   6.20 cm/s  e' medial:    5.00 cm/s  E/e' lateral: 29.52  E/e' medial:  36.60  E/e' Average: 32.68    Aorta Measurements: (normal range) (Indexed to BSA)     Sinuses of Valsalva: 3.10 cm (2.7 - 3.3 cm)  Ao Asc prox:         3.50 cm       Left Atrium Measurements: (Indexed to BSA)  LA Diam 2D:        5.10 cm  LA Vol s, MOD A4C: 196.00 ml.  LA Vol s, MOD A2C: 143.00 ml.  LA Vol s, MOD BP:  170.00 ml  96.39 ml/m²    Right Ventricle Measurements: Right Atrial Measurements:     TAPSE:      2.0 cm            RA Vol:       39.00 ml  TV Lefty. S': 19.70 cm/s        RA VolIndex: 22.11 ml/m²       LVOT / RVOT/ Qp/Qs Data: (Indexed to BSA)  LVOT Diameter: 2.00 cm  LVOT Vmax:     1.15 m/s  LVOT VTI:      24.00 cm  LVOT SV:       75.4 ml  42.75 ml/m²    Aortic Valve Measurements:  AV Vmax:                2.3 m/s  AV Peak Gradient:       21.3 mmHg  AV Mean Gradient:       13.0 mmHg  AV VTI:                 42.5 cm  AV VTI Ratio:           0.56  AoV EOA, Contin:        1.77 cm²  AoV EOA, Contin i:      1.01 cm²/m²  AoV Dimensionless Index 0.56    Mitral Valve Measurements:     MV Vmax:      2.28 m/s     MR Vmax:          5.94 m/s  MV VTI, lefty   0.610 m      MR VTI:           167.00 cm  MV Mean Grad: 5.60 mmHg    MR Mean Gradient: 100.0 mmHg  MV Peak Grad: 20.8 mmHg    MR Peak Gradient: 141.1 mmHg  MV E Vmax:    1.8 m/s  MV A Vmax:    1.6 m/s  MV E/A:       1.1       Tricuspid Valve Measurements:     TR Vmax:          3.1 m/s  TR Peak Gradient: 37.5 mmHg  RA Pressure:      3 mmHg  PASP:             40 mmHg    ________________________________________________________________________________________  Electronically signed on 12/29/2023 at 1:45:41 PM by Horacio Calderon M.D.         *** Final ***    < end of copied text >   Neurology        S: benny seen. doing okay       Medications: MEDICATIONS  (STANDING):  aspirin enteric coated 81 milliGRAM(s) Oral daily  atorvastatin 20 milliGRAM(s) Oral at bedtime  chlorhexidine 2% Cloths 1 Application(s) Topical daily  diclofenac 75 milliGRAM(s) Oral daily  enoxaparin Injectable 40 milliGRAM(s) SubCutaneous every 24 hours  misoprostol 200 MICROGram(s) Oral every 6 hours  oxybutynin XL 10 milliGRAM(s) Oral daily  venlafaxine XR. 150 milliGRAM(s) Oral daily    MEDICATIONS  (PRN):  zolpidem 5 milliGRAM(s) Oral at bedtime PRN Insomnia       Vitals:  Vital Signs Last 24 Hrs  T(C): 37.1 (30 Dec 2023 17:00), Max: 37.1 (30 Dec 2023 17:00)  T(F): 98.7 (30 Dec 2023 17:00), Max: 98.7 (30 Dec 2023 17:00)  HR: 86 (30 Dec 2023 17:00) (86 - 99)  BP: 122/75 (30 Dec 2023 17:00) (96/63 - 133/57)  BP(mean): --  RR: 18 (30 Dec 2023 17:00) (18 - 18)  SpO2: 98% (30 Dec 2023 17:00) (95% - 99%)    Parameters below as of 30 Dec 2023 17:00  Patient On (Oxygen Delivery Method): room air          General Exam:   General Appearance: Appropriately dressed and in no acute distress       Head: Normocephalic, atraumatic and no dysmorphic features  Ear, Nose, and Throat: Moist mucous membranes  CVS: S1S2+  Resp: No SOB, no wheeze or rhonchi  GI: soft NT/ND  Extremities: No edema or cyanosis  Skin: No bruises or rashes     Neurological Exam:  Mental Status: Awake, alert and oriented x 3.  Able to follow simple and complex verbal commands. Able to name and repeat. fluent speech. No obvious aphasia or dysarthria noted.   Cranial Nerves: PERRL, EOMI, VFFC, sensation V1-V3 intact,  no obvious facial asymmetry, equal elevation of palate, scm/trap 5/5, tongue is midline on protrusion. no obvious papilledema on fundoscopic exam. hearing is grossly intact.   Motor: Normal bulk, tone and strength throughout except pain limited RUE weakness   Sensation: Intact to light touch and pinprick throughout. no right/left confusion. no extinction to tactile on DSS. Romberg was negative.   Reflexes: 1+ throughout at biceps, brachioradialis, triceps, patellars and ankles bilaterally and equal. No clonus. R toe and L toe were both downgoing.  Coordination: No dysmetria on FNF or HKS  Gait: Narrow based and steady. Able to tandem. no limitations in gait.     Data/Labs/Imaging which I personally reviewed.     Labs:       LABS:                          8.4    8.01  )-----------( 166      ( 30 Dec 2023 12:06 )             25.9     12-29    138  |  101  |  25<H>  ----------------------------<  106<H>  3.9   |  26  |  0.66    Ca    8.8      29 Dec 2023 13:19          Urinalysis Basic - ( 29 Dec 2023 13:19 )    Color: x / Appearance: x / SG: x / pH: x  Gluc: 106 mg/dL / Ketone: x  / Bili: x / Urobili: x   Blood: x / Protein: x / Nitrite: x   Leuk Esterase: x / RBC: x / WBC x   Sq Epi: x / Non Sq Epi: x / Bacteria: x        < from: TTE W or WO Ultrasound Enhancing Agent (12.29.23 @ 11:11) >    TRANSTHORACIC ECHOCARDIOGRAM REPORT  ________________________________________________________________________________                                      _______       Pt. Name:       KAPIL GRIJALVA Study Date:    12/29/2023  MRN:            [id number]      YOB: 1940  Accession #:    [id number]       Age:           83 years  Account#:       [id number]    Gender:        F  Heart Rate:     66 bpm          Height:        60.00 in (152.40 cm)  Rhythm:                         Weight:        175.00 lb (79.38 kg)  Blood Pressure: 97/54 mmHg      BSA/BMI:       1.76 m² / 34.18 kg/m²  ________________________________________________________________________________________  Referring Physician:    [id number] Nish Tay  Interpreting Physician: Horacio Calderon M.D.  Primary Sonographer:    Belen Mata RDCS    CPT:               ECHO TTE WO CON COMP W DOPP - 48375.m  Indication(s):     Syncope and collapse - R55  Procedure:         Transthoracic echocardiogram with 2-D, M-modeand complete                     spectral and color flow Doppler.  Ordering Location: [facility]  Study Information: Image quality for this study is fair.    _______________________________________________________________________________________     CONCLUSIONS:      1. Left ventricular cavity is normal. Left ventricular wall thickness is normal. Left ventricular systolic function is normal with an ejection fraction of 74 % by Rosenbaum's method of disks. There are no regional wall motion abnormalities seen.   2. There is moderate (grade 2) left ventricular diastolic dysfunction, with normal filling pressure.   3. Normal right ventricular cavity size, wall thickness, and systolic function.   4. The left atrium is severely dilated.   5. Moderate mitral regurgitation.   6. Mild to moderate mitral valve stenosis.   7. No pericardial effusion seen.   8. Estimated pulmonary artery systolic pressure is 40 mmHg.   9. No prior echocardiogram is available for comparison.    ________________________________________________________________________________________  FINDINGS:     Left Ventricle:  The left ventricular cavity is normal. Left ventricular wall thickness is normal. Left ventricular systolic function is normal with a calculated ejection fraction of 74 % by the Rosenbaum's biplane method of disks. There are no regional wall motion abnormalities seen. There is moderate (grade 2) left ventricular diastolic dysfunction, with normal filling pressure.     Right Ventricle:  The right ventricle is not well visualized. The right ventricular cavity is normal in size, normal wall thickness and normal systolic function. Tricuspid annular plane systolic excursion (TAPSE) is 2.0 cm (normal >=1.7 cm). Tricuspid annular tissue Doppler S' is 20.0 cm/s (normal >10 cm/s).     Left Atrium:  The left atrium is severely dilated with an indexed volume of 96.39 ml/m².     Right Atrium:  The right atrium is normal in size with an indexed volume of 22.11 ml/m².     Interatrial Septum:  The interatrial septum appears intact.     Aortic Valve:  The aortic valve appears trileaflet. There is mild calcification of the aortic valve leaflets. There is mild aortic stenosis. The aortic valve area is estimated at 1.77 cm² by the continuity equation. There is trace aortic regurgitation.     Mitral Valve:  There is mild to moderate mitral valve stenosis. The mitral valve peak gradient is 20.8 mmHg and a mean gradient is 5.60 mmHg at a heart rate of 72 bpm. There is moderate mitral regurgitation.     Tricuspid Valve:  Structurally normal tricuspid valve with normal leaflet excursion. There is mild to moderate tricuspid regurgitation. Estimated pulmonary artery systolic pressure is 40 mmHg.     Pulmonic Valve:  There is mild to moderate pulmonic regurgitation.    Aorta:  The aortic annulus and aortic root appear normal in size.     Pericardium:  No pericardial effusion seen.     Systemic Veins:  The inferior vena cava is normal in size (normal <2.1cm) with normal inspiratory collapse (normal >50%) consistent with normal right atrial pressure (~3, range 0-5mmHg).  ____________________________________________________________________  QUANTITATIVE DATA:  Left Ventricle Measurements: (Indexed to BSA)     IVSd (2D):   1.0 cm  LVPWd (2D):  1.2 cm  LVIDd (2D): 4.1 cm  LVIDs (2D):  2.8 cm  LV Mass:     145 g  82.2 g/m²  LV Vol d, MOD A2C: 40.7 ml 23.08 ml/m²  LV Vol d, MOD A4C: 44.2 ml 25.06 ml/m²  LV Vol d, MOD BP:  46.1 ml 26.11 ml/m²  LV Vol s, MOD A2C: 11.9 ml 6.75 ml/m²  LV Vol s, MOD A4C: 10.7 ml 6.07 ml/m²  LV Vol s, MOD BP:  11.8 ml 6.67 ml/m²  LVOT SV MOD BP:    34.3 ml  LV EF% MOD BP:     74 %     MV E Vmax:    1.83 m/s  MV A Vmax:    1.61 m/s  MV E/A:       1.14  e' lateral:   6.20 cm/s  e' medial:    5.00 cm/s  E/e' lateral: 29.52  E/e' medial:  36.60  E/e' Average: 32.68    Aorta Measurements: (normal range) (Indexed to BSA)     Sinuses of Valsalva: 3.10 cm (2.7 - 3.3 cm)  Ao Asc prox:         3.50 cm       Left Atrium Measurements: (Indexed to BSA)  LA Diam 2D:        5.10 cm  LA Vol s, MOD A4C: 196.00 ml.  LA Vol s, MOD A2C: 143.00 ml.  LA Vol s, MOD BP:  170.00 ml  96.39 ml/m²    Right Ventricle Measurements: Right Atrial Measurements:     TAPSE:      2.0 cm            RA Vol:       39.00 ml  TV Lefty. S': 19.70 cm/s        RA VolIndex: 22.11 ml/m²       LVOT / RVOT/ Qp/Qs Data: (Indexed to BSA)  LVOT Diameter: 2.00 cm  LVOT Vmax:     1.15 m/s  LVOT VTI:      24.00 cm  LVOT SV:       75.4 ml  42.75 ml/m²    Aortic Valve Measurements:  AV Vmax:                2.3 m/s  AV Peak Gradient:       21.3 mmHg  AV Mean Gradient:       13.0 mmHg  AV VTI:                 42.5 cm  AV VTI Ratio:           0.56  AoV EOA, Contin:        1.77 cm²  AoV EOA, Contin i:      1.01 cm²/m²  AoV Dimensionless Index 0.56    Mitral Valve Measurements:     MV Vmax:      2.28 m/s     MR Vmax:          5.94 m/s  MV VTI, lefty   0.610 m      MR VTI:           167.00 cm  MV Mean Grad: 5.60 mmHg    MR Mean Gradient: 100.0 mmHg  MV Peak Grad: 20.8 mmHg    MR Peak Gradient: 141.1 mmHg  MV E Vmax:    1.8 m/s  MV A Vmax:    1.6 m/s  MV E/A:       1.1       Tricuspid Valve Measurements:     TR Vmax:          3.1 m/s  TR Peak Gradient: 37.5 mmHg  RA Pressure:      3 mmHg  PASP:             40 mmHg    ________________________________________________________________________________________  Electronically signed on 12/29/2023 at 1:45:41 PM by Horacio Calderon M.D.         *** Final ***    < end of copied text >

## 2023-12-30 NOTE — PROGRESS NOTE ADULT - SUBJECTIVE AND OBJECTIVE BOX
Cardiovascular Disease Progress Note  Date of service: 12-30-23 @ 08:54    Overnight events: No acute events overnight.  Patient is in no distress.   Otherwise review of systems negative    Objective Findings:  T(C): 36.7 (12-30-23 @ 04:30), Max: 37 (12-29-23 @ 10:53)  HR: 98 (12-30-23 @ 04:30) (84 - 105)  BP: 133/57 (12-30-23 @ 04:30) (111/68 - 133/57)  RR: 18 (12-30-23 @ 04:30) (18 - 18)  SpO2: 95% (12-30-23 @ 04:30) (95% - 99%)  Wt(kg): --  Daily     Daily       Physical Exam:  Gen: NAD; Patient resting comfortably  HEENT: EOMI, Normocephalic/ atraumatic  CV: RRR, normal S1 + S2, no m/r/g  Lungs:  Normal respiratory effort; clear to auscultation bilaterally  Abd: soft, non-tender; bowel sounds present  Ext: No edema; warm and well perfused    Telemetry:  Sinus     Laboratory Data:                        8.2    8.75  )-----------( 153      ( 30 Dec 2023 07:13 )             25.4     12-29    138  |  101  |  25<H>  ----------------------------<  106<H>  3.9   |  26  |  0.66    Ca    8.8      29 Dec 2023 13:19  Phos  3.9     12-28  Mg     2.0     12-28    TPro  5.5<L>  /  Alb  3.5  /  TBili  0.5  /  DBili  x   /  AST  18  /  ALT  16  /  AlkPhos  89  12-28    PT/INR - ( 28 Dec 2023 16:19 )   PT: 11.1 sec;   INR: 1.06 ratio         PTT - ( 28 Dec 2023 16:19 )  PTT:32.8 sec          Inpatient Medications:  MEDICATIONS  (STANDING):  aspirin enteric coated 81 milliGRAM(s) Oral daily  atorvastatin 20 milliGRAM(s) Oral at bedtime  chlorhexidine 2% Cloths 1 Application(s) Topical daily  diclofenac 75 milliGRAM(s) Oral daily  enoxaparin Injectable 40 milliGRAM(s) SubCutaneous every 24 hours  misoprostol 200 MICROGram(s) Oral every 6 hours  oxybutynin XL 10 milliGRAM(s) Oral daily  venlafaxine XR. 150 milliGRAM(s) Oral daily      Assessment: 83 y/o female PMHx HTN, diverticulitis c/b abscess requiring hemicolectomy further c/b empyema requiring thoracotomy  HLD, PSx hysterectomy, b/l shoulder replacement, b/l knee replacement now presenting to the ED via EMS s/p unwitnessed fall     Plan of Care:    #Fall   - Unwitnessed  - Rule out cardiogenic etiology  - EKG on admission shows sinus rhythm   - Orthostatics 12/30 are positive  - Echo 12/2023 shows a hyperdynamic LV with mild to moderate MS  - Hyperdynamic LV is usually seen with dehydration  - Recommend gentle hydration.   - Patients states she has had mitral stenosis for years and it is managed by her cardiologist Dr. Traube  - No indication for valvular intervention  - Patient may follow up as an outpatient with Dr. Traube upon discharge  - No further cardiac work up planned at this time.     #HLD  - Statin therapy      #Mitral stenosis  - Refer to plan above.   - Outpatient follow up .         #ACP (advance care planning)-  Advanced care planning was discussed with the patient.  Risks, benefits and alternatives of medical treatment and procedures were discussed in detail and all questions were answered. 30 additional minutes spent addressing advance care plans.          Over 25 minutes spent on total encounter; more than 50% of the visit was spent counseling and/or coordinating care by the attending physician.      Jan Garrison DO [facility]  Cardiovascular Disease  [phone] Cardiovascular Disease Progress Note  Date of service: 12-30-23 @ 08:54    Overnight events: No acute events overnight.  Patient is in no distress.   Otherwise review of systems negative    Objective Findings:  T(C): 36.7 (12-30-23 @ 04:30), Max: 37 (12-29-23 @ 10:53)  HR: 98 (12-30-23 @ 04:30) (84 - 105)  BP: 133/57 (12-30-23 @ 04:30) (111/68 - 133/57)  RR: 18 (12-30-23 @ 04:30) (18 - 18)  SpO2: 95% (12-30-23 @ 04:30) (95% - 99%)  Wt(kg): --  Daily     Daily       Physical Exam:  Gen: NAD; Patient resting comfortably  HEENT: EOMI, Normocephalic/ atraumatic  CV: RRR, normal S1 + S2, no m/r/g  Lungs:  Normal respiratory effort; clear to auscultation bilaterally  Abd: soft, non-tender; bowel sounds present  Ext: No edema; warm and well perfused    Telemetry:  Sinus     Laboratory Data:                        8.2    8.75  )-----------( 153      ( 30 Dec 2023 07:13 )             25.4     12-29    138  |  101  |  25<H>  ----------------------------<  106<H>  3.9   |  26  |  0.66    Ca    8.8      29 Dec 2023 13:19  Phos  3.9     12-28  Mg     2.0     12-28    TPro  5.5<L>  /  Alb  3.5  /  TBili  0.5  /  DBili  x   /  AST  18  /  ALT  16  /  AlkPhos  89  12-28    PT/INR - ( 28 Dec 2023 16:19 )   PT: 11.1 sec;   INR: 1.06 ratio         PTT - ( 28 Dec 2023 16:19 )  PTT:32.8 sec          Inpatient Medications:  MEDICATIONS  (STANDING):  aspirin enteric coated 81 milliGRAM(s) Oral daily  atorvastatin 20 milliGRAM(s) Oral at bedtime  chlorhexidine 2% Cloths 1 Application(s) Topical daily  diclofenac 75 milliGRAM(s) Oral daily  enoxaparin Injectable 40 milliGRAM(s) SubCutaneous every 24 hours  misoprostol 200 MICROGram(s) Oral every 6 hours  oxybutynin XL 10 milliGRAM(s) Oral daily  venlafaxine XR. 150 milliGRAM(s) Oral daily      Assessment: 81 y/o female PMHx HTN, diverticulitis c/b abscess requiring hemicolectomy further c/b empyema requiring thoracotomy  HLD, PSx hysterectomy, b/l shoulder replacement, b/l knee replacement now presenting to the ED via EMS s/p unwitnessed fall     Plan of Care:    #Fall   - Unwitnessed  - Rule out cardiogenic etiology  - EKG on admission shows sinus rhythm   - Orthostatics 12/30 are positive  - Echo 12/2023 shows a hyperdynamic LV with mild to moderate MS  - Hyperdynamic LV is usually seen with dehydration  - Recommend gentle hydration.   - Patients states she has had mitral stenosis for years and it is managed by her cardiologist Dr. Traube  - No indication for valvular intervention  - Patient may follow up as an outpatient with Dr. Traube upon discharge  - No further cardiac work up planned at this time.     #HLD  - Statin therapy      #Mitral stenosis  - Refer to plan above.   - Outpatient follow up .         #ACP (advance care planning)-  Advanced care planning was discussed with the patient.  Risks, benefits and alternatives of medical treatment and procedures were discussed in detail and all questions were answered. 30 additional minutes spent addressing advance care plans.          Over 25 minutes spent on total encounter; more than 50% of the visit was spent counseling and/or coordinating care by the attending physician.      Jan Garrison DO [facility]  Cardiovascular Disease  [phone] Cardiovascular Disease Progress Note  Date of service: 12-30-23 @ 08:54    Overnight events: No acute events overnight.  Patient is in no distress.   Otherwise review of systems negative    Objective Findings:  T(C): 36.7 (12-30-23 @ 04:30), Max: 37 (12-29-23 @ 10:53)  HR: 98 (12-30-23 @ 04:30) (84 - 105)  BP: 133/57 (12-30-23 @ 04:30) (111/68 - 133/57)  RR: 18 (12-30-23 @ 04:30) (18 - 18)  SpO2: 95% (12-30-23 @ 04:30) (95% - 99%)  Wt(kg): --  Daily     Daily       Physical Exam:  Gen: NAD; Patient resting comfortably  HEENT: EOMI, Normocephalic/ atraumatic  CV: RRR, normal S1 + S2, no m/r/g  Lungs:  Normal respiratory effort; clear to auscultation bilaterally  Abd: soft, non-tender; bowel sounds present  Ext: No edema; warm and well perfused    Telemetry:  Sinus     Laboratory Data:                        8.2    8.75  )-----------( 153      ( 30 Dec 2023 07:13 )             25.4     12-29    138  |  101  |  25<H>  ----------------------------<  106<H>  3.9   |  26  |  0.66    Ca    8.8      29 Dec 2023 13:19  Phos  3.9     12-28  Mg     2.0     12-28    TPro  5.5<L>  /  Alb  3.5  /  TBili  0.5  /  DBili  x   /  AST  18  /  ALT  16  /  AlkPhos  89  12-28    PT/INR - ( 28 Dec 2023 16:19 )   PT: 11.1 sec;   INR: 1.06 ratio         PTT - ( 28 Dec 2023 16:19 )  PTT:32.8 sec          Inpatient Medications:  MEDICATIONS  (STANDING):  aspirin enteric coated 81 milliGRAM(s) Oral daily  atorvastatin 20 milliGRAM(s) Oral at bedtime  chlorhexidine 2% Cloths 1 Application(s) Topical daily  diclofenac 75 milliGRAM(s) Oral daily  enoxaparin Injectable 40 milliGRAM(s) SubCutaneous every 24 hours  misoprostol 200 MICROGram(s) Oral every 6 hours  oxybutynin XL 10 milliGRAM(s) Oral daily  venlafaxine XR. 150 milliGRAM(s) Oral daily      Assessment: 83 y/o female PMHx HTN, diverticulitis c/b abscess requiring hemicolectomy further c/b empyema requiring thoracotomy  HLD, PSx hysterectomy, b/l shoulder replacement, b/l knee replacement now presenting to the ED via EMS s/p unwitnessed fall     Plan of Care:    #Fall   - Unwitnessed  - Rule out cardiogenic etiology  - EKG on admission shows sinus rhythm   - Orthostatics 12/30 are positive  - Echo 12/2023 shows a hyperdynamic LV with mild to moderate MS  - Hyperdynamic LV is usually seen with dehydration  - Recommend gentle hydration.   - Patients states she has had mitral stenosis for years and it is managed by her cardiologist Dr. Traube  - No indication for valvular intervention  - Patient may follow up as an outpatient with Dr. Traube upon discharge  - No further cardiac work up planned at this time.   - Recommend outpatient holter monitor.     #HLD  - Statin therapy      #Mitral stenosis  - Refer to plan above.   - Outpatient follow up .         #ACP (advance care planning)-  Advanced care planning was discussed with the patient.  Risks, benefits and alternatives of medical treatment and procedures were discussed in detail and all questions were answered. 30 additional minutes spent addressing advance care plans.          Over 25 minutes spent on total encounter; more than 50% of the visit was spent counseling and/or coordinating care by the attending physician.      Jan Garrison DO [facility]  Cardiovascular Disease  [phone] Cardiovascular Disease Progress Note  Date of service: 12-30-23 @ 08:54    Overnight events: No acute events overnight.  Patient is in no distress.   Otherwise review of systems negative    Objective Findings:  T(C): 36.7 (12-30-23 @ 04:30), Max: 37 (12-29-23 @ 10:53)  HR: 98 (12-30-23 @ 04:30) (84 - 105)  BP: 133/57 (12-30-23 @ 04:30) (111/68 - 133/57)  RR: 18 (12-30-23 @ 04:30) (18 - 18)  SpO2: 95% (12-30-23 @ 04:30) (95% - 99%)  Wt(kg): --  Daily     Daily       Physical Exam:  Gen: NAD; Patient resting comfortably  HEENT: EOMI, Normocephalic/ atraumatic  CV: RRR, normal S1 + S2, no m/r/g  Lungs:  Normal respiratory effort; clear to auscultation bilaterally  Abd: soft, non-tender; bowel sounds present  Ext: No edema; warm and well perfused    Telemetry:  Sinus     Laboratory Data:                        8.2    8.75  )-----------( 153      ( 30 Dec 2023 07:13 )             25.4     12-29    138  |  101  |  25<H>  ----------------------------<  106<H>  3.9   |  26  |  0.66    Ca    8.8      29 Dec 2023 13:19  Phos  3.9     12-28  Mg     2.0     12-28    TPro  5.5<L>  /  Alb  3.5  /  TBili  0.5  /  DBili  x   /  AST  18  /  ALT  16  /  AlkPhos  89  12-28    PT/INR - ( 28 Dec 2023 16:19 )   PT: 11.1 sec;   INR: 1.06 ratio         PTT - ( 28 Dec 2023 16:19 )  PTT:32.8 sec          Inpatient Medications:  MEDICATIONS  (STANDING):  aspirin enteric coated 81 milliGRAM(s) Oral daily  atorvastatin 20 milliGRAM(s) Oral at bedtime  chlorhexidine 2% Cloths 1 Application(s) Topical daily  diclofenac 75 milliGRAM(s) Oral daily  enoxaparin Injectable 40 milliGRAM(s) SubCutaneous every 24 hours  misoprostol 200 MICROGram(s) Oral every 6 hours  oxybutynin XL 10 milliGRAM(s) Oral daily  venlafaxine XR. 150 milliGRAM(s) Oral daily      Assessment: 83 y/o female PMHx HTN, diverticulitis c/b abscess requiring hemicolectomy further c/b empyema requiring thoracotomy  HLD, PSx hysterectomy, b/l shoulder replacement, b/l knee replacement now presenting to the ED via EMS s/p unwitnessed fall     Plan of Care:    #Fall   - Unwitnessed  - Rule out cardiogenic etiology  - EKG on admission shows sinus rhythm   - Orthostatics 12/30 are positive  - Echo 12/2023 shows a hyperdynamic LV with mild to moderate MS  - Hyperdynamic LV is usually seen with dehydration  - Recommend gentle hydration.   - Patients states she has had mitral stenosis for years and it is managed by her cardiologist Dr. Traube  - No indication for valvular intervention  - Patient may follow up as an outpatient with Dr. Traube upon discharge  - No further cardiac work up planned at this time.   - Recommend outpatient holter monitor.     #HLD  - Statin therapy      #Mitral stenosis  - Refer to plan above.   - Outpatient follow up .         #ACP (advance care planning)-  Advanced care planning was discussed with the patient.  Risks, benefits and alternatives of medical treatment and procedures were discussed in detail and all questions were answered. 30 additional minutes spent addressing advance care plans.          Over 25 minutes spent on total encounter; more than 50% of the visit was spent counseling and/or coordinating care by the attending physician.      aJn Garrison DO [facility]  Cardiovascular Disease  [phone]

## 2023-12-30 NOTE — DISCHARGE NOTE PROVIDER - NPI NUMBER (FOR SYSADMIN USE ONLY) :
[[id number]],[[id number]] [[id number]],[[id number]] [[id number]],[[id number]] [[id number]],[[id number]]

## 2024-01-01 ENCOUNTER — INPATIENT (INPATIENT)
Facility: HOSPITAL | Age: 84
LOS: 2 days | Discharge: HOME | DRG: 309 | End: 2024-01-04
Attending: GENERAL ACUTE CARE HOSPITAL | Admitting: INTERNAL MEDICINE
Payer: MEDICARE

## 2024-01-01 ENCOUNTER — TRANSCRIPTION ENCOUNTER (OUTPATIENT)
Age: 84
End: 2024-01-01

## 2024-01-01 VITALS
HEART RATE: 135 BPM | HEIGHT: 60 IN | RESPIRATION RATE: 18 BRPM | TEMPERATURE: 98 F | SYSTOLIC BLOOD PRESSURE: 97 MMHG | DIASTOLIC BLOOD PRESSURE: 61 MMHG | WEIGHT: 179.9 LBS | OXYGEN SATURATION: 99 %

## 2024-01-01 DIAGNOSIS — Z90.710 ACQUIRED ABSENCE OF BOTH CERVIX AND UTERUS: Chronic | ICD-10-CM

## 2024-01-01 DIAGNOSIS — Z96.619 PRESENCE OF UNSPECIFIED ARTIFICIAL SHOULDER JOINT: Chronic | ICD-10-CM

## 2024-01-01 DIAGNOSIS — Z96.659 PRESENCE OF UNSPECIFIED ARTIFICIAL KNEE JOINT: Chronic | ICD-10-CM

## 2024-01-01 LAB
ALBUMIN SERPL ELPH-MCNC: 3.8 G/DL — SIGNIFICANT CHANGE UP (ref 3.3–5)
ALBUMIN SERPL ELPH-MCNC: 3.8 G/DL — SIGNIFICANT CHANGE UP (ref 3.3–5)
ALP SERPL-CCNC: 95 U/L — SIGNIFICANT CHANGE UP (ref 40–120)
ALP SERPL-CCNC: 95 U/L — SIGNIFICANT CHANGE UP (ref 40–120)
ALT FLD-CCNC: 19 U/L — SIGNIFICANT CHANGE UP (ref 10–45)
ALT FLD-CCNC: 19 U/L — SIGNIFICANT CHANGE UP (ref 10–45)
ANION GAP SERPL CALC-SCNC: 13 MMOL/L — SIGNIFICANT CHANGE UP (ref 5–17)
ANION GAP SERPL CALC-SCNC: 13 MMOL/L — SIGNIFICANT CHANGE UP (ref 5–17)
APPEARANCE UR: CLEAR — SIGNIFICANT CHANGE UP
APPEARANCE UR: CLEAR — SIGNIFICANT CHANGE UP
AST SERPL-CCNC: 27 U/L — SIGNIFICANT CHANGE UP (ref 10–40)
AST SERPL-CCNC: 27 U/L — SIGNIFICANT CHANGE UP (ref 10–40)
BASOPHILS # BLD AUTO: 0.03 K/UL — SIGNIFICANT CHANGE UP (ref 0–0.2)
BASOPHILS # BLD AUTO: 0.03 K/UL — SIGNIFICANT CHANGE UP (ref 0–0.2)
BASOPHILS # BLD AUTO: 0.06 K/UL — SIGNIFICANT CHANGE UP (ref 0–0.2)
BASOPHILS # BLD AUTO: 0.06 K/UL — SIGNIFICANT CHANGE UP (ref 0–0.2)
BASOPHILS NFR BLD AUTO: 0.4 % — SIGNIFICANT CHANGE UP (ref 0–2)
BASOPHILS NFR BLD AUTO: 0.4 % — SIGNIFICANT CHANGE UP (ref 0–2)
BASOPHILS NFR BLD AUTO: 0.7 % — SIGNIFICANT CHANGE UP (ref 0–2)
BASOPHILS NFR BLD AUTO: 0.7 % — SIGNIFICANT CHANGE UP (ref 0–2)
BILIRUB SERPL-MCNC: 1.6 MG/DL — HIGH (ref 0.2–1.2)
BILIRUB SERPL-MCNC: 1.6 MG/DL — HIGH (ref 0.2–1.2)
BILIRUB UR-MCNC: NEGATIVE — SIGNIFICANT CHANGE UP
BILIRUB UR-MCNC: NEGATIVE — SIGNIFICANT CHANGE UP
BUN SERPL-MCNC: 19 MG/DL — SIGNIFICANT CHANGE UP (ref 7–23)
BUN SERPL-MCNC: 19 MG/DL — SIGNIFICANT CHANGE UP (ref 7–23)
CALCIUM SERPL-MCNC: 9.2 MG/DL — SIGNIFICANT CHANGE UP (ref 8.4–10.5)
CALCIUM SERPL-MCNC: 9.2 MG/DL — SIGNIFICANT CHANGE UP (ref 8.4–10.5)
CHLORIDE SERPL-SCNC: 104 MMOL/L — SIGNIFICANT CHANGE UP (ref 96–108)
CHLORIDE SERPL-SCNC: 104 MMOL/L — SIGNIFICANT CHANGE UP (ref 96–108)
CO2 SERPL-SCNC: 23 MMOL/L — SIGNIFICANT CHANGE UP (ref 22–31)
CO2 SERPL-SCNC: 23 MMOL/L — SIGNIFICANT CHANGE UP (ref 22–31)
COLOR SPEC: YELLOW — SIGNIFICANT CHANGE UP
COLOR SPEC: YELLOW — SIGNIFICANT CHANGE UP
CREAT SERPL-MCNC: 0.69 MG/DL — SIGNIFICANT CHANGE UP (ref 0.5–1.3)
CREAT SERPL-MCNC: 0.69 MG/DL — SIGNIFICANT CHANGE UP (ref 0.5–1.3)
DIFF PNL FLD: NEGATIVE — SIGNIFICANT CHANGE UP
DIFF PNL FLD: NEGATIVE — SIGNIFICANT CHANGE UP
EGFR: 86 ML/MIN/1.73M2 — SIGNIFICANT CHANGE UP
EGFR: 86 ML/MIN/1.73M2 — SIGNIFICANT CHANGE UP
EOSINOPHIL # BLD AUTO: 0.12 K/UL — SIGNIFICANT CHANGE UP (ref 0–0.5)
EOSINOPHIL # BLD AUTO: 0.12 K/UL — SIGNIFICANT CHANGE UP (ref 0–0.5)
EOSINOPHIL # BLD AUTO: 0.14 K/UL — SIGNIFICANT CHANGE UP (ref 0–0.5)
EOSINOPHIL # BLD AUTO: 0.14 K/UL — SIGNIFICANT CHANGE UP (ref 0–0.5)
EOSINOPHIL NFR BLD AUTO: 1.5 % — SIGNIFICANT CHANGE UP (ref 0–6)
EOSINOPHIL NFR BLD AUTO: 1.5 % — SIGNIFICANT CHANGE UP (ref 0–6)
EOSINOPHIL NFR BLD AUTO: 1.6 % — SIGNIFICANT CHANGE UP (ref 0–6)
EOSINOPHIL NFR BLD AUTO: 1.6 % — SIGNIFICANT CHANGE UP (ref 0–6)
FLUAV AG NPH QL: SIGNIFICANT CHANGE UP
FLUAV AG NPH QL: SIGNIFICANT CHANGE UP
FLUBV AG NPH QL: SIGNIFICANT CHANGE UP
FLUBV AG NPH QL: SIGNIFICANT CHANGE UP
GLUCOSE SERPL-MCNC: 114 MG/DL — HIGH (ref 70–99)
GLUCOSE SERPL-MCNC: 114 MG/DL — HIGH (ref 70–99)
GLUCOSE UR QL: NEGATIVE MG/DL — SIGNIFICANT CHANGE UP
GLUCOSE UR QL: NEGATIVE MG/DL — SIGNIFICANT CHANGE UP
HCT VFR BLD CALC: 24.1 % — LOW (ref 34.5–45)
HCT VFR BLD CALC: 24.1 % — LOW (ref 34.5–45)
HCT VFR BLD CALC: 26.6 % — LOW (ref 34.5–45)
HCT VFR BLD CALC: 26.6 % — LOW (ref 34.5–45)
HGB BLD-MCNC: 7.8 G/DL — LOW (ref 11.5–15.5)
HGB BLD-MCNC: 7.8 G/DL — LOW (ref 11.5–15.5)
HGB BLD-MCNC: 8.5 G/DL — LOW (ref 11.5–15.5)
HGB BLD-MCNC: 8.5 G/DL — LOW (ref 11.5–15.5)
IMM GRANULOCYTES NFR BLD AUTO: 1.1 % — HIGH (ref 0–0.9)
IMM GRANULOCYTES NFR BLD AUTO: 1.1 % — HIGH (ref 0–0.9)
IMM GRANULOCYTES NFR BLD AUTO: 1.2 % — HIGH (ref 0–0.9)
IMM GRANULOCYTES NFR BLD AUTO: 1.2 % — HIGH (ref 0–0.9)
KETONES UR-MCNC: NEGATIVE MG/DL — SIGNIFICANT CHANGE UP
KETONES UR-MCNC: NEGATIVE MG/DL — SIGNIFICANT CHANGE UP
LEUKOCYTE ESTERASE UR-ACNC: NEGATIVE — SIGNIFICANT CHANGE UP
LEUKOCYTE ESTERASE UR-ACNC: NEGATIVE — SIGNIFICANT CHANGE UP
LYMPHOCYTES # BLD AUTO: 1.3 K/UL — SIGNIFICANT CHANGE UP (ref 1–3.3)
LYMPHOCYTES # BLD AUTO: 1.3 K/UL — SIGNIFICANT CHANGE UP (ref 1–3.3)
LYMPHOCYTES # BLD AUTO: 1.48 K/UL — SIGNIFICANT CHANGE UP (ref 1–3.3)
LYMPHOCYTES # BLD AUTO: 1.48 K/UL — SIGNIFICANT CHANGE UP (ref 1–3.3)
LYMPHOCYTES # BLD AUTO: 14.3 % — SIGNIFICANT CHANGE UP (ref 13–44)
LYMPHOCYTES # BLD AUTO: 14.3 % — SIGNIFICANT CHANGE UP (ref 13–44)
LYMPHOCYTES # BLD AUTO: 20.3 % — SIGNIFICANT CHANGE UP (ref 13–44)
LYMPHOCYTES # BLD AUTO: 20.3 % — SIGNIFICANT CHANGE UP (ref 13–44)
MAGNESIUM SERPL-MCNC: 1.9 MG/DL — SIGNIFICANT CHANGE UP (ref 1.6–2.6)
MAGNESIUM SERPL-MCNC: 1.9 MG/DL — SIGNIFICANT CHANGE UP (ref 1.6–2.6)
MCHC RBC-ENTMCNC: 28.7 PG — SIGNIFICANT CHANGE UP (ref 27–34)
MCHC RBC-ENTMCNC: 28.7 PG — SIGNIFICANT CHANGE UP (ref 27–34)
MCHC RBC-ENTMCNC: 28.9 PG — SIGNIFICANT CHANGE UP (ref 27–34)
MCHC RBC-ENTMCNC: 28.9 PG — SIGNIFICANT CHANGE UP (ref 27–34)
MCHC RBC-ENTMCNC: 32 GM/DL — SIGNIFICANT CHANGE UP (ref 32–36)
MCHC RBC-ENTMCNC: 32 GM/DL — SIGNIFICANT CHANGE UP (ref 32–36)
MCHC RBC-ENTMCNC: 32.4 GM/DL — SIGNIFICANT CHANGE UP (ref 32–36)
MCHC RBC-ENTMCNC: 32.4 GM/DL — SIGNIFICANT CHANGE UP (ref 32–36)
MCV RBC AUTO: 88.6 FL — SIGNIFICANT CHANGE UP (ref 80–100)
MCV RBC AUTO: 88.6 FL — SIGNIFICANT CHANGE UP (ref 80–100)
MCV RBC AUTO: 90.5 FL — SIGNIFICANT CHANGE UP (ref 80–100)
MCV RBC AUTO: 90.5 FL — SIGNIFICANT CHANGE UP (ref 80–100)
MONOCYTES # BLD AUTO: 0.48 K/UL — SIGNIFICANT CHANGE UP (ref 0–0.9)
MONOCYTES # BLD AUTO: 0.48 K/UL — SIGNIFICANT CHANGE UP (ref 0–0.9)
MONOCYTES # BLD AUTO: 0.61 K/UL — SIGNIFICANT CHANGE UP (ref 0–0.9)
MONOCYTES # BLD AUTO: 0.61 K/UL — SIGNIFICANT CHANGE UP (ref 0–0.9)
MONOCYTES NFR BLD AUTO: 6.6 % — SIGNIFICANT CHANGE UP (ref 2–14)
MONOCYTES NFR BLD AUTO: 6.6 % — SIGNIFICANT CHANGE UP (ref 2–14)
MONOCYTES NFR BLD AUTO: 6.7 % — SIGNIFICANT CHANGE UP (ref 2–14)
MONOCYTES NFR BLD AUTO: 6.7 % — SIGNIFICANT CHANGE UP (ref 2–14)
NEUTROPHILS # BLD AUTO: 5.08 K/UL — SIGNIFICANT CHANGE UP (ref 1.8–7.4)
NEUTROPHILS # BLD AUTO: 5.08 K/UL — SIGNIFICANT CHANGE UP (ref 1.8–7.4)
NEUTROPHILS # BLD AUTO: 6.91 K/UL — SIGNIFICANT CHANGE UP (ref 1.8–7.4)
NEUTROPHILS # BLD AUTO: 6.91 K/UL — SIGNIFICANT CHANGE UP (ref 1.8–7.4)
NEUTROPHILS NFR BLD AUTO: 69.9 % — SIGNIFICANT CHANGE UP (ref 43–77)
NEUTROPHILS NFR BLD AUTO: 69.9 % — SIGNIFICANT CHANGE UP (ref 43–77)
NEUTROPHILS NFR BLD AUTO: 75.7 % — SIGNIFICANT CHANGE UP (ref 43–77)
NEUTROPHILS NFR BLD AUTO: 75.7 % — SIGNIFICANT CHANGE UP (ref 43–77)
NITRITE UR-MCNC: NEGATIVE — SIGNIFICANT CHANGE UP
NITRITE UR-MCNC: NEGATIVE — SIGNIFICANT CHANGE UP
NRBC # BLD: 0 /100 WBCS — SIGNIFICANT CHANGE UP (ref 0–0)
PH UR: 7.5 — SIGNIFICANT CHANGE UP (ref 5–8)
PH UR: 7.5 — SIGNIFICANT CHANGE UP (ref 5–8)
PHOSPHATE SERPL-MCNC: 3.1 MG/DL — SIGNIFICANT CHANGE UP (ref 2.5–4.5)
PHOSPHATE SERPL-MCNC: 3.1 MG/DL — SIGNIFICANT CHANGE UP (ref 2.5–4.5)
PLATELET # BLD AUTO: 210 K/UL — SIGNIFICANT CHANGE UP (ref 150–400)
PLATELET # BLD AUTO: 210 K/UL — SIGNIFICANT CHANGE UP (ref 150–400)
PLATELET # BLD AUTO: 230 K/UL — SIGNIFICANT CHANGE UP (ref 150–400)
PLATELET # BLD AUTO: 230 K/UL — SIGNIFICANT CHANGE UP (ref 150–400)
POTASSIUM SERPL-MCNC: 4 MMOL/L — SIGNIFICANT CHANGE UP (ref 3.5–5.3)
POTASSIUM SERPL-MCNC: 4 MMOL/L — SIGNIFICANT CHANGE UP (ref 3.5–5.3)
POTASSIUM SERPL-SCNC: 4 MMOL/L — SIGNIFICANT CHANGE UP (ref 3.5–5.3)
POTASSIUM SERPL-SCNC: 4 MMOL/L — SIGNIFICANT CHANGE UP (ref 3.5–5.3)
PROT SERPL-MCNC: 5.9 G/DL — LOW (ref 6–8.3)
PROT SERPL-MCNC: 5.9 G/DL — LOW (ref 6–8.3)
PROT UR-MCNC: NEGATIVE MG/DL — SIGNIFICANT CHANGE UP
PROT UR-MCNC: NEGATIVE MG/DL — SIGNIFICANT CHANGE UP
RBC # BLD: 2.72 M/UL — LOW (ref 3.8–5.2)
RBC # BLD: 2.72 M/UL — LOW (ref 3.8–5.2)
RBC # BLD: 2.94 M/UL — LOW (ref 3.8–5.2)
RBC # BLD: 2.94 M/UL — LOW (ref 3.8–5.2)
RBC # FLD: 14.6 % — HIGH (ref 10.3–14.5)
RBC # FLD: 14.6 % — HIGH (ref 10.3–14.5)
RBC # FLD: 14.8 % — HIGH (ref 10.3–14.5)
RBC # FLD: 14.8 % — HIGH (ref 10.3–14.5)
RSV RNA NPH QL NAA+NON-PROBE: SIGNIFICANT CHANGE UP
RSV RNA NPH QL NAA+NON-PROBE: SIGNIFICANT CHANGE UP
SARS-COV-2 RNA SPEC QL NAA+PROBE: SIGNIFICANT CHANGE UP
SARS-COV-2 RNA SPEC QL NAA+PROBE: SIGNIFICANT CHANGE UP
SODIUM SERPL-SCNC: 140 MMOL/L — SIGNIFICANT CHANGE UP (ref 135–145)
SODIUM SERPL-SCNC: 140 MMOL/L — SIGNIFICANT CHANGE UP (ref 135–145)
SP GR SPEC: 1.01 — SIGNIFICANT CHANGE UP (ref 1–1.03)
SP GR SPEC: 1.01 — SIGNIFICANT CHANGE UP (ref 1–1.03)
TROPONIN T, HIGH SENSITIVITY RESULT: 30 NG/L — SIGNIFICANT CHANGE UP (ref 0–51)
TROPONIN T, HIGH SENSITIVITY RESULT: 30 NG/L — SIGNIFICANT CHANGE UP (ref 0–51)
TROPONIN T, HIGH SENSITIVITY RESULT: 31 NG/L — SIGNIFICANT CHANGE UP (ref 0–51)
TROPONIN T, HIGH SENSITIVITY RESULT: 31 NG/L — SIGNIFICANT CHANGE UP (ref 0–51)
UROBILINOGEN FLD QL: 2 MG/DL (ref 0.2–1)
UROBILINOGEN FLD QL: 2 MG/DL (ref 0.2–1)
WBC # BLD: 7.28 K/UL — SIGNIFICANT CHANGE UP (ref 3.8–10.5)
WBC # BLD: 7.28 K/UL — SIGNIFICANT CHANGE UP (ref 3.8–10.5)
WBC # BLD: 9.12 K/UL — SIGNIFICANT CHANGE UP (ref 3.8–10.5)
WBC # BLD: 9.12 K/UL — SIGNIFICANT CHANGE UP (ref 3.8–10.5)
WBC # FLD AUTO: 7.28 K/UL — SIGNIFICANT CHANGE UP (ref 3.8–10.5)
WBC # FLD AUTO: 7.28 K/UL — SIGNIFICANT CHANGE UP (ref 3.8–10.5)
WBC # FLD AUTO: 9.12 K/UL — SIGNIFICANT CHANGE UP (ref 3.8–10.5)
WBC # FLD AUTO: 9.12 K/UL — SIGNIFICANT CHANGE UP (ref 3.8–10.5)

## 2024-01-01 PROCEDURE — 73701 CT LOWER EXTREMITY W/DYE: CPT | Mod: 26,RT,MA

## 2024-01-01 PROCEDURE — 71045 X-RAY EXAM CHEST 1 VIEW: CPT | Mod: 26

## 2024-01-01 PROCEDURE — 71260 CT THORAX DX C+: CPT | Mod: 26,MA

## 2024-01-01 PROCEDURE — 70450 CT HEAD/BRAIN W/O DYE: CPT | Mod: 26,MA

## 2024-01-01 PROCEDURE — 74177 CT ABD & PELVIS W/CONTRAST: CPT | Mod: 26,MA

## 2024-01-01 PROCEDURE — 99285 EMERGENCY DEPT VISIT HI MDM: CPT | Mod: GC

## 2024-01-01 RX ORDER — SODIUM CHLORIDE 9 MG/ML
500 INJECTION INTRAMUSCULAR; INTRAVENOUS; SUBCUTANEOUS ONCE
Refills: 0 | Status: COMPLETED | OUTPATIENT
Start: 2024-01-01 | End: 2024-01-01

## 2024-01-01 RX ORDER — SODIUM CHLORIDE 9 MG/ML
1000 INJECTION INTRAMUSCULAR; INTRAVENOUS; SUBCUTANEOUS ONCE
Refills: 0 | Status: COMPLETED | OUTPATIENT
Start: 2024-01-01 | End: 2024-01-01

## 2024-01-01 RX ADMIN — SODIUM CHLORIDE 1000 MILLILITER(S): 9 INJECTION INTRAMUSCULAR; INTRAVENOUS; SUBCUTANEOUS at 17:56

## 2024-01-01 RX ADMIN — SODIUM CHLORIDE 500 MILLILITER(S): 9 INJECTION INTRAMUSCULAR; INTRAVENOUS; SUBCUTANEOUS at 23:33

## 2024-01-01 NOTE — ED ADULT NURSE NOTE - OBJECTIVE STATEMENT
Pt is a 82 y/o female pmhx of HTN on ASA presents to the ED BIBA from home c/o lethargy. Pt was discharged Saturday after being admitted for syncope. EMS found pt to be in afibb to 130's, no history of afibb. Pt presents alert & oriented x 4, calm, able to follow commands, speech clear. Breathing spontaneous & nonlabored. On cardiac monitor, afibb 130's. Abdomen soft & nondistended. Extensive ecchymosis noted to RLE, some ecchymosis noted to RUE & R side of head. Strong peripheral pulses noted b/l, no edema noted. Skin warm, clean, dry & intact. Denies chest pain, SOB, abdominal pain, back pain, n/v/d, fever, chills, changes in vision. Call bell within reach and pt instructed on how to use, bed in lowest position, side rails up, wheels locked. Pt is a 84 y/o female pmhx of HTN on ASA presents to the ED BIBA from home c/o lethargy. Pt was discharged Saturday after being admitted for syncope. EMS found pt to be in afibb to 130's, no history of afibb. Pt presents alert & oriented x 4, calm, able to follow commands, speech clear. Breathing spontaneous & nonlabored. On cardiac monitor, afibb 130's. Abdomen soft & nondistended. Extensive ecchymosis noted to RLE, some ecchymosis noted to RUE & R side of head. Strong peripheral pulses noted b/l, no edema noted. Skin warm, clean, dry & intact. Denies chest pain, SOB, abdominal pain, back pain, n/v/d, fever, chills, changes in vision. Call bell within reach and pt instructed on how to use, bed in lowest position, side rails up, wheels locked.

## 2024-01-01 NOTE — ED PROVIDER NOTE - OBJECTIVE STATEMENT
83-year-old female past medical history HTN, GERD, presents today via EMS for episode of diaphoresis and generalized weakness today.  Patient recently admitted with extensive syncopal workup including echocardiogram, ekg, carotid Doppler, lower extremity duplex, and pan scan for recent fall on Thursday (12/28/2023).  Patient reports that she felt similar episode of generalized weakness/dizziness yesterday while feeding her cats when she fell on balance fell back on her buttocks.  No head strike or LOC.  Daughter at bedside noted that she helped patient get up and noted normal ambulation s/p fall.  Daughter noted that patient has now had a new onset arrhythmia and was tachycardic with EMS.  Patient denies headaches, dizziness, nausea, vomiting fever, chills, chest pain, SOB, abdominal pain, hematuria, dysuria, change in bowel movements. 83-year-old female past medical history HTN, GERD, divertic c/b abscess requiring hemicolectomy further c/b empyema requiring thoracotomy  HLD, PSx hysterectomy, b/l shoulder replacement, b/l knee replacement presents today via EMS for episode of diaphoresis and generalized weakness today.  Patient recently admitted with extensive syncopal workup including echocardiogram, ekg, carotid Doppler, lower extremity duplex, and pan scan for recent fall on Thursday (12/28/2023).  Patient reports that she felt similar episode of generalized weakness/dizziness yesterday while feeding her cats when she fell on balance fell back on her buttocks.  No head strike or LOC.  Daughter at bedside noted that she helped patient get up and noted normal ambulation s/p fall.  Daughter noted that patient has now had a new onset arrhythmia and was tachycardic with EMS.  Patient denies headaches, dizziness, nausea, vomiting fever, chills, chest pain, SOB, abdominal pain, hematuria, dysuria, change in bowel movements. 83-year-old female past medical history HTN, GERD, divertic c/b abscess requiring hemicolectomy further c/b empyema requiring thoracotomy  HLD, PSx hysterectomy, b/l shoulder replacement, b/l knee replacement presents today via EMS for episode of diaphoresis and generalized weakness today.  Patient recently admitted with extensive syncopal workup including echocardiogram, ekg, carotid Doppler, lower extremity duplex, and pan scan for recent fall on Thursday (12/28/2023).  Patient reports that she felt similar episode of generalized weakness/dizziness yesterday while feeding her cats when she felt off balance falling back on her buttocks.  No head strike or LOC.  Daughter at bedside noted that she helped patient get up and noted normal ambulation s/p fall.  Daughter noted that patient has now had a new onset arrhythmia and was tachycardic with EMS.  Patient denies headaches, dizziness, nausea, vomiting fever, chills, chest pain, SOB, abdominal pain, hematuria, dysuria, change in bowel movements.

## 2024-01-01 NOTE — ED PROVIDER NOTE - ATTENDING CONTRIBUTION TO CARE
I, Rodger Mckeon, have performed a history and physical exam on this patient, and discussed their management with the resident. I have fully participated in the care of this patient. I agree with the history, physical exam, and plan as documented by the resident, unless reported as otherwise below:    81 yo F PMHx HTN, diverticulitis c/b abscess, hemicolectomy, empyema requiring thoracotomy  HLD, PSx hysterectomy, b/l shoulder replacement, b/l knee replacement, Recently admitted for fall at home, workup for head injury right upper and lower extremity injuries, and syncope largely unremarkable.  Now presenting to ER again after discharge 2 days ago following a mechanical fall without head strike or loss of consciousness with complaint of increased generalized weakness and lightheadedness.  With EMS patient found to be with no arrhythmia, A-fib versus a flutter.  In ED patient resting comfortably in bed.  Denies chest pain or shortness of breath.  Denies new focal neurological deficits.  Vital signs stable with exception of tachycardia, EKG demonstrating A-fib with RVR.  This is new for patient.  Denies abdominal pain.  Clear to auscultation bilaterally.  Abdomen soft nontender to palpation.  Extensive ecchymosis to the right thigh which daughter at bedside states is increased from recent admission.  Patient not on anticoagulation or antiplatelet.  Distal pulses intact throughout.  Sensation and strength intact throughout.  No midline tenderness to spine throughout.  No significant changes in urination or bowel movements.  No vomiting.  Will obtain screening trauma imaging.  Will obtain cardiac biomarkers, screening lab work, coags, provide small bolus IV fluids.  Will reassess following initial ED workup for further diagnostics and interventions, patient will ultimately require admission given new onset A-fib and lightheadedness.  Patient and daughter agreeable plan.     now presenting to the ED via EMS s/p unwitnessed fall today now c/o RUE pain.     Please refer to progress notes/disposition for additional decision making in patient's care. I, Rodger Mckeon, have performed a history and physical exam on this patient, and discussed their management with the resident. I have fully participated in the care of this patient. I agree with the history, physical exam, and plan as documented by the resident, unless reported as otherwise below:    83 yo F PMHx HTN, diverticulitis c/b abscess, hemicolectomy, empyema requiring thoracotomy  HLD, PSx hysterectomy, b/l shoulder replacement, b/l knee replacement, Recently admitted for fall at home, workup for head injury right upper and lower extremity injuries, and syncope largely unremarkable.  Now presenting to ER again after discharge 2 days ago following a mechanical fall without head strike or loss of consciousness with complaint of increased generalized weakness and lightheadedness.  With EMS patient found to be with no arrhythmia, A-fib versus a flutter.  In ED patient resting comfortably in bed.  Denies chest pain or shortness of breath.  Denies new focal neurological deficits.  Vital signs stable with exception of tachycardia, EKG demonstrating A-fib with RVR.  This is new for patient.  Denies abdominal pain.  Clear to auscultation bilaterally.  Abdomen soft nontender to palpation.  Extensive ecchymosis to the right thigh which daughter at bedside states is increased from recent admission.  Patient not on anticoagulation or antiplatelet.  Distal pulses intact throughout.  Sensation and strength intact throughout.  No midline tenderness to spine throughout.  No significant changes in urination or bowel movements.  No vomiting.  Will obtain screening trauma imaging.  Will obtain cardiac biomarkers, screening lab work, coags, provide small bolus IV fluids.  Will reassess following initial ED workup for further diagnostics and interventions, patient will ultimately require admission given new onset A-fib and lightheadedness.  Patient and daughter agreeable plan.     now presenting to the ED via EMS s/p unwitnessed fall today now c/o RUE pain.     Please refer to progress notes/disposition for additional decision making in patient's care.

## 2024-01-01 NOTE — ED ADULT NURSE NOTE - NSFALLUNIVINTERV_ED_ALL_ED
Bed/Stretcher in lowest position, wheels locked, appropriate side rails in place/Call bell, personal items and telephone in reach/Instruct patient to call for assistance before getting out of bed/chair/stretcher/Non-slip footwear applied when patient is off stretcher/[place] to call system/Physically safe environment - no spills, clutter or unnecessary equipment/Purposeful proactive rounding/Room/bathroom lighting operational, light cord in reach Bed/Stretcher in lowest position, wheels locked, appropriate side rails in place/Call bell, personal items and telephone in reach/Instruct patient to call for assistance before getting out of bed/chair/stretcher/Non-slip footwear applied when patient is off stretcher/[place] to call system/Physically safe environment - no spills, clutter or unnecessary equipment/Purposeful proactive rounding/Room/bathroom lighting operational, light cord in reach

## 2024-01-01 NOTE — ED PROVIDER NOTE - CLINICAL SUMMARY MEDICAL DECISION MAKING FREE TEXT BOX
83-year-old female past medical history HTN, GERD, presents today via EMS for episode of diaphoresis and generalized weakness today.  Patient recently admitted with extensive syncopal workup including echocardiogram, ekg, carotid Doppler, lower extremity duplex, and pan scan for recent fall on Thursday (12/28/2023).  Patient reports that she felt similar episode of generalized weakness/dizziness yesterday while feeding her cats when she fell on balance fell back on her buttocks.  No head strike or LOC.  Daughter at bedside noted that she helped patient get up and noted normal ambulation s/p fall.  Daughter noted that patient has now had a new onset arrhythmia and was tachycardic with EMS.  Patient denies headaches, dizziness, nausea, vomiting fever, chills, chest pain, SOB, abdominal pain, hematuria, dysuria, change in bowel movements.  Given recent admission extensive workup with new onset generalized weakness with associated diaphoresis will order EKG, Trop, CXR to assess for new onset, TSH, basic labs for new onset A-fib with RVR.  Normal strength bilaterally with no pelvic instability, imaging not warranted at this time. Given fluids, will reassess 83-year-old female past medical history HTN, GERD, divertic c/b abscess requiring hemicolectomy further c/b empyema requiring thoracotomy  HLD, PSx hysterectomy, b/l shoulder replacement, b/l knee replacement presents today via EMS for episode of diaphoresis and generalized weakness today.  Patient recently admitted with extensive syncopal workup including echocardiogram, ekg, carotid Doppler, lower extremity duplex, and pan scan for recent fall on Thursday (12/28/2023).  Patient reports that she felt similar episode of generalized weakness/dizziness yesterday while feeding her cats when she fell on balance fell back on her buttocks.  No head strike or LOC.  Daughter at bedside noted that she helped patient get up and noted normal ambulation s/p fall.  Daughter noted that patient has now had a new onset arrhythmia and was tachycardic with EMS.  Patient denies headaches, dizziness, nausea, vomiting fever, chills, chest pain, SOB, abdominal pain, hematuria, dysuria, change in bowel movements.  Given recent admission extensive workup with new onset generalized weakness with associated diaphoresis will order EKG, Trop, CXR to assess for new onset, TSH, basic labs for new onset A-fib with RVR.  Normal strength bilaterally with no pelvic instability, imaging not warranted at this time. Given fluids, will reassess

## 2024-01-01 NOTE — ED PROVIDER NOTE - PROGRESS NOTE DETAILS
Barbie Shirley MD (PGY-2 EM): discussed need for admission for new onset Afib. discussed w patient and cardiologist risk and benefits of anticoagulation, will not anticoag at this time. no acute surgical intervention at this time for hematoma.

## 2024-01-01 NOTE — ED PROVIDER NOTE - PHYSICAL EXAMINATION
Gen: NAD, non-toxic appearing  Head: normal appearing  HEENT: normal conjunctiva, oral mucosa moist  Lung: no respiratory distress, speaking in full sentences, CTA b/l     CV: regular rate and rhythm, no murmurs  Abd: soft, non distended, non tender   MSK: bluish ecchymosis consistent with findings from prior fall on thur. 5/5 Strength. Neurovascularly intact, Normal sensation b/l, no pelvic instability   Neuro: No focal deficits, AAOx3  Skin: Warm  Psych: normal affect

## 2024-01-01 NOTE — ED ADULT TRIAGE NOTE - IDEAL BODY WEIGHT(KG)
Patient called the on-call nurse before office hours, and was advised to call our office to provide an update.   46

## 2024-01-02 DIAGNOSIS — Z29.9 ENCOUNTER FOR PROPHYLACTIC MEASURES, UNSPECIFIED: ICD-10-CM

## 2024-01-02 DIAGNOSIS — I48.0 PAROXYSMAL ATRIAL FIBRILLATION: ICD-10-CM

## 2024-01-02 DIAGNOSIS — I48.91 UNSPECIFIED ATRIAL FIBRILLATION: ICD-10-CM
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
## 2024-01-02 DIAGNOSIS — T14.8XXA OTHER INJURY OF UNSPECIFIED BODY REGION, INITIAL ENCOUNTER: ICD-10-CM

## 2024-01-02 PROBLEM — Z86.79 PERSONAL HISTORY OF OTHER DISEASES OF THE CIRCULATORY SYSTEM: Chronic | Status: ACTIVE | Noted: 2023-12-28

## 2024-01-02 LAB
ANION GAP SERPL CALC-SCNC: 12 MMOL/L — SIGNIFICANT CHANGE UP (ref 5–17)
ANION GAP SERPL CALC-SCNC: 12 MMOL/L — SIGNIFICANT CHANGE UP (ref 5–17)
APTT BLD: 35.7 SEC — HIGH (ref 24.5–35.6)
APTT BLD: 35.7 SEC — HIGH (ref 24.5–35.6)
BLD GP AB SCN SERPL QL: NEGATIVE — SIGNIFICANT CHANGE UP
BLD GP AB SCN SERPL QL: NEGATIVE — SIGNIFICANT CHANGE UP
BUN SERPL-MCNC: 17 MG/DL — SIGNIFICANT CHANGE UP (ref 7–23)
BUN SERPL-MCNC: 17 MG/DL — SIGNIFICANT CHANGE UP (ref 7–23)
CALCIUM SERPL-MCNC: 8.9 MG/DL — SIGNIFICANT CHANGE UP (ref 8.4–10.5)
CALCIUM SERPL-MCNC: 8.9 MG/DL — SIGNIFICANT CHANGE UP (ref 8.4–10.5)
CHLORIDE SERPL-SCNC: 105 MMOL/L — SIGNIFICANT CHANGE UP (ref 96–108)
CHLORIDE SERPL-SCNC: 105 MMOL/L — SIGNIFICANT CHANGE UP (ref 96–108)
CO2 SERPL-SCNC: 25 MMOL/L — SIGNIFICANT CHANGE UP (ref 22–31)
CO2 SERPL-SCNC: 25 MMOL/L — SIGNIFICANT CHANGE UP (ref 22–31)
CREAT SERPL-MCNC: 0.66 MG/DL — SIGNIFICANT CHANGE UP (ref 0.5–1.3)
CREAT SERPL-MCNC: 0.66 MG/DL — SIGNIFICANT CHANGE UP (ref 0.5–1.3)
CULTURE RESULTS: SIGNIFICANT CHANGE UP
CULTURE RESULTS: SIGNIFICANT CHANGE UP
EGFR: 87 ML/MIN/1.73M2 — SIGNIFICANT CHANGE UP
EGFR: 87 ML/MIN/1.73M2 — SIGNIFICANT CHANGE UP
GLUCOSE SERPL-MCNC: 107 MG/DL — HIGH (ref 70–99)
GLUCOSE SERPL-MCNC: 107 MG/DL — HIGH (ref 70–99)
HCT VFR BLD CALC: 26.5 % — LOW (ref 34.5–45)
HCT VFR BLD CALC: 26.5 % — LOW (ref 34.5–45)
HGB BLD-MCNC: 8.6 G/DL — LOW (ref 11.5–15.5)
HGB BLD-MCNC: 8.6 G/DL — LOW (ref 11.5–15.5)
INR BLD: 1.06 RATIO — SIGNIFICANT CHANGE UP (ref 0.85–1.18)
INR BLD: 1.06 RATIO — SIGNIFICANT CHANGE UP (ref 0.85–1.18)
MCHC RBC-ENTMCNC: 28.9 PG — SIGNIFICANT CHANGE UP (ref 27–34)
MCHC RBC-ENTMCNC: 28.9 PG — SIGNIFICANT CHANGE UP (ref 27–34)
MCHC RBC-ENTMCNC: 32.5 GM/DL — SIGNIFICANT CHANGE UP (ref 32–36)
MCHC RBC-ENTMCNC: 32.5 GM/DL — SIGNIFICANT CHANGE UP (ref 32–36)
MCV RBC AUTO: 88.9 FL — SIGNIFICANT CHANGE UP (ref 80–100)
MCV RBC AUTO: 88.9 FL — SIGNIFICANT CHANGE UP (ref 80–100)
NRBC # BLD: 0 /100 WBCS — SIGNIFICANT CHANGE UP (ref 0–0)
NRBC # BLD: 0 /100 WBCS — SIGNIFICANT CHANGE UP (ref 0–0)
PLATELET # BLD AUTO: 222 K/UL — SIGNIFICANT CHANGE UP (ref 150–400)
PLATELET # BLD AUTO: 222 K/UL — SIGNIFICANT CHANGE UP (ref 150–400)
POTASSIUM SERPL-MCNC: 3.3 MMOL/L — LOW (ref 3.5–5.3)
POTASSIUM SERPL-MCNC: 3.3 MMOL/L — LOW (ref 3.5–5.3)
POTASSIUM SERPL-SCNC: 3.3 MMOL/L — LOW (ref 3.5–5.3)
POTASSIUM SERPL-SCNC: 3.3 MMOL/L — LOW (ref 3.5–5.3)
PROTHROM AB SERPL-ACNC: 11.6 SEC — SIGNIFICANT CHANGE UP (ref 9.5–13)
PROTHROM AB SERPL-ACNC: 11.6 SEC — SIGNIFICANT CHANGE UP (ref 9.5–13)
RBC # BLD: 2.98 M/UL — LOW (ref 3.8–5.2)
RBC # BLD: 2.98 M/UL — LOW (ref 3.8–5.2)
RBC # FLD: 15.1 % — HIGH (ref 10.3–14.5)
RBC # FLD: 15.1 % — HIGH (ref 10.3–14.5)
RH IG SCN BLD-IMP: POSITIVE — SIGNIFICANT CHANGE UP
RH IG SCN BLD-IMP: POSITIVE — SIGNIFICANT CHANGE UP
SODIUM SERPL-SCNC: 142 MMOL/L — SIGNIFICANT CHANGE UP (ref 135–145)
SODIUM SERPL-SCNC: 142 MMOL/L — SIGNIFICANT CHANGE UP (ref 135–145)
SPECIMEN SOURCE: SIGNIFICANT CHANGE UP
SPECIMEN SOURCE: SIGNIFICANT CHANGE UP
TSH SERPL-MCNC: 4.67 UIU/ML — HIGH (ref 0.27–4.2)
TSH SERPL-MCNC: 4.67 UIU/ML — HIGH (ref 0.27–4.2)
WBC # BLD: 6.77 K/UL — SIGNIFICANT CHANGE UP (ref 3.8–10.5)
WBC # BLD: 6.77 K/UL — SIGNIFICANT CHANGE UP (ref 3.8–10.5)
WBC # FLD AUTO: 6.77 K/UL — SIGNIFICANT CHANGE UP (ref 3.8–10.5)
WBC # FLD AUTO: 6.77 K/UL — SIGNIFICANT CHANGE UP (ref 3.8–10.5)

## 2024-01-02 PROCEDURE — 99222 1ST HOSP IP/OBS MODERATE 55: CPT

## 2024-01-02 PROCEDURE — 99223 1ST HOSP IP/OBS HIGH 75: CPT | Mod: GC

## 2024-01-02 RX ORDER — INFLUENZA VIRUS VACCINE 15; 15; 15; 15 UG/.5ML; UG/.5ML; UG/.5ML; UG/.5ML
0.7 SUSPENSION INTRAMUSCULAR ONCE
Refills: 0 | Status: DISCONTINUED | OUTPATIENT
Start: 2024-01-02 | End: 2024-01-04

## 2024-01-02 RX ORDER — ASPIRIN/CALCIUM CARB/MAGNESIUM 324 MG
81 TABLET ORAL DAILY
Refills: 0 | Status: DISCONTINUED | OUTPATIENT
Start: 2024-01-02 | End: 2024-01-04

## 2024-01-02 RX ORDER — OXYBUTYNIN CHLORIDE 5 MG
10 TABLET ORAL
Refills: 0 | Status: DISCONTINUED | OUTPATIENT
Start: 2024-01-02 | End: 2024-01-04

## 2024-01-02 RX ORDER — POTASSIUM CHLORIDE 20 MEQ
20 PACKET (EA) ORAL
Refills: 0 | Status: COMPLETED | OUTPATIENT
Start: 2024-01-02 | End: 2024-01-02

## 2024-01-02 RX ORDER — SODIUM CHLORIDE 5 %
1 DROPS OPHTHALMIC (EYE) DAILY
Refills: 0 | Status: DISCONTINUED | OUTPATIENT
Start: 2024-01-02 | End: 2024-01-04

## 2024-01-02 RX ORDER — METOPROLOL TARTRATE 50 MG
12.5 TABLET ORAL
Refills: 0 | Status: DISCONTINUED | OUTPATIENT
Start: 2024-01-02 | End: 2024-01-04

## 2024-01-02 RX ORDER — SENNA PLUS 8.6 MG/1
2 TABLET ORAL AT BEDTIME
Refills: 0 | Status: DISCONTINUED | OUTPATIENT
Start: 2024-01-02 | End: 2024-01-04

## 2024-01-02 RX ORDER — ZOLPIDEM TARTRATE 10 MG/1
5 TABLET ORAL AT BEDTIME
Refills: 0 | Status: DISCONTINUED | OUTPATIENT
Start: 2024-01-02 | End: 2024-01-04

## 2024-01-02 RX ORDER — VENLAFAXINE HCL 75 MG
150 CAPSULE, EXT RELEASE 24 HR ORAL DAILY
Refills: 0 | Status: DISCONTINUED | OUTPATIENT
Start: 2024-01-02 | End: 2024-01-04

## 2024-01-02 RX ORDER — ATORVASTATIN CALCIUM 80 MG/1
20 TABLET, FILM COATED ORAL AT BEDTIME
Refills: 0 | Status: DISCONTINUED | OUTPATIENT
Start: 2024-01-02 | End: 2024-01-04

## 2024-01-02 RX ORDER — DICLOFENAC SODIUM 75 MG/1
75 TABLET, DELAYED RELEASE ORAL DAILY
Refills: 0 | Status: DISCONTINUED | OUTPATIENT
Start: 2024-01-02 | End: 2024-01-04

## 2024-01-02 RX ADMIN — Medication 1 DROP(S): at 21:35

## 2024-01-02 RX ADMIN — Medication 10 MILLIGRAM(S): at 17:41

## 2024-01-02 RX ADMIN — Medication 81 MILLIGRAM(S): at 09:47

## 2024-01-02 RX ADMIN — Medication 20 MILLIEQUIVALENT(S): at 12:33

## 2024-01-02 RX ADMIN — Medication 20 MILLIEQUIVALENT(S): at 09:47

## 2024-01-02 RX ADMIN — Medication 12.5 MILLIGRAM(S): at 17:41

## 2024-01-02 RX ADMIN — DICLOFENAC SODIUM 75 MILLIGRAM(S): 75 TABLET, DELAYED RELEASE ORAL at 12:34

## 2024-01-02 RX ADMIN — Medication 1 DROP(S): at 12:33

## 2024-01-02 RX ADMIN — Medication 150 MILLIGRAM(S): at 12:33

## 2024-01-02 RX ADMIN — Medication 10 MILLIGRAM(S): at 05:30

## 2024-01-02 RX ADMIN — Medication 20 MILLIEQUIVALENT(S): at 15:39

## 2024-01-02 RX ADMIN — ZOLPIDEM TARTRATE 5 MILLIGRAM(S): 10 TABLET ORAL at 23:30

## 2024-01-02 RX ADMIN — Medication 1 DROP(S): at 15:40

## 2024-01-02 RX ADMIN — Medication 1 DROP(S): at 05:33

## 2024-01-02 RX ADMIN — ATORVASTATIN CALCIUM 20 MILLIGRAM(S): 80 TABLET, FILM COATED ORAL at 21:19

## 2024-01-02 RX ADMIN — Medication 12.5 MILLIGRAM(S): at 05:30

## 2024-01-02 NOTE — CONSULT NOTE ADULT - SUBJECTIVE AND OBJECTIVE BOX
HPI  83yFemale PMH b/l reverse TSA ([facility] '19, '22), b/l TKA ([facility] '04 [facility]), HTN, GERD, diverticulitis s/p hemicolectemy presents s/p syncopal fall 2/2 afib w RVR. Orthopedics consulted as on CT RLE she was found to have a chronic-appearing patella fx. Also found to have a hematoma near R greater troch. She denies pain in R knee and reports she has known about this patella fx for 15+ years. Reports some soreness in the R buttox area near the hematoma and well as some R shoulder soreness but otherwise feels well and has been able to bear weight on both LEs since her admission. Denies any other trauma/injuries at this time. At baseline, community ambulator w/o assistive devices.    ROS  Negative unless otherwise specified in HPI.    PAST MEDICAL & SURGICAL Hx  PAST MEDICAL & SURGICAL HISTORY:  H/O: HTN (hypertension)      H/O: hysterectomy      History of knee replacement      History of total shoulder replacement          MEDICATIONS  Home Medications:  amLODIPine 5 mg oral tablet: 1 tab(s) orally once a day Hold for SBP less than 100 (30 Dec 2023 16:17)  aspirin 81 mg oral delayed release tablet: 1 tab(s) orally once a day (28 Dec 2023 20:58)  atorvastatin 20 mg oral tablet: 1 tab(s) orally once a day (at bedtime) (28 Dec 2023 21:01)  diclofenac sodium 75 mg oral delayed release tablet: 1 tab(s) orally once a day - taken with misoprostol 200mcg (28 Dec 2023 21:01)  miSOPROStol 200 mcg oral tablet: 1 tab(s) orally once a day - taken with diclofenac 75mg (28 Dec 2023 21:01)  Camilla 128 5% ophthalmic solution: 1 drop(s) in each eye once a day (28 Dec 2023 20:59)  otc supplements: multivitamin / vitamin d3 (28 Dec 2023 20:59)  oxyBUTYnin 10 mg/24 hr oral tablet, extended release: 1 tab(s) orally 2 times a day (28 Dec 2023 21:01)  Refresh ophthalmic solution: 1 drop(s) in each eye 3 times a day (28 Dec 2023 20:59)  triamterene-hydrochlorothiazide 37.5 mg-25 mg oral tablet: 1 tab(s) orally once a day (28 Dec 2023 21:01)  venlafaxine 150 mg oral capsule, extended release: 1 cap(s) orally once a day (28 Dec 2023 21:01)  zolpidem 10 mg oral tablet: 0.5 tab(s) orally once a day (at bedtime) (28 Dec 2023 21:01)      ALLERGIES  sulfa drugs (Rash)      FAMILY Hx  FAMILY HISTORY:      SOCIAL Hx  Social History:  Lives alone at home. (02 Jan 2024 02:19)      VITALS  Vital Signs Last 24 Hrs  T(C): 37.1 (02 Jan 2024 09:20), Max: 37.1 (02 Jan 2024 09:20)  T(F): 98.8 (02 Jan 2024 09:20), Max: 98.8 (02 Jan 2024 09:20)  HR: 76 (02 Jan 2024 09:20) (76 - 135)  BP: 116/66 (02 Jan 2024 09:20) (97/61 - 119/64)  BP(mean): 67 (02 Jan 2024 02:23) (67 - 98)  RR: 17 (02 Jan 2024 09:20) (17 - 22)  SpO2: 98% (02 Jan 2024 09:20) (95% - 99%)    Parameters below as of 02 Jan 2024 09:20  Patient On (Oxygen Delivery Method): room air        PHYSICAL EXAM  Gen: Lying in bed, NAD  Resp: No increased WOB  RLE:  Skin intact, no edema or ecchymosis over R knee. Ecchymosis noted at R proximal lateral thigh near greater troch  +TTP over R knee, no palpable patellar defect, no TTP along remainder of extremity; compartments soft  Full nonpainful ROM of knee 2/2 pain  Able to SLR  Motor: TA/EHL/GS/FHL intact  Sensory: DP/SP/Tib/Shama/Saph SILT  +DP pulse, WWP  No pain w log roll or axial loading    RUE:  Full nonpainful shoulder ROM  + AIN/PIN/IO  C5-T1 SILT  compartments soft  radial pulse 2+      Secondary survey:  No TTP along spine or other extremities, pelvis grossly stable, SILT and compartments soft throughout    LABS                        8.6    6.77  )-----------( 222      ( 02 Jan 2024 06:56 )             26.5     01-02    142  |  105  |  17  ----------------------------<  107<H>  3.3<L>   |  25  |  0.66    Ca    8.9      02 Jan 2024 06:56  Phos  3.1     01-01  Mg     1.9     01-01    TPro  5.9<L>  /  Alb  3.8  /  TBili  1.6<H>  /  DBili  x   /  AST  27  /  ALT  19  /  AlkPhos  95  01-01    PT/INR - ( 02 Jan 2024 06:56 )   PT: 11.6 sec;   INR: 1.06 ratio         PTT - ( 02 Jan 2024 06:56 )  PTT:35.7 sec    IMAGING  CT: well-corticated, chronic appearing R patella fx with unresurfaced patella in setting of R TKA. 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 Able to SLR.    - WBAT RLE   - No ortho surgery indicated at this time  - Ortho to sign off, please reach out with questions or concerns        For all questions related to patient care, please reach out via the on-call pager. Do NOT rely on Teams to contact orthopedic residents.*     Nita Funez, PGY-2  Orthopedic Surgery  [facility]: [phone]  [facility]: [phone]  [facility]: [phone]   HPI  83yFemale PMH b/l reverse TSA ([facility] '19, '22), b/l TKA ([facility] '04 [facility]), HTN, GERD, diverticulitis s/p hemicolectemy presents s/p syncopal fall 2/2 afib w RVR. Orthopedics consulted as on CT RLE she was found to have a chronic-appearing patella fx. Also found to have a hematoma near R greater troch. She denies pain in R knee and reports she has known about this patella fx for 15+ years. Reports some soreness in the R buttox area near the hematoma and well as some R shoulder soreness but otherwise feels well and has been able to bear weight on both LEs since her admission. Denies any other trauma/injuries at this time. At baseline, community ambulator w/o assistive devices.    ROS  Negative unless otherwise specified in HPI.    PAST MEDICAL & SURGICAL Hx  PAST MEDICAL & SURGICAL HISTORY:  H/O: HTN (hypertension)      H/O: hysterectomy      History of knee replacement      History of total shoulder replacement          MEDICATIONS  Home Medications:  amLODIPine 5 mg oral tablet: 1 tab(s) orally once a day Hold for SBP less than 100 (30 Dec 2023 16:17)  aspirin 81 mg oral delayed release tablet: 1 tab(s) orally once a day (28 Dec 2023 20:58)  atorvastatin 20 mg oral tablet: 1 tab(s) orally once a day (at bedtime) (28 Dec 2023 21:01)  diclofenac sodium 75 mg oral delayed release tablet: 1 tab(s) orally once a day - taken with misoprostol 200mcg (28 Dec 2023 21:01)  miSOPROStol 200 mcg oral tablet: 1 tab(s) orally once a day - taken with diclofenac 75mg (28 Dec 2023 21:01)  Camilla 128 5% ophthalmic solution: 1 drop(s) in each eye once a day (28 Dec 2023 20:59)  otc supplements: multivitamin / vitamin d3 (28 Dec 2023 20:59)  oxyBUTYnin 10 mg/24 hr oral tablet, extended release: 1 tab(s) orally 2 times a day (28 Dec 2023 21:01)  Refresh ophthalmic solution: 1 drop(s) in each eye 3 times a day (28 Dec 2023 20:59)  triamterene-hydrochlorothiazide 37.5 mg-25 mg oral tablet: 1 tab(s) orally once a day (28 Dec 2023 21:01)  venlafaxine 150 mg oral capsule, extended release: 1 cap(s) orally once a day (28 Dec 2023 21:01)  zolpidem 10 mg oral tablet: 0.5 tab(s) orally once a day (at bedtime) (28 Dec 2023 21:01)      ALLERGIES  sulfa drugs (Rash)      FAMILY Hx  FAMILY HISTORY:      SOCIAL Hx  Social History:  Lives alone at home. (02 Jan 2024 02:19)      VITALS  Vital Signs Last 24 Hrs  T(C): 37.1 (02 Jan 2024 09:20), Max: 37.1 (02 Jan 2024 09:20)  T(F): 98.8 (02 Jan 2024 09:20), Max: 98.8 (02 Jan 2024 09:20)  HR: 76 (02 Jan 2024 09:20) (76 - 135)  BP: 116/66 (02 Jan 2024 09:20) (97/61 - 119/64)  BP(mean): 67 (02 Jan 2024 02:23) (67 - 98)  RR: 17 (02 Jan 2024 09:20) (17 - 22)  SpO2: 98% (02 Jan 2024 09:20) (95% - 99%)    Parameters below as of 02 Jan 2024 09:20  Patient On (Oxygen Delivery Method): room air        PHYSICAL EXAM  Gen: Lying in bed, NAD  Resp: No increased WOB  RLE:  Skin intact, no edema or ecchymosis over R knee. Ecchymosis noted at R proximal lateral thigh near greater troch  +TTP over R knee, no palpable patellar defect, no TTP along remainder of extremity; compartments soft  Full nonpainful ROM of knee 2/2 pain  Able to SLR  Motor: TA/EHL/GS/FHL intact  Sensory: DP/SP/Tib/Shama/Saph SILT  +DP pulse, WWP  No pain w log roll or axial loading    RUE:  Full nonpainful shoulder ROM  + AIN/PIN/IO  C5-T1 SILT  compartments soft  radial pulse 2+      Secondary survey:  No TTP along spine or other extremities, pelvis grossly stable, SILT and compartments soft throughout    LABS                        8.6    6.77  )-----------( 222      ( 02 Jan 2024 06:56 )             26.5     01-02    142  |  105  |  17  ----------------------------<  107<H>  3.3<L>   |  25  |  0.66    Ca    8.9      02 Jan 2024 06:56  Phos  3.1     01-01  Mg     1.9     01-01    TPro  5.9<L>  /  Alb  3.8  /  TBili  1.6<H>  /  DBili  x   /  AST  27  /  ALT  19  /  AlkPhos  95  01-01    PT/INR - ( 02 Jan 2024 06:56 )   PT: 11.6 sec;   INR: 1.06 ratio         PTT - ( 02 Jan 2024 06:56 )  PTT:35.7 sec    IMAGING  CT: well-corticated, chronic appearing R patella fx with unresurfaced patella in setting of R TKA. 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 Able to SLR.    - WBAT RLE   - No ortho surgery indicated at this time  - Ortho to sign off, please reach out with questions or concerns        For all questions related to patient care, please reach out via the on-call pager. Do NOT rely on Teams to contact orthopedic residents.*     Nita Funez, PGY-2  Orthopedic Surgery  [facility]: [phone]  [facility] [phone]  [facility]: [phone]

## 2024-01-02 NOTE — H&P ADULT - PROBLEM SELECTOR PROBLEM 1
Unspecified atrial fibrillation Fall in home Paroxysmal atrial fibrillation Albendazole Pregnancy And Lactation Text: This medication is Pregnancy Category C and it isn't known if it is safe during pregnancy. It is also excreted in breast milk.

## 2024-01-02 NOTE — CONSULT NOTE ADULT - ASSESSMENT
ASSESSMENT: 82y/o F w/ PMHx HTN, GERD, diverticulitis w/ hx of hemicolectomy d/t abscess c/b empyema requiring thoracotomy, HLD presents for diaphoresis and generalized weakness. Patient was recently admitted 12/28/23 for 2 falls and underwent syncopal workup with no acute findings. In the ED, patient presented with new onset Afib. CT w/ proximal right thigh subcutaneous hematoma 3.9 x 7 x 12.5cm that slightly abuts the deep fascial plane.    PLAN:  - No acute trauma surgical intervention  - Trend CBC  - Recommend medicine admission for new onset Afib and recurrent falls  - Would not recommend AC for new onset Afib in setting of hematoma and downtrending Hgb - patient also discussed this with her cardiologist (Dr. Traube) who is in agreement  - Tertiary in AM  - Surgery will follow      Patient discussed with trauma attending, Dr. Verma.    Lizette Miller, PGY-2  ACS/Trauma  [phone] ASSESSMENT: 82y/o F w/ PMHx HTN, GERD, diverticulitis w/ hx of hemicolectomy d/t abscess c/b empyema requiring thoracotomy, HLD presents for diaphoresis and generalized weakness. Patient was recently admitted 12/28/23 for 2 falls and underwent syncopal workup with no acute findings. In the ED, patient presented with new onset Afib. CT w/ proximal right thigh subcutaneous hematoma 3.9 x 7 x 12.5cm that slightly abuts the deep fascial plane.    PLAN:  - No acute trauma surgical intervention  - Trend CBC  - Recommend medicine admission for new onset Afib and recurrent falls  - Would not recommend AC for new onset Afib in setting of hematoma and downtrending Hgb - patient also discussed this with her cardiologist (Dr. Traube) who is in agreement  - Tertiary in AM  - Surgery will follow      Patient discussed with trauma attending, Dr. Verma.    Lizette Miller, PGY-2  ACS/Trauma  [phone]

## 2024-01-02 NOTE — H&P ADULT - NSHPLABSRESULTS_GEN_ALL_CORE
LABS:                          7.8    7.28  )-----------( 210      ( 01 Jan 2024 23:32 )             24.1     01-01    140  |  104  |  19  ----------------------------<  114<H>  4.0   |  23  |  0.69    Ca    9.2      01 Jan 2024 17:56  Phos  3.1     01-01  Mg     1.9     01-01    TPro  5.9<L>  /  Alb  3.8  /  TBili  1.6<H>  /  DBili  x   /  AST  27  /  ALT  19  /  AlkPhos  95  01-01

## 2024-01-02 NOTE — H&P ADULT - TIME BILLING
Chart review , case discussion with ED and other providers , obtain history ,  examination of patient , answering questions and concerns , reviewing /ordering labs and medications , and documentation

## 2024-01-02 NOTE — PATIENT PROFILE ADULT - FALL HARM RISK - HARM RISK INTERVENTIONS
Assistance with ambulation/Assistance OOB with selected safe patient handling equipment/Communicate Risk of Fall with Harm to all staff/Discuss with provider need for PT consult/Monitor gait and stability/Provide patient with walking aids - walker, cane, crutches/Reinforce activity limits and safety measures with patient and family/Tailored Fall Risk Interventions/Visual Cue: Yellow wristband and red socks/Bed in lowest position, wheels locked, appropriate side rails in place/Call bell, personal items and telephone in reach/Instruct patient to call for assistance before getting out of bed or chair/Non-slip footwear when patient is out of bed/[place] to call system/Physically safe environment - no spills, clutter or unnecessary equipment/Purposeful Proactive Rounding/Room/bathroom lighting operational, light cord in reach Assistance with ambulation/Assistance OOB with selected safe patient handling equipment/Communicate Risk of Fall with Harm to all staff/Discuss with provider need for PT consult/Monitor gait and stability/Provide patient with walking aids - walker, cane, crutches/Reinforce activity limits and safety measures with patient and family/Tailored Fall Risk Interventions/Visual Cue: Yellow wristband and red socks/Bed in lowest position, wheels locked, appropriate side rails in place/Call bell, personal items and telephone in reach/Instruct patient to call for assistance before getting out of bed or chair/Non-slip footwear when patient is out of bed/[place] to call system/Physically safe environment - no spills, clutter or unnecessary equipment/Purposeful Proactive Rounding/Room/bathroom lighting operational, light cord in reach

## 2024-01-02 NOTE — H&P ADULT - ATTENDING COMMENTS
83F  w/ hx of HTN, GERD, diverticulosis c/b abscess requiring hemicolectomy further c/b empyema requiring thoracotomy and HLD, recent mechanical fall , now p/w generalized weakness , lightheadedness , found to be in Afib w/rvr, afebrile , BP soft , large R thigh hematoma on exam,  labs significant for  hgb decrease to 7.8 , imaging with large R thigh hematoma , EKG afib , currently sinus on telemetry;  will monitor H/H , transfuse PRBCs as needed , chadsvasc 4 , however in state of current hematoma , would hold off on AC can discuss with Cardiology post discharge , continue lopressor for rate control. Case discussed with trauma surgery who recommend no surgical intervention , will perform tertiary trauma exam during day time.

## 2024-01-02 NOTE — H&P ADULT - PROBLEM SELECTOR PLAN 4
Fluids: s/p 1.5L   Diet: DASH   DVT ppx: SCDs    GoC: full code  PT: consult placed    Dispo: pending clinical course

## 2024-01-02 NOTE — H&P ADULT - NSHPREVIEWOFSYSTEMS_GEN_ALL_CORE
REVIEW OF SYSTEMS:  CONSTITUTIONAL: No fevers or chills  EYES/ENT: No visual changes;  No vertigo or throat pain   NECK: No pain or stiffness  RESPIRATORY: No cough, wheezing, hemoptysis; No shortness of breath  CARDIOVASCULAR: No chest pain or palpitations  GASTROINTESTINAL: No abdominal or epigastric pain. No nausea, vomiting, or hematemesis; No diarrhea or constipation. No melena or hematochezia.  GENITOURINARY: No dysuria, frequency or hematuria  NEUROLOGICAL: No syncope or dizziness  SKIN: No itching, rashes

## 2024-01-02 NOTE — H&P ADULT - PROBLEM SELECTOR PLAN 2
New onset atrial fibrillation. No priox hx. Structural disease on TTE performed 12/29/23 w/ normal LV systolic function, grade 2 LV diastolic dysfunction; left atrium severely dilated   - CHADsVASc score of 4 indicating 4.8% stroke risk per year   - needs AC, however, complicated w/ hematoma and worsening hgb   - start lopressor 12.5mg BID, currently NSR rates 70s  - if rates increasing consider additional fluid bolus Another fall at home. Describes it as a mechanical fall. Recent extensive workup regarding fall without etiology.   - orthostatics   - PT consult  - treat atrial fibrillation as below

## 2024-01-02 NOTE — CONSULT NOTE ADULT - SUBJECTIVE AND OBJECTIVE BOX
Cardiovascular Disease Initial Evaluation  Date of service: 01-02-24 @ 12:24    CHIEF COMPLAINT:  Fall       HISTORY OF PRESENT ILLNESS:  Ms. Tuckre is an 83 year old female w/ hx of HTN, GERD, diverticulosis c/b abscess requiring hemicolectomy further c/b empyema requiring thoracotomy and HLD presenting via EMS for episode of diaphoresis and generalized weakness. Of note, patient had a recent admission for unwitnessed mechanical fall w/ head strike. Able to ambulate after the fall. Underwent TTE showing insignificant findings. Carotid dopplers w/o stenosis and lower extremity without DVT. Discharged with negative workup with follow-up cardiology and neurology. She lives alone and normally does not use assisted devices at home. Patient states she was in her usual state of health at home but began feeling weak. Patient noted her HR to be irregular. EMS was called and patient was brought to the ED for further assessment. Patient found to be in new onset Afib. Patient denies chest pain or SOB.     Of note, patient follows with Dr. Charles Traube [phone]      Allergies    sulfa drugs (Rash)    Intolerances    	    MEDICATIONS:  aspirin enteric coated 81 milliGRAM(s) Oral daily  metoprolol tartrate 12.5 milliGRAM(s) Oral two times a day        diclofenac 75 milliGRAM(s) Oral daily  venlafaxine XR. 150 milliGRAM(s) Oral daily  zolpidem 5 milliGRAM(s) Oral at bedtime PRN    misoprostol 200 MICROGram(s) Oral <User Schedule>  senna 2 Tablet(s) Oral at bedtime    atorvastatin 20 milliGRAM(s) Oral at bedtime    artificial  tears Solution 1 Drop(s) Both EYES three times a day  oxybutynin 10 milliGRAM(s) Oral two times a day  potassium chloride    Tablet ER 20 milliEquivalent(s) Oral every 2 hours  sodium chloride 2% Ophthalmic Solution 1 Drop(s) Both EYES daily      PAST MEDICAL & SURGICAL HISTORY:  H/O: HTN (hypertension)      H/O: hysterectomy      History of knee replacement      History of total shoulder replacement          FAMILY HISTORY:      SOCIAL HISTORY:    The patient is a nonsmoker       REVIEW OF SYSTEMS:  See HPI, otherwise complete 14 point review of systems negative    [x ] All others negative	  [ ] Unable to obtain    PHYSICAL EXAM:  T(C): 37.1 (01-02-24 @ 09:20), Max: 37.1 (01-02-24 @ 09:20)  HR: 76 (01-02-24 @ 09:20) (76 - 135)  BP: 116/66 (01-02-24 @ 09:20) (97/61 - 119/64)  RR: 17 (01-02-24 @ 09:20) (17 - 22)  SpO2: 98% (01-02-24 @ 09:20) (95% - 99%)  Wt(kg): --  I&O's Summary      Appearance: No Acute Distress; resting comfortably  HEENT:  Normal oral mucosa, PERRL, EOMI	  Cardiovascular: Normal S1 S2, No JVD, No murmurs/rubs/gallops  Respiratory: Normal respiratory effort; Lungs clear to auscultation bilaterally  Gastrointestinal:  Soft, Non-tender, + BS	  Skin: No rashes, No ecchymoses, No cyanosis	  Neurologic: Non-focal; no weakness  Extremities: No clubbing, cyanosis or edema  Vascular: Peripheral pulses palpable 2+ bilaterally  Psychiatry: A & O x 3, Mood & affect appropriate    Laboratory Data:	 	    CBC Full  -  ( 02 Jan 2024 06:56 )  WBC Count : 6.77 K/uL  Hemoglobin : 8.6 g/dL  Hematocrit : 26.5 %  Platelet Count - Automated : 222 K/uL  Mean Cell Volume : 88.9 fl  Mean Cell Hemoglobin : 28.9 pg  Mean Cell Hemoglobin Concentration : 32.5 gm/dL  Auto Neutrophil # : x  Auto Lymphocyte # : x  Auto Monocyte # : x  Auto Eosinophil # : x  Auto Basophil # : x  Auto Neutrophil % : x  Auto Lymphocyte % : x  Auto Monocyte % : x  Auto Eosinophil % : x  Auto Basophil % : x    01-02    142  |  105  |  17  ----------------------------<  107<H>  3.3<L>   |  25  |  0.66  01-01    140  |  104  |  19  ----------------------------<  114<H>  4.0   |  23  |  0.69    Ca    8.9      02 Jan 2024 06:56  Ca    9.2      01 Jan 2024 17:56  Phos  3.1     01-01  Mg     1.9     01-01    TPro  5.9<L>  /  Alb  3.8  /  TBili  1.6<H>  /  DBili  x   /  AST  27  /  ALT  19  /  AlkPhos  95  01-01      proBNP:   Lipid Profile:   HgA1c:   TSH: Thyroid Stimulating Hormone, Serum: 4.67 uIU/mL (01-01 @ 17:56)        CARDIAC MARKERS:             Interpretation of Telemetry: Sinus with PACs	    ECG:  Afib RVR  RADIOLOGY:  OTHER: 	    PREVIOUS DIAGNOSTIC TESTING:    [x ] Echocardiogram:    1. Left ventricular cavity is normal. Left ventricular wall thickness is normal. Left ventricular systolic function is normal with an ejection fraction of 74 % by Rosenbaum's method of disks. There are no regional wall motion abnormalities seen.   2. There is moderate (grade 2) left ventricular diastolic dysfunction, with normal filling pressure.   3. Normal right ventricular cavity size, wall thickness, and systolic function.   4. The left atrium is severely dilated.   5. Moderate mitral regurgitation.   6. Mild to moderate mitral valve stenosis.   7. No pericardial effusion seen.   8. Estimated pulmonary artery systolic pressure is 40 mmHg.   9. No prior echocardiogram is available for comparison.    [ ] Catheterization:  [ ] Stress Test:  	    Assessment:  83 year old female w/ hx of HTN, GERD, diverticulosis c/b abscess requiring hemicolectomy and HLD presenting via EMS for episode of diaphoresis and generalized weakness.     Plan of Care:      #Afib  - New onset  - HR now in sinus with PACs  - On imaging Ms. Tucker is found to have a subcutaneous hematoma over the R greater trochanter 2/2 recent fall  - Will hold off on AC at this time   - Recent Echo showed a hyperdynamic LV with mild to moderate Mitral stenosis.   - please reassess orthostatics  - If positive, recommend fluid hydration    #HTN  - BP acceptable    #HLD   - Statin therapy          72 minutes spent on total encounter; more than 50% of the visit was spent counseling and/or coordinating care by the attending physician.   	  Jan Garrison,  [facility]  Cardiovascular Diseases  [phone]     Cardiovascular Disease Initial Evaluation  Date of service: 01-02-24 @ 12:24    CHIEF COMPLAINT:  Fall       HISTORY OF PRESENT ILLNESS:  Ms. Tucker is an 83 year old female w/ hx of HTN, GERD, diverticulosis c/b abscess requiring hemicolectomy further c/b empyema requiring thoracotomy and HLD presenting via EMS for episode of diaphoresis and generalized weakness. Of note, patient had a recent admission for unwitnessed mechanical fall w/ head strike. Able to ambulate after the fall. Underwent TTE showing insignificant findings. Carotid dopplers w/o stenosis and lower extremity without DVT. Discharged with negative workup with follow-up cardiology and neurology. She lives alone and normally does not use assisted devices at home. Patient states she was in her usual state of health at home but began feeling weak. Patient noted her HR to be irregular. EMS was called and patient was brought to the ED for further assessment. Patient found to be in new onset Afib. Patient denies chest pain or SOB.     Of note, patient follows with Dr. Charles Traube [phone]      Allergies    sulfa drugs (Rash)    Intolerances    	    MEDICATIONS:  aspirin enteric coated 81 milliGRAM(s) Oral daily  metoprolol tartrate 12.5 milliGRAM(s) Oral two times a day        diclofenac 75 milliGRAM(s) Oral daily  venlafaxine XR. 150 milliGRAM(s) Oral daily  zolpidem 5 milliGRAM(s) Oral at bedtime PRN    misoprostol 200 MICROGram(s) Oral <User Schedule>  senna 2 Tablet(s) Oral at bedtime    atorvastatin 20 milliGRAM(s) Oral at bedtime    artificial  tears Solution 1 Drop(s) Both EYES three times a day  oxybutynin 10 milliGRAM(s) Oral two times a day  potassium chloride    Tablet ER 20 milliEquivalent(s) Oral every 2 hours  sodium chloride 2% Ophthalmic Solution 1 Drop(s) Both EYES daily      PAST MEDICAL & SURGICAL HISTORY:  H/O: HTN (hypertension)      H/O: hysterectomy      History of knee replacement      History of total shoulder replacement          FAMILY HISTORY:      SOCIAL HISTORY:    The patient is a nonsmoker       REVIEW OF SYSTEMS:  See HPI, otherwise complete 14 point review of systems negative    [x ] All others negative	  [ ] Unable to obtain    PHYSICAL EXAM:  T(C): 37.1 (01-02-24 @ 09:20), Max: 37.1 (01-02-24 @ 09:20)  HR: 76 (01-02-24 @ 09:20) (76 - 135)  BP: 116/66 (01-02-24 @ 09:20) (97/61 - 119/64)  RR: 17 (01-02-24 @ 09:20) (17 - 22)  SpO2: 98% (01-02-24 @ 09:20) (95% - 99%)  Wt(kg): --  I&O's Summary      Appearance: No Acute Distress; resting comfortably  HEENT:  Normal oral mucosa, PERRL, EOMI	  Cardiovascular: Normal S1 S2, No JVD, No murmurs/rubs/gallops  Respiratory: Normal respiratory effort; Lungs clear to auscultation bilaterally  Gastrointestinal:  Soft, Non-tender, + BS	  Skin: No rashes, No ecchymoses, No cyanosis	  Neurologic: Non-focal; no weakness  Extremities: No clubbing, cyanosis or edema  Vascular: Peripheral pulses palpable 2+ bilaterally  Psychiatry: A & O x 3, Mood & affect appropriate    Laboratory Data:	 	    CBC Full  -  ( 02 Jan 2024 06:56 )  WBC Count : 6.77 K/uL  Hemoglobin : 8.6 g/dL  Hematocrit : 26.5 %  Platelet Count - Automated : 222 K/uL  Mean Cell Volume : 88.9 fl  Mean Cell Hemoglobin : 28.9 pg  Mean Cell Hemoglobin Concentration : 32.5 gm/dL  Auto Neutrophil # : x  Auto Lymphocyte # : x  Auto Monocyte # : x  Auto Eosinophil # : x  Auto Basophil # : x  Auto Neutrophil % : x  Auto Lymphocyte % : x  Auto Monocyte % : x  Auto Eosinophil % : x  Auto Basophil % : x    01-02    142  |  105  |  17  ----------------------------<  107<H>  3.3<L>   |  25  |  0.66  01-01    140  |  104  |  19  ----------------------------<  114<H>  4.0   |  23  |  0.69    Ca    8.9      02 Jan 2024 06:56  Ca    9.2      01 Jan 2024 17:56  Phos  3.1     01-01  Mg     1.9     01-01    TPro  5.9<L>  /  Alb  3.8  /  TBili  1.6<H>  /  DBili  x   /  AST  27  /  ALT  19  /  AlkPhos  95  01-01      proBNP:   Lipid Profile:   HgA1c:   TSH: Thyroid Stimulating Hormone, Serum: 4.67 uIU/mL (01-01 @ 17:56)        CARDIAC MARKERS:             Interpretation of Telemetry: Sinus with PACs	    ECG:  Afib RVR  RADIOLOGY:  OTHER: 	    PREVIOUS DIAGNOSTIC TESTING:    [x ] Echocardiogram:    1. Left ventricular cavity is normal. Left ventricular wall thickness is normal. Left ventricular systolic function is normal with an ejection fraction of 74 % by Rosenbaum's method of disks. There are no regional wall motion abnormalities seen.   2. There is moderate (grade 2) left ventricular diastolic dysfunction, with normal filling pressure.   3. Normal right ventricular cavity size, wall thickness, and systolic function.   4. The left atrium is severely dilated.   5. Moderate mitral regurgitation.   6. Mild to moderate mitral valve stenosis.   7. No pericardial effusion seen.   8. Estimated pulmonary artery systolic pressure is 40 mmHg.   9. No prior echocardiogram is available for comparison.    [ ] Catheterization:  [ ] Stress Test:  	    Assessment:  83 year old female w/ hx of HTN, GERD, diverticulosis c/b abscess requiring hemicolectomy and HLD presenting via EMS for episode of diaphoresis and generalized weakness.     Plan of Care:      #Afib  - New onset  - HR now in sinus with PACs  - On imaging Ms. Tucker is found to have a subcutaneous hematoma over the R greater trochanter 2/2 recent fall  - Will hold off on AC at this time   - Recent Echo showed a hyperdynamic LV with mild to moderate Mitral stenosis.   - please reassess orthostatics  - If positive, recommend fluid hydration    #HTN  - BP acceptable    #HLD   - Statin therapy          72 minutes spent on total encounter; more than 50% of the visit was spent counseling and/or coordinating care by the attending physician.   	  Jan Garrison,  [facility]  Cardiovascular Diseases  [phone]

## 2024-01-02 NOTE — H&P ADULT - PROBLEM SELECTOR PLAN 1
Another fall at home. Describes it as a mechanical fall. Recent extensive workup regarding fall without etiology.   - orthostatics   - PT consult  - treat atrial fibrillation as below New onset atrial fibrillation. w/ rvr , initially hypotensive now reverted to sinus ,  No priox hx. Structural disease on TTE performed 12/29/23 w/ normal LV systolic function, grade 2 LV diastolic dysfunction; left atrium severely dilated   - CHADsVASc score of 4 indicating 4.8% stroke risk per year   - needs AC, however, complicated w/ hematoma and worsening hgb   - start lopressor 12.5mg BID, currently NSR rates 70s  - if rates increasing consider additional fluid bolus

## 2024-01-02 NOTE — CHART NOTE - NSCHARTNOTEFT_GEN_A_CORE
TERTIARY TRAUMA SURVEY  ------------------------------------------------------------------------------------    Date of TTS:   Time:   Admit Date:    Trauma Activation:   Admit GCS:     HPI:  83 year old female w/ hx of HTN, GERD, diverticulosis c/b abscess requiring hemicolectomy further c/b empyema requiring thoracotomy and HLD presenting via EMS for episode of diaphoresis and generalized weakness. Of note, recent admission for unwitnessed mechanical fall w/ head strike. Able to ambulate after the fall. Underwent TTE showing insignificant findings. Carotid dopplers w/o stenosis and lower extremity without DVT. Discharged with negative workup with follow-up cardiology and neurology. She lives alone and normally does not use assisted devices at home.     She mentions that following discharge, she has another fall. She denies loss of consciousness, prodromal symptoms, seizure like activity or urinary/bowel incontinence. She mentions that she felt her feet trip over each other and she went down, denies head trauma. Was able to get back up without much effort and denies confusion or lack of awareness following event. She presents today due to weakness when she woke up 1/1/24 AM. Denies headaches, dizziness, or other symptoms. No fever, chills. Thought it would be good to come in.     In ED, found to be tachycardic to 130s with atrial fibrillation, BP stable. Labs significant for hg to 7.8. CT imaging w/ findings of small right and trace left pleural effusions. Subcutaneous hematoma in the proximal right thigh.  (02 Jan 2024 02:19)      INTERVAL EVENTS:   - found to have new onset afib and cardiology consulted, admitted to medicine for syncope workup     PAST MEDICAL & SURGICAL HISTORY:  H/O: HTN (hypertension)      H/O: hysterectomy      History of knee replacement      History of total shoulder replacement          FAMILY HISTORY:      ALLERGIES: sulfa drugs (Rash)      CURRENT MEDICATIONS  artificial  tears Solution 1 Drop(s) Both EYES three times a day  aspirin enteric coated 81 milliGRAM(s) Oral daily  atorvastatin 20 milliGRAM(s) Oral at bedtime  diclofenac 75 milliGRAM(s) Oral daily  metoprolol tartrate 12.5 milliGRAM(s) Oral two times a day  misoprostol 200 MICROGram(s) Oral <User Schedule>  oxybutynin 10 milliGRAM(s) Oral two times a day  potassium chloride    Tablet ER 20 milliEquivalent(s) Oral every 2 hours  senna 2 Tablet(s) Oral at bedtime  sodium chloride 2% Ophthalmic Solution 1 Drop(s) Both EYES daily  venlafaxine XR. 150 milliGRAM(s) Oral daily  zolpidem 5 milliGRAM(s) Oral at bedtime PRN    -----------------------------------------------------------------------------------    VITAL SIGNS:  T(C): 37.1 (01-02-24 @ 09:20), Max: 37.1 (01-02-24 @ 09:20)  HR: 76 (01-02-24 @ 09:20) (76 - 135)  BP: 116/66 (01-02-24 @ 09:20)  RR: 17 (01-02-24 @ 09:20) (17 - 22)  SpO2: 98% (01-02-24 @ 09:20) (95% - 99%)    Weight (kg): 72.5 (01-01-24 @ 17:08)    PHYSICAL EXAM:  ***  General: NAD  HEENT: NC/AT; Normal inspection of eyes and nose; Moist mucous membranes, no oral lesions  Neck: Soft, supple, full ROM. No cervical or paraspinal tenderness.   Cardio: RRR.   Chest: Good effort, CTAB. No chest wall tenderness.  GI/Abd: Soft, NT/ND.  Vascular: Extremities warm; B/L UE and LE pulses 2+  Skin: No rashes; Normal color  Musculoskeletal: All 4 extremities moving spontaneously, no limitations. Full ROM of shoulders, elbows, wrists, fingers, knees, ankles bilaterally. No tenderness to palpation of joints or extremities.  Neuro: Strength 5/5 in B/L UE/LE. Sensation to light touch intact in B/L UE/LE.                CRANIAL NERVES: I - olfactory intact. II - normal visual acuity testing with Snellen. III/IV/VI - EOM's intact, painless. V - Normal sensation throughout 3 branches. VII - Normal and symmetric eyebrow raise; cheek puff symmetric; normal and symmetric smile; Normal strength with eye closing b/l. VIII - Hearing intact to whisper. IX/X - Normal palate rise, + gag reflex. XI - normal shoulder shrug, neck flexion & lateral rotation. XII - Normal and symmetric tongue protrusion.      LABS:      MICROBIOLOGY:      ------------------------------------------------------------------------------------------  RADIOLOGICAL FINDINGS REVIEW:    CXR:   ******PRELIMINARY REPORT******      ******PRELIMINARY REPORT******         ACC: [id number] EXAM:  XR CHEST PORTABLE ROUTINE 1V   ORDERED BY:  RAFAEL MARTINEZ     PROCEDURE DATE:  01/01/2024    ******PRELIMINARY REPORT******      ******PRELIMINARY REPORT******           INTERPRETATION:  no focal airspace opacity.        ******PRELIMINARY REPORT******      ******PRELIMINARY REPORT******     Pelvis Films:    C-Spine Films: < from: CT Head No Cont (01.01.24 @ 21:05) >      ACC: [id number] EXAM:  CT BRAIN   ORDERED BY:  SILVA ROBERT     PROCEDURE DATE:  01/01/2024          INTERPRETATION:  CLINICAL INFORMATION: Fall    TECHNIQUE: Multiple axial CT images of the calvarium and brain were   obtained without contrast. Sagittal and coronal reformats were obtained.    COMPARISON: CT head 12/28/2023.    FINDINGS:    There is no mass effect, intracranial hemorrhage, or midline shift.    There is no CT evidence of acute territorial infarct.    The ventricles and sulci are appropriate in size and configuration for   patient's stated age.    The imaged portions of the paranasal sinuses and mastoids are well   aerated.    There is no osseous abnormality.  Bilateral lens replacement.    IMPRESSION:    No intracranial hemorrhage, mass effect, or midline shift.    --- End of Report ---        < end of copied text >      T/L/S Spine Films:   Extremity Films:   Head CT:   C-Spine CT:   Neck CT:   Chest CT/ABD/Pelvis CT:   < from: CT Abdomen and Pelvis w/ IV Cont (01.01.24 @ 21:06) >      ACC: [id number] EXAM:  CT ABDOMEN AND PELVIS IC   ORDERED BY:  RAFAEL MARTINEZ     ACC: [id number] EXAM:  CT CHEST IC   ORDERED BY:  RAFAEL MARTINEZ     PROCEDURE DATE:  01/01/2024          INTERPRETATION:  CLINICAL INFORMATION: Fall on 12/20/2023, generalized   weakness    COMPARISON: None.    CONTRAST/COMPLICATIONS:  IV Contrast: Omnipaque 350 (accession [id number]), IV contrast documented   in unlinked concurrent exam (accession [id number])  90 cc administered   10   cc discarded  Oral Contrast: NONE  Complications: None reported at time of study completion    PROCEDURE:  CT of the Chest, Abdomen and Pelvis was performed.  Imaging was performed through the chest in the arterial phase followed by   imaging of the abdomen and pelvis in the portal venousphase.  Sagittal and coronal reformats were performed.    FINDINGS:  CHEST:  LUNGS AND LARGE AIRWAYS: Patent central airways. No pulmonary nodules.  PLEURA: Small right and trace left pleural effusions. Mild bibasilar   atelectasis.  VESSELS: Atherosclerotic changes of the aorta and coronary arteries.  HEART: Cardiomegaly. No pericardial effusion. Dense mitral annulus   calcifications.  MEDIASTINUM AND LAZARO: No lymphadenopathy.  CHEST WALL AND LOWER NECK: Within normal limits.    ABDOMEN AND PELVIS:  LIVER: Within normal limits.  BILE DUCTS: Normal caliber.  GALLBLADDER: Within normal limits.  SPLEEN: Within normal limits.  PANCREAS: Atrophic.  ADRENALS: Within normal limits.  KIDNEYS/URETERS: Mild left hydronephrosis. No nephrolithiasis.    BLADDER: Within normal limits.  REPRODUCTIVE ORGANS: Hysterectomy.    BOWEL: No bowel obstruction. Status post gastric sleeve. Small hiatal   hernia. Status post hemicolectomy. Appendix is normal.  PERITONEUM: No ascites.  VESSELS: Atherosclerotic changes.  RETROPERITONEUM/LYMPH NODES: No lymphadenopathy.  ABDOMINAL WALL: Postsurgical changes.  6.4 x 4.8 x 11.4 cm hematoma within the subcutaneous tissues adjacent to   the right trochanter.  BONES: Severe degenerative changes. Lumbar levoscoliosis. Grade 1   anterolisthesis of L4 on L5. Bilateral shoulder prostheses.    IMPRESSION:    Small right and trace left pleural effusions.    6.4 x 4.8 x 11.4 cm hematoma within the subcutaneous tissues adjacent to   the right trochanter.    < end of copied text >      Other:   < from: CT Lower Extremity w/ IV Cont, Right (01.01.24 @ 21:23) >      ACC: [id number] EXAM:  CT LWR EXT IC RT   ORDERED BY:  RAFAEL MARTINEZ     PROCEDURE DATE:  01/01/2024          INTERPRETATION:  CT RIGHT FEMUR WITH IV CONTRAST    HISTORY: Fall 4 days ago with extensive ecchymosis.    TECHNIQUE: Contiguous axial imaging was performed through the right femur   to the mid tibia with 90 cc Omnipaque 350 intravenous contrast. Coronal   and sagittal reformatting was utilized.    COMPARISON:  Concurrent CT of the chest abdomen and pelvis. Lower   extremity venous ultrasound dated 12/29/2023.    FINDINGS    OSSEOUS STRUCTURES    Fractures:  Slightly displaced sagittal plane fracture involves the   medial patellar facet with tiny comminuted fragments. Age is   indeterminate although sclerosis may suggest a chronic injury.   Suprapatellar enthesophytes are noted.    VISUALIZED PELVIC JOINTS    Visualized Sacroiliac Joint:  Mild arthrosis is present bilaterally.    Symphysis Pubis:  Mild arthrosis is present.    HIP JOINT    Avascular Necrosis: None.    Joint Space: Maintained.    Effusion/Synovitis:  None.    RIGHT KNEE JOINTS    Postoperative Changes: Total knee arthroplasty is present. No   periprosthetic lucency is seen. No patellar component is seen and there   is concave remodeling of the patella in addition to the fracture.    SOFT TISSUES    Neurovascular:  Mild atherosclerotic ossifications are present.    Pelvis/Abdomen:  Sigmoid anastomotic surgical material is noted.   Patient is status post hysterectomy.    Musculature:  There is mild muscle atrophy.    Subcutaneous Tissues:  Subcutaneous hematoma is present overlying the   right greater trochanter measuring up to approximately 4.8 x 7.5 x 12.3   cm (AP x TR x CC). Mild to moderate subcutaneous edema is present.    IMPRESSION:  1.  Slightly displaced sagittal plane fracture involves the medial   patellar facet of indeterminate age although sclerosis may suggest a   chronic injury. Correlation with site of pain is suggested.  2.  Subcutaneous hematoma overlies the right greater trochanter measuring   approximately 4.8 x 7.5 x 12.3 cm.    < end of copied text >      List Injuries Identified to Date:    Unspecified atrial fibrillation    Deep hematoma    Prophylactic measure    Paroxysmal atrial fibrillation    Fall in home        List Operative and Interventional Radiological Procedures:     Consults (Date):  [x] cardiology/medicine  [] Neurosurgery   [] Orthopedic Surgery  [] Spine Surgery  [] Plastic Surgery  [] ENT  [] Urology  [] PM&R  [] Social Work    INTERPRETATION/ASSESSMENT:   KAPIL GRIJALVA is a 83y Female who required a tertiary survey due to __.    PLAN:   - anticoagulation per medicine/cardiology team  - monitor H/h and hematoma for bleeding/increase in size  - please ensure patient has appropriate follow up for any incidental findings on trauma scans: patellar facet fracture of indeterminate age, lumbar anterosclerosis  - recall trauma surgery as needed with further questions    Trauma 9032 TERTIARY TRAUMA SURVEY  ------------------------------------------------------------------------------------    Date of TTS:   Time:   Admit Date:    Trauma Activation:   Admit GCS:     HPI:  83 year old female w/ hx of HTN, GERD, diverticulosis c/b abscess requiring hemicolectomy further c/b empyema requiring thoracotomy and HLD presenting via EMS for episode of diaphoresis and generalized weakness. Of note, recent admission for unwitnessed mechanical fall w/ head strike. Able to ambulate after the fall. Underwent TTE showing insignificant findings. Carotid dopplers w/o stenosis and lower extremity without DVT. Discharged with negative workup with follow-up cardiology and neurology. She lives alone and normally does not use assisted devices at home.     She mentions that following discharge, she has another fall. She denies loss of consciousness, prodromal symptoms, seizure like activity or urinary/bowel incontinence. She mentions that she felt her feet trip over each other and she went down, denies head trauma. Was able to get back up without much effort and denies confusion or lack of awareness following event. She presents today due to weakness when she woke up 1/1/24 AM. Denies headaches, dizziness, or other symptoms. No fever, chills. Thought it would be good to come in.     In ED, found to be tachycardic to 130s with atrial fibrillation, BP stable. Labs significant for hg to 7.8. CT imaging w/ findings of small right and trace left pleural effusions. Subcutaneous hematoma in the proximal right thigh.  (02 Jan 2024 02:19)      INTERVAL EVENTS:   - found to have new onset afib and cardiology consulted, admitted to medicine for syncope workup     PAST MEDICAL & SURGICAL HISTORY:  H/O: HTN (hypertension)      H/O: hysterectomy      History of knee replacement      History of total shoulder replacement          FAMILY HISTORY:      ALLERGIES: sulfa drugs (Rash)      CURRENT MEDICATIONS  artificial  tears Solution 1 Drop(s) Both EYES three times a day  aspirin enteric coated 81 milliGRAM(s) Oral daily  atorvastatin 20 milliGRAM(s) Oral at bedtime  diclofenac 75 milliGRAM(s) Oral daily  metoprolol tartrate 12.5 milliGRAM(s) Oral two times a day  misoprostol 200 MICROGram(s) Oral <User Schedule>  oxybutynin 10 milliGRAM(s) Oral two times a day  potassium chloride    Tablet ER 20 milliEquivalent(s) Oral every 2 hours  senna 2 Tablet(s) Oral at bedtime  sodium chloride 2% Ophthalmic Solution 1 Drop(s) Both EYES daily  venlafaxine XR. 150 milliGRAM(s) Oral daily  zolpidem 5 milliGRAM(s) Oral at bedtime PRN    -----------------------------------------------------------------------------------    VITAL SIGNS:  T(C): 37.1 (01-02-24 @ 09:20), Max: 37.1 (01-02-24 @ 09:20)  HR: 76 (01-02-24 @ 09:20) (76 - 135)  BP: 116/66 (01-02-24 @ 09:20)  RR: 17 (01-02-24 @ 09:20) (17 - 22)  SpO2: 98% (01-02-24 @ 09:20) (95% - 99%)    Weight (kg): 72.5 (01-01-24 @ 17:08)    PHYSICAL EXAM:  ***  General: NAD  HEENT: NC/AT; Normal inspection of eyes and nose; Moist mucous membranes, no oral lesions  Neck: Soft, supple, full ROM. No cervical or paraspinal tenderness.   Cardio: RRR.   Chest: Good effort, CTAB. No chest wall tenderness.  GI/Abd: Soft, NT/ND.  Vascular: Extremities warm; B/L UE and LE pulses 2+  Skin: No rashes; Normal color  Musculoskeletal: All 4 extremities moving spontaneously, no limitations. Full ROM of shoulders, elbows, wrists, fingers, knees, ankles bilaterally. No tenderness to palpation of joints or extremities.  Neuro: Strength 5/5 in B/L UE/LE. Sensation to light touch intact in B/L UE/LE.                CRANIAL NERVES: I - olfactory intact. II - normal visual acuity testing with Snellen. III/IV/VI - EOM's intact, painless. V - Normal sensation throughout 3 branches. VII - Normal and symmetric eyebrow raise; cheek puff symmetric; normal and symmetric smile; Normal strength with eye closing b/l. VIII - Hearing intact to whisper. IX/X - Normal palate rise, + gag reflex. XI - normal shoulder shrug, neck flexion & lateral rotation. XII - Normal and symmetric tongue protrusion.      LABS:      MICROBIOLOGY:      ------------------------------------------------------------------------------------------  RADIOLOGICAL FINDINGS REVIEW:    CXR:   ******PRELIMINARY REPORT******      ******PRELIMINARY REPORT******         ACC: [id number] EXAM:  XR CHEST PORTABLE ROUTINE 1V   ORDERED BY:  RAFAEL MARTINEZ     PROCEDURE DATE:  01/01/2024    ******PRELIMINARY REPORT******      ******PRELIMINARY REPORT******           INTERPRETATION:  no focal airspace opacity.        ******PRELIMINARY REPORT******      ******PRELIMINARY REPORT******     Pelvis Films:    C-Spine Films: < from: CT Head No Cont (01.01.24 @ 21:05) >      ACC: [id number] EXAM:  CT BRAIN   ORDERED BY:  SILVA ROBERT     PROCEDURE DATE:  01/01/2024          INTERPRETATION:  CLINICAL INFORMATION: Fall    TECHNIQUE: Multiple axial CT images of the calvarium and brain were   obtained without contrast. Sagittal and coronal reformats were obtained.    COMPARISON: CT head 12/28/2023.    FINDINGS:    There is no mass effect, intracranial hemorrhage, or midline shift.    There is no CT evidence of acute territorial infarct.    The ventricles and sulci are appropriate in size and configuration for   patient's stated age.    The imaged portions of the paranasal sinuses and mastoids are well   aerated.    There is no osseous abnormality.  Bilateral lens replacement.    IMPRESSION:    No intracranial hemorrhage, mass effect, or midline shift.    --- End of Report ---        < end of copied text >      T/L/S Spine Films:   Extremity Films:   Head CT:   C-Spine CT:   Neck CT:   Chest CT/ABD/Pelvis CT:   < from: CT Abdomen and Pelvis w/ IV Cont (01.01.24 @ 21:06) >      ACC: [id number] EXAM:  CT ABDOMEN AND PELVIS IC   ORDERED BY:  RAFAEL MARTINEZ     ACC: [id number] EXAM:  CT CHEST IC   ORDERED BY:  RAFAEL MARTINEZ     PROCEDURE DATE:  01/01/2024          INTERPRETATION:  CLINICAL INFORMATION: Fall on 12/20/2023, generalized   weakness    COMPARISON: None.    CONTRAST/COMPLICATIONS:  IV Contrast: Omnipaque 350 (accession [id number]), IV contrast documented   in unlinked concurrent exam (accession [id number])  90 cc administered   10   cc discarded  Oral Contrast: NONE  Complications: None reported at time of study completion    PROCEDURE:  CT of the Chest, Abdomen and Pelvis was performed.  Imaging was performed through the chest in the arterial phase followed by   imaging of the abdomen and pelvis in the portal venousphase.  Sagittal and coronal reformats were performed.    FINDINGS:  CHEST:  LUNGS AND LARGE AIRWAYS: Patent central airways. No pulmonary nodules.  PLEURA: Small right and trace left pleural effusions. Mild bibasilar   atelectasis.  VESSELS: Atherosclerotic changes of the aorta and coronary arteries.  HEART: Cardiomegaly. No pericardial effusion. Dense mitral annulus   calcifications.  MEDIASTINUM AND LAZARO: No lymphadenopathy.  CHEST WALL AND LOWER NECK: Within normal limits.    ABDOMEN AND PELVIS:  LIVER: Within normal limits.  BILE DUCTS: Normal caliber.  GALLBLADDER: Within normal limits.  SPLEEN: Within normal limits.  PANCREAS: Atrophic.  ADRENALS: Within normal limits.  KIDNEYS/URETERS: Mild left hydronephrosis. No nephrolithiasis.    BLADDER: Within normal limits.  REPRODUCTIVE ORGANS: Hysterectomy.    BOWEL: No bowel obstruction. Status post gastric sleeve. Small hiatal   hernia. Status post hemicolectomy. Appendix is normal.  PERITONEUM: No ascites.  VESSELS: Atherosclerotic changes.  RETROPERITONEUM/LYMPH NODES: No lymphadenopathy.  ABDOMINAL WALL: Postsurgical changes.  6.4 x 4.8 x 11.4 cm hematoma within the subcutaneous tissues adjacent to   the right trochanter.  BONES: Severe degenerative changes. Lumbar levoscoliosis. Grade 1   anterolisthesis of L4 on L5. Bilateral shoulder prostheses.    IMPRESSION:    Small right and trace left pleural effusions.    6.4 x 4.8 x 11.4 cm hematoma within the subcutaneous tissues adjacent to   the right trochanter.    < end of copied text >      Other:   < from: CT Lower Extremity w/ IV Cont, Right (01.01.24 @ 21:23) >      ACC: [id number] EXAM:  CT LWR EXT IC RT   ORDERED BY:  RAFAEL MARTINEZ     PROCEDURE DATE:  01/01/2024          INTERPRETATION:  CT RIGHT FEMUR WITH IV CONTRAST    HISTORY: Fall 4 days ago with extensive ecchymosis.    TECHNIQUE: Contiguous axial imaging was performed through the right femur   to the mid tibia with 90 cc Omnipaque 350 intravenous contrast. Coronal   and sagittal reformatting was utilized.    COMPARISON:  Concurrent CT of the chest abdomen and pelvis. Lower   extremity venous ultrasound dated 12/29/2023.    FINDINGS    OSSEOUS STRUCTURES    Fractures:  Slightly displaced sagittal plane fracture involves the   medial patellar facet with tiny comminuted fragments. Age is   indeterminate although sclerosis may suggest a chronic injury.   Suprapatellar enthesophytes are noted.    VISUALIZED PELVIC JOINTS    Visualized Sacroiliac Joint:  Mild arthrosis is present bilaterally.    Symphysis Pubis:  Mild arthrosis is present.    HIP JOINT    Avascular Necrosis: None.    Joint Space: Maintained.    Effusion/Synovitis:  None.    RIGHT KNEE JOINTS    Postoperative Changes: Total knee arthroplasty is present. No   periprosthetic lucency is seen. No patellar component is seen and there   is concave remodeling of the patella in addition to the fracture.    SOFT TISSUES    Neurovascular:  Mild atherosclerotic ossifications are present.    Pelvis/Abdomen:  Sigmoid anastomotic surgical material is noted.   Patient is status post hysterectomy.    Musculature:  There is mild muscle atrophy.    Subcutaneous Tissues:  Subcutaneous hematoma is present overlying the   right greater trochanter measuring up to approximately 4.8 x 7.5 x 12.3   cm (AP x TR x CC). Mild to moderate subcutaneous edema is present.    IMPRESSION:  1.  Slightly displaced sagittal plane fracture involves the medial   patellar facet of indeterminate age although sclerosis may suggest a   chronic injury. Correlation with site of pain is suggested.  2.  Subcutaneous hematoma overlies the right greater trochanter measuring   approximately 4.8 x 7.5 x 12.3 cm.    < end of copied text >      List Injuries Identified to Date:    Unspecified atrial fibrillation    Deep hematoma    Prophylactic measure    Paroxysmal atrial fibrillation    Fall in home        List Operative and Interventional Radiological Procedures:     Consults (Date):  [x] cardiology/medicine  [] Neurosurgery   [] Orthopedic Surgery  [] Spine Surgery  [] Plastic Surgery  [] ENT  [] Urology  [] PM&R  [] Social Work    INTERPRETATION/ASSESSMENT:   KAPIL GRIJALVA is a 83y Female who required a tertiary survey due to __.    PLAN:   - anticoagulation per medicine/cardiology team  - monitor H/h and hematoma for bleeding/increase in size  - please ensure patient has appropriate follow up for any incidental findings on trauma scans: patellar facet fracture of indeterminate age, lumbar anterosclerosis  - recall trauma surgery as needed with further questions    Trauma 9079 TERTIARY TRAUMA SURVEY  ------------------------------------------------------------------------------------    Date of TTS:   Time:   Admit Date:    Trauma Activation:   Admit GCS:     HPI:  83 year old female w/ hx of HTN, GERD, diverticulosis c/b abscess requiring hemicolectomy further c/b empyema requiring thoracotomy and HLD presenting via EMS for episode of diaphoresis and generalized weakness. Of note, recent admission for unwitnessed mechanical fall w/ head strike. Able to ambulate after the fall. Underwent TTE showing insignificant findings. Carotid dopplers w/o stenosis and lower extremity without DVT. Discharged with negative workup with follow-up cardiology and neurology. She lives alone and normally does not use assisted devices at home.     She mentions that following discharge, she has another fall. She denies loss of consciousness, prodromal symptoms, seizure like activity or urinary/bowel incontinence. She mentions that she felt her feet trip over each other and she went down, denies head trauma. Was able to get back up without much effort and denies confusion or lack of awareness following event. She presents today due to weakness when she woke up 1/1/24 AM. Denies headaches, dizziness, or other symptoms. No fever, chills. Thought it would be good to come in.     In ED, found to be tachycardic to 130s with atrial fibrillation, BP stable. Labs significant for hg to 7.8. CT imaging w/ findings of small right and trace left pleural effusions. Subcutaneous hematoma in the proximal right thigh.  (02 Jan 2024 02:19)      INTERVAL EVENTS:   - found to have new onset afib and cardiology consulted, admitted to medicine for syncope workup     PAST MEDICAL & SURGICAL HISTORY:  H/O: HTN (hypertension)      H/O: hysterectomy      History of knee replacement      History of total shoulder replacement          FAMILY HISTORY:      ALLERGIES: sulfa drugs (Rash)      CURRENT MEDICATIONS  artificial  tears Solution 1 Drop(s) Both EYES three times a day  aspirin enteric coated 81 milliGRAM(s) Oral daily  atorvastatin 20 milliGRAM(s) Oral at bedtime  diclofenac 75 milliGRAM(s) Oral daily  metoprolol tartrate 12.5 milliGRAM(s) Oral two times a day  misoprostol 200 MICROGram(s) Oral <User Schedule>  oxybutynin 10 milliGRAM(s) Oral two times a day  potassium chloride    Tablet ER 20 milliEquivalent(s) Oral every 2 hours  senna 2 Tablet(s) Oral at bedtime  sodium chloride 2% Ophthalmic Solution 1 Drop(s) Both EYES daily  venlafaxine XR. 150 milliGRAM(s) Oral daily  zolpidem 5 milliGRAM(s) Oral at bedtime PRN    -----------------------------------------------------------------------------------    VITAL SIGNS:  T(C): 37.1 (01-02-24 @ 09:20), Max: 37.1 (01-02-24 @ 09:20)  HR: 76 (01-02-24 @ 09:20) (76 - 135)  BP: 116/66 (01-02-24 @ 09:20)  RR: 17 (01-02-24 @ 09:20) (17 - 22)  SpO2: 98% (01-02-24 @ 09:20) (95% - 99%)    Weight (kg): 72.5 (01-01-24 @ 17:08)    PHYSICAL EXAM:   General: NAD  HEENT: NC/AT; Normal inspection of eyes and nose; Moist mucous membranes, no oral lesions  Neck: Soft, supple, full ROM. No cervical or paraspinal tenderness.   Cardio: RRR.   Chest: Good effort, CTAB. No chest wall tenderness.  GI/Abd: Soft, NT/ND.  Vascular: Extremities warm; B/L UE and LE pulses 2+  Skin: No rashes; Ecchymosis extending from lateral glut to posterior right thigh   Musculoskeletal: All 4 extremities moving spontaneously, no limitations. Full ROM of shoulders, elbows, wrists, fingers, knees, ankles bilaterally. No tenderness to palpation of joints or extremities. Pain on palpation of forearm, no bony deformity.   Neuro: Strength 5/5 in B/L UE/LE. Sensation to light touch intact in B/L UE/LE.                CRANIAL NERVES:  III/IV/VI - EOM's intact, painless. V - Normal sensation throughout 3 branches. VII - Normal and symmetric eyebrow raise; cheek puff symmetric; normal and symmetric smile. VIII - Hearing intact . XI - normal shoulder shrug, neck flexion & lateral rotation. XII - Normal and symmetric tongue protrusion.      LABS:      MICROBIOLOGY:      ------------------------------------------------------------------------------------------  RADIOLOGICAL FINDINGS REVIEW:    CXR:   ******PRELIMINARY REPORT******      ******PRELIMINARY REPORT******         ACC: [id number] EXAM:  XR CHEST PORTABLE ROUTINE 1V   ORDERED BY:  RAFAEL MARTINEZ     PROCEDURE DATE:  01/01/2024    ******PRELIMINARY REPORT******      ******PRELIMINARY REPORT******           INTERPRETATION:  no focal airspace opacity.        ******PRELIMINARY REPORT******      ******PRELIMINARY REPORT******     Pelvis Films:    C-Spine Films: < from: CT Head No Cont (01.01.24 @ 21:05) >      ACC: [id number] EXAM:  CT BRAIN   ORDERED BY:  SILVA ROBERT     PROCEDURE DATE:  01/01/2024          INTERPRETATION:  CLINICAL INFORMATION: Fall    TECHNIQUE: Multiple axial CT images of the calvarium and brain were   obtained without contrast. Sagittal and coronal reformats were obtained.    COMPARISON: CT head 12/28/2023.    FINDINGS:    There is no mass effect, intracranial hemorrhage, or midline shift.    There is no CT evidence of acute territorial infarct.    The ventricles and sulci are appropriate in size and configuration for   patient's stated age.    The imaged portions of the paranasal sinuses and mastoids are well   aerated.    There is no osseous abnormality.  Bilateral lens replacement.    IMPRESSION:    No intracranial hemorrhage, mass effect, or midline shift.    --- End of Report ---        < end of copied text >      T/L/S Spine Films:   Extremity Films:   Head CT:   C-Spine CT:   Neck CT:   Chest CT/ABD/Pelvis CT:   < from: CT Abdomen and Pelvis w/ IV Cont (01.01.24 @ 21:06) >      ACC: [id number] EXAM:  CT ABDOMEN AND PELVIS IC   ORDERED BY:  RAFAEL MARTINEZ     ACC: [id number] EXAM:  CT CHEST IC   ORDERED BY:  RAFAEL MARTINEZ     PROCEDURE DATE:  01/01/2024          INTERPRETATION:  CLINICAL INFORMATION: Fall on 12/20/2023, generalized   weakness    COMPARISON: None.    CONTRAST/COMPLICATIONS:  IV Contrast: Omnipaque 350 (accession [id number]), IV contrast documented   in unlinked concurrent exam (accession [id number])  90 cc administered   10   cc discarded  Oral Contrast: NONE  Complications: None reported at time of study completion    PROCEDURE:  CT of the Chest, Abdomen and Pelvis was performed.  Imaging was performed through the chest in the arterial phase followed by   imaging of the abdomen and pelvis in the portal venousphase.  Sagittal and coronal reformats were performed.    FINDINGS:  CHEST:  LUNGS AND LARGE AIRWAYS: Patent central airways. No pulmonary nodules.  PLEURA: Small right and trace left pleural effusions. Mild bibasilar   atelectasis.  VESSELS: Atherosclerotic changes of the aorta and coronary arteries.  HEART: Cardiomegaly. No pericardial effusion. Dense mitral annulus   calcifications.  MEDIASTINUM AND LAZARO: No lymphadenopathy.  CHEST WALL AND LOWER NECK: Within normal limits.    ABDOMEN AND PELVIS:  LIVER: Within normal limits.  BILE DUCTS: Normal caliber.  GALLBLADDER: Within normal limits.  SPLEEN: Within normal limits.  PANCREAS: Atrophic.  ADRENALS: Within normal limits.  KIDNEYS/URETERS: Mild left hydronephrosis. No nephrolithiasis.    BLADDER: Within normal limits.  REPRODUCTIVE ORGANS: Hysterectomy.    BOWEL: No bowel obstruction. Status post gastric sleeve. Small hiatal   hernia. Status post hemicolectomy. Appendix is normal.  PERITONEUM: No ascites.  VESSELS: Atherosclerotic changes.  RETROPERITONEUM/LYMPH NODES: No lymphadenopathy.  ABDOMINAL WALL: Postsurgical changes.  6.4 x 4.8 x 11.4 cm hematoma within the subcutaneous tissues adjacent to   the right trochanter.  BONES: Severe degenerative changes. Lumbar levoscoliosis. Grade 1   anterolisthesis of L4 on L5. Bilateral shoulder prostheses.    IMPRESSION:    Small right and trace left pleural effusions.    6.4 x 4.8 x 11.4 cm hematoma within the subcutaneous tissues adjacent to   the right trochanter.    < end of copied text >      Other:   < from: CT Lower Extremity w/ IV Cont, Right (01.01.24 @ 21:23) >      ACC: [id number] EXAM:  CT LWR EXT IC RT   ORDERED BY:  RAFAEL MARTINEZ     PROCEDURE DATE:  01/01/2024          INTERPRETATION:  CT RIGHT FEMUR WITH IV CONTRAST    HISTORY: Fall 4 days ago with extensive ecchymosis.    TECHNIQUE: Contiguous axial imaging was performed through the right femur   to the mid tibia with 90 cc Omnipaque 350 intravenous contrast. Coronal   and sagittal reformatting was utilized.    COMPARISON:  Concurrent CT of the chest abdomen and pelvis. Lower   extremity venous ultrasound dated 12/29/2023.    FINDINGS    OSSEOUS STRUCTURES    Fractures:  Slightly displaced sagittal plane fracture involves the   medial patellar facet with tiny comminuted fragments. Age is   indeterminate although sclerosis may suggest a chronic injury.   Suprapatellar enthesophytes are noted.    VISUALIZED PELVIC JOINTS    Visualized Sacroiliac Joint:  Mild arthrosis is present bilaterally.    Symphysis Pubis:  Mild arthrosis is present.    HIP JOINT    Avascular Necrosis: None.    Joint Space: Maintained.    Effusion/Synovitis:  None.    RIGHT KNEE JOINTS    Postoperative Changes: Total knee arthroplasty is present. No   periprosthetic lucency is seen. No patellar component is seen and there   is concave remodeling of the patella in addition to the fracture.    SOFT TISSUES    Neurovascular:  Mild atherosclerotic ossifications are present.    Pelvis/Abdomen:  Sigmoid anastomotic surgical material is noted.   Patient is status post hysterectomy.    Musculature:  There is mild muscle atrophy.    Subcutaneous Tissues:  Subcutaneous hematoma is present overlying the   right greater trochanter measuring up to approximately 4.8 x 7.5 x 12.3   cm (AP x TR x CC). Mild to moderate subcutaneous edema is present.    IMPRESSION:  1.  Slightly displaced sagittal plane fracture involves the medial   patellar facet of indeterminate age although sclerosis may suggest a   chronic injury. Correlation with site of pain is suggested.  2.  Subcutaneous hematoma overlies the right greater trochanter measuring   approximately 4.8 x 7.5 x 12.3 cm.    < end of copied text >      List Injuries Identified to Date:    Unspecified atrial fibrillation    Deep hematoma    Prophylactic measure    Paroxysmal atrial fibrillation    Fall in home        List Operative and Interventional Radiological Procedures:     Consults (Date):  [x] cardiology/medicine  [] Neurosurgery   [] Orthopedic Surgery  [] Spine Surgery  [] Plastic Surgery  [] ENT  [] Urology  [] PM&R  [] Social Work    INTERPRETATION/ASSESSMENT:   KAPIL GRIJALVA is a 83y Female who required a tertiary survey due to h/o of fall with no LOC who presented with weakness. Tertiary survey performed showing no additional injuries or deficits.     PLAN:   - anticoagulation per medicine/cardiology team  - monitor H/h and hematoma for bleeding/increase in size  - please ensure patient has appropriate follow up for any incidental findings on trauma scans: patellar facet fracture of indeterminate age, lumbar anterosclerosis  - recall trauma surgery as needed with further questions    Trauma 9063 TERTIARY TRAUMA SURVEY  ------------------------------------------------------------------------------------    Date of TTS:   Time:   Admit Date:    Trauma Activation:   Admit GCS:     HPI:  83 year old female w/ hx of HTN, GERD, diverticulosis c/b abscess requiring hemicolectomy further c/b empyema requiring thoracotomy and HLD presenting via EMS for episode of diaphoresis and generalized weakness. Of note, recent admission for unwitnessed mechanical fall w/ head strike. Able to ambulate after the fall. Underwent TTE showing insignificant findings. Carotid dopplers w/o stenosis and lower extremity without DVT. Discharged with negative workup with follow-up cardiology and neurology. She lives alone and normally does not use assisted devices at home.     She mentions that following discharge, she has another fall. She denies loss of consciousness, prodromal symptoms, seizure like activity or urinary/bowel incontinence. She mentions that she felt her feet trip over each other and she went down, denies head trauma. Was able to get back up without much effort and denies confusion or lack of awareness following event. She presents today due to weakness when she woke up 1/1/24 AM. Denies headaches, dizziness, or other symptoms. No fever, chills. Thought it would be good to come in.     In ED, found to be tachycardic to 130s with atrial fibrillation, BP stable. Labs significant for hg to 7.8. CT imaging w/ findings of small right and trace left pleural effusions. Subcutaneous hematoma in the proximal right thigh.  (02 Jan 2024 02:19)      INTERVAL EVENTS:   - found to have new onset afib and cardiology consulted, admitted to medicine for syncope workup     PAST MEDICAL & SURGICAL HISTORY:  H/O: HTN (hypertension)      H/O: hysterectomy      History of knee replacement      History of total shoulder replacement          FAMILY HISTORY:      ALLERGIES: sulfa drugs (Rash)      CURRENT MEDICATIONS  artificial  tears Solution 1 Drop(s) Both EYES three times a day  aspirin enteric coated 81 milliGRAM(s) Oral daily  atorvastatin 20 milliGRAM(s) Oral at bedtime  diclofenac 75 milliGRAM(s) Oral daily  metoprolol tartrate 12.5 milliGRAM(s) Oral two times a day  misoprostol 200 MICROGram(s) Oral <User Schedule>  oxybutynin 10 milliGRAM(s) Oral two times a day  potassium chloride    Tablet ER 20 milliEquivalent(s) Oral every 2 hours  senna 2 Tablet(s) Oral at bedtime  sodium chloride 2% Ophthalmic Solution 1 Drop(s) Both EYES daily  venlafaxine XR. 150 milliGRAM(s) Oral daily  zolpidem 5 milliGRAM(s) Oral at bedtime PRN    -----------------------------------------------------------------------------------    VITAL SIGNS:  T(C): 37.1 (01-02-24 @ 09:20), Max: 37.1 (01-02-24 @ 09:20)  HR: 76 (01-02-24 @ 09:20) (76 - 135)  BP: 116/66 (01-02-24 @ 09:20)  RR: 17 (01-02-24 @ 09:20) (17 - 22)  SpO2: 98% (01-02-24 @ 09:20) (95% - 99%)    Weight (kg): 72.5 (01-01-24 @ 17:08)    PHYSICAL EXAM:   General: NAD  HEENT: NC/AT; Normal inspection of eyes and nose; Moist mucous membranes, no oral lesions  Neck: Soft, supple, full ROM. No cervical or paraspinal tenderness.   Cardio: RRR.   Chest: Good effort, CTAB. No chest wall tenderness.  GI/Abd: Soft, NT/ND.  Vascular: Extremities warm; B/L UE and LE pulses 2+  Skin: No rashes; Ecchymosis extending from lateral glut to posterior right thigh   Musculoskeletal: All 4 extremities moving spontaneously, no limitations. Full ROM of shoulders, elbows, wrists, fingers, knees, ankles bilaterally. No tenderness to palpation of joints or extremities. Pain on palpation of forearm, no bony deformity.   Neuro: Strength 5/5 in B/L UE/LE. Sensation to light touch intact in B/L UE/LE.                CRANIAL NERVES:  III/IV/VI - EOM's intact, painless. V - Normal sensation throughout 3 branches. VII - Normal and symmetric eyebrow raise; cheek puff symmetric; normal and symmetric smile. VIII - Hearing intact . XI - normal shoulder shrug, neck flexion & lateral rotation. XII - Normal and symmetric tongue protrusion.      LABS:      MICROBIOLOGY:      ------------------------------------------------------------------------------------------  RADIOLOGICAL FINDINGS REVIEW:    CXR:   ******PRELIMINARY REPORT******      ******PRELIMINARY REPORT******         ACC: [id number] EXAM:  XR CHEST PORTABLE ROUTINE 1V   ORDERED BY:  RAFAEL MARTINEZ     PROCEDURE DATE:  01/01/2024    ******PRELIMINARY REPORT******      ******PRELIMINARY REPORT******           INTERPRETATION:  no focal airspace opacity.        ******PRELIMINARY REPORT******      ******PRELIMINARY REPORT******     Pelvis Films:    C-Spine Films: < from: CT Head No Cont (01.01.24 @ 21:05) >      ACC: [id number] EXAM:  CT BRAIN   ORDERED BY:  SILVA ROBERT     PROCEDURE DATE:  01/01/2024          INTERPRETATION:  CLINICAL INFORMATION: Fall    TECHNIQUE: Multiple axial CT images of the calvarium and brain were   obtained without contrast. Sagittal and coronal reformats were obtained.    COMPARISON: CT head 12/28/2023.    FINDINGS:    There is no mass effect, intracranial hemorrhage, or midline shift.    There is no CT evidence of acute territorial infarct.    The ventricles and sulci are appropriate in size and configuration for   patient's stated age.    The imaged portions of the paranasal sinuses and mastoids are well   aerated.    There is no osseous abnormality.  Bilateral lens replacement.    IMPRESSION:    No intracranial hemorrhage, mass effect, or midline shift.    --- End of Report ---        < end of copied text >      T/L/S Spine Films:   Extremity Films:   Head CT:   C-Spine CT:   Neck CT:   Chest CT/ABD/Pelvis CT:   < from: CT Abdomen and Pelvis w/ IV Cont (01.01.24 @ 21:06) >      ACC: [id number] EXAM:  CT ABDOMEN AND PELVIS IC   ORDERED BY:  RAFAEL MARTINEZ     ACC: [id number] EXAM:  CT CHEST IC   ORDERED BY:  RAFAEL MARTINEZ     PROCEDURE DATE:  01/01/2024          INTERPRETATION:  CLINICAL INFORMATION: Fall on 12/20/2023, generalized   weakness    COMPARISON: None.    CONTRAST/COMPLICATIONS:  IV Contrast: Omnipaque 350 (accession [id number]), IV contrast documented   in unlinked concurrent exam (accession [id number])  90 cc administered   10   cc discarded  Oral Contrast: NONE  Complications: None reported at time of study completion    PROCEDURE:  CT of the Chest, Abdomen and Pelvis was performed.  Imaging was performed through the chest in the arterial phase followed by   imaging of the abdomen and pelvis in the portal venousphase.  Sagittal and coronal reformats were performed.    FINDINGS:  CHEST:  LUNGS AND LARGE AIRWAYS: Patent central airways. No pulmonary nodules.  PLEURA: Small right and trace left pleural effusions. Mild bibasilar   atelectasis.  VESSELS: Atherosclerotic changes of the aorta and coronary arteries.  HEART: Cardiomegaly. No pericardial effusion. Dense mitral annulus   calcifications.  MEDIASTINUM AND LAZARO: No lymphadenopathy.  CHEST WALL AND LOWER NECK: Within normal limits.    ABDOMEN AND PELVIS:  LIVER: Within normal limits.  BILE DUCTS: Normal caliber.  GALLBLADDER: Within normal limits.  SPLEEN: Within normal limits.  PANCREAS: Atrophic.  ADRENALS: Within normal limits.  KIDNEYS/URETERS: Mild left hydronephrosis. No nephrolithiasis.    BLADDER: Within normal limits.  REPRODUCTIVE ORGANS: Hysterectomy.    BOWEL: No bowel obstruction. Status post gastric sleeve. Small hiatal   hernia. Status post hemicolectomy. Appendix is normal.  PERITONEUM: No ascites.  VESSELS: Atherosclerotic changes.  RETROPERITONEUM/LYMPH NODES: No lymphadenopathy.  ABDOMINAL WALL: Postsurgical changes.  6.4 x 4.8 x 11.4 cm hematoma within the subcutaneous tissues adjacent to   the right trochanter.  BONES: Severe degenerative changes. Lumbar levoscoliosis. Grade 1   anterolisthesis of L4 on L5. Bilateral shoulder prostheses.    IMPRESSION:    Small right and trace left pleural effusions.    6.4 x 4.8 x 11.4 cm hematoma within the subcutaneous tissues adjacent to   the right trochanter.    < end of copied text >      Other:   < from: CT Lower Extremity w/ IV Cont, Right (01.01.24 @ 21:23) >      ACC: [id number] EXAM:  CT LWR EXT IC RT   ORDERED BY:  RAFAEL MARTINEZ     PROCEDURE DATE:  01/01/2024          INTERPRETATION:  CT RIGHT FEMUR WITH IV CONTRAST    HISTORY: Fall 4 days ago with extensive ecchymosis.    TECHNIQUE: Contiguous axial imaging was performed through the right femur   to the mid tibia with 90 cc Omnipaque 350 intravenous contrast. Coronal   and sagittal reformatting was utilized.    COMPARISON:  Concurrent CT of the chest abdomen and pelvis. Lower   extremity venous ultrasound dated 12/29/2023.    FINDINGS    OSSEOUS STRUCTURES    Fractures:  Slightly displaced sagittal plane fracture involves the   medial patellar facet with tiny comminuted fragments. Age is   indeterminate although sclerosis may suggest a chronic injury.   Suprapatellar enthesophytes are noted.    VISUALIZED PELVIC JOINTS    Visualized Sacroiliac Joint:  Mild arthrosis is present bilaterally.    Symphysis Pubis:  Mild arthrosis is present.    HIP JOINT    Avascular Necrosis: None.    Joint Space: Maintained.    Effusion/Synovitis:  None.    RIGHT KNEE JOINTS    Postoperative Changes: Total knee arthroplasty is present. No   periprosthetic lucency is seen. No patellar component is seen and there   is concave remodeling of the patella in addition to the fracture.    SOFT TISSUES    Neurovascular:  Mild atherosclerotic ossifications are present.    Pelvis/Abdomen:  Sigmoid anastomotic surgical material is noted.   Patient is status post hysterectomy.    Musculature:  There is mild muscle atrophy.    Subcutaneous Tissues:  Subcutaneous hematoma is present overlying the   right greater trochanter measuring up to approximately 4.8 x 7.5 x 12.3   cm (AP x TR x CC). Mild to moderate subcutaneous edema is present.    IMPRESSION:  1.  Slightly displaced sagittal plane fracture involves the medial   patellar facet of indeterminate age although sclerosis may suggest a   chronic injury. Correlation with site of pain is suggested.  2.  Subcutaneous hematoma overlies the right greater trochanter measuring   approximately 4.8 x 7.5 x 12.3 cm.    < end of copied text >      List Injuries Identified to Date:    Unspecified atrial fibrillation    Deep hematoma    Prophylactic measure    Paroxysmal atrial fibrillation    Fall in home        List Operative and Interventional Radiological Procedures:     Consults (Date):  [x] cardiology/medicine  [] Neurosurgery   [] Orthopedic Surgery  [] Spine Surgery  [] Plastic Surgery  [] ENT  [] Urology  [] PM&R  [] Social Work    INTERPRETATION/ASSESSMENT:   KAPIL GRIJALVA is a 83y Female who required a tertiary survey due to h/o of fall with no LOC who presented with weakness. Tertiary survey performed showing no additional injuries or deficits.     PLAN:   - anticoagulation per medicine/cardiology team  - monitor H/h and hematoma for bleeding/increase in size  - please ensure patient has appropriate follow up for any incidental findings on trauma scans: patellar facet fracture of indeterminate age, lumbar anterosclerosis  - recall trauma surgery as needed with further questions    Trauma [id number]

## 2024-01-02 NOTE — CONSULT NOTE ADULT - SUBJECTIVE AND OBJECTIVE BOX
GENERAL SURGERY CONSULT NOTE  --------------------------------------------------------------------------------------------  HPI:    82y/o F w/ PMHx HTN, GERD, diverticulitis w/ hx of hemicolectomy d/t abscess c/b empyema requiring thoracotomy, HLD presents for diaphoresis and generalized weakness. Patient was recently admitted 23 for 2 falls and underwent  syncopal workup with no acute findings. Patient reports that these 2 falls consisted of 1 witnessed fall when she fell backwards on her buttocks and another unwitnessed where she was unsure how she got on the ground. She had another witnessed fall after dc, before feeling weak today. Denies nausea, vomiting, fevers, chills, chest pain, SOB. Endorses R hip and thigh tenderness and ecchymosis. Patient reports that the ecchymosis was present since her fall but has progressed.    In the ED, patient presented with new onset Afib w/ HR 80-130s, afebrile. Hgb was 10.3 on , now 8.5>7.8. CT w/ proximal right thigh subcutaneous hematoma 3.9 x 7 x 12.5cm that slightly abuts the deep fascial plane.      PAST MEDICAL & SURGICAL HISTORY:  H/O: HTN (hypertension)      H/O: hysterectomy      History of knee replacement      History of total shoulder replacement        FAMILY HISTORY:  [] Family history not pertinent as reviewed with the patient and family    SOCIAL HISTORY:     ALLERGIES: sulfa drugs (Rash)      HOME MEDICATIONS:    CURRENT MEDICATIONS  MEDICATIONS (STANDING):   MEDICATIONS (PRN):  --------------------------------------------------------------------------------------------    Vitals:   T(C): 36.8 (24 @ 17:38), Max: 36.8 (24 @ 17:38)  HR: 124 (24 @ 23:11) (122 - 135)  BP: 97/67 (24 @ 23:11) (97/61 - 113/90)  RR: 19 (24 23:11) (18 - 22)  SpO2: 97% (24 23:11) (95% - 99%)  CAPILLARY BLOOD GLUCOSE      POCT Blood Glucose.: 126 mg/dL (2024 17:28)      Height (cm): 152.4 ( 17:02)  Weight (kg): 72.5 (:08)  BMI (kg/m2): 31.2 (:08)  BSA (m2): 1.7 (:08)      PHYSICAL EXAM:  General: NAD, Lying in bed comfortably  Neuro: Alert and answering questions appropriately   Cardio: Afib, HR in 110s  Resp: Good effort, no signs of respiratory distress  Extremities: R hip, buttock and thigh ecchymosis, soft, mildly tender, no fluctuance, drainage or skin openings  - LLE motor and sensation intact, palpable b/l DPs  --------------------------------------------------------------------------------------------    LABS  CBC ( @ 23:32)                              7.8<L>                         7.28    )----------------(  210        69.9  % Neutrophils, 20.3  % Lymphocytes, ANC: 5.08                                24.1<L>  CBC ( @ 17:56)                              8.5<L>                         9.12    )----------------(  230        75.7  % Neutrophils, 14.3  % Lymphocytes, ANC: 6.91                                26.6<L>    BMP ( @ 17:56)             140     |  104     |  19    		Ca++ --      Ca 9.2                ---------------------------------( 114<H>		Mg 1.9                4.0     |  23      |  0.69  			Ph 3.1       LFTs ( @ 17:56)      TPro 5.9<L> / Alb 3.8 / TBili 1.6<H> / DBili -- / AST 27 / ALT 19 / AlkPhos 95            --------------------------------------------------------------------------------------------    MICROBIOLOGY  Urinalysis ( @ 20:17):     Color: Yellow / Appearance: Clear / S.012 / pH: 7.5 / Gluc: Negative / Ketones: Negative / Bili: Negative / Urobili: 2.0<!> / Protein :Negative / Nitrites: Negative / Leuk.Est: Negative / RBC:  / WBC:  / Sq Epi:  / Non Sq Epi:  / Bacteria        -> Clean Catch Clean Catch (Midstream) Culture ( @ 09:19)     NG    NG    <10,000 CFU/mL Normal Urogenital Elly      --------------------------------------------------------------------------------------------    IMAGING  < from: CT Lower Extremity w/ IV Cont, Right (24 @ 21:23) >    ******PRELIMINARY REPORT******           INTERPRETATION:  The CT images were acquired approximately eight minutes   after IV contrast injection for CTA chest performed on 2023 at 8:53   PM. Therefore evaluation for active contrast extravasation is limited.    A subcutaneous hematoma in the proximal right thigh measures   approximately 3.9 x 7 x 12.5 cm (8:65 and 9:62); this slightly abuts the   deep fascial plane.  A follow-up ultrasound is recommended in 4-6 weeks   to ensure resolution, and to exclude a closed degloving injury   (vu-fernando lesion).    < end of copied text >      --------------------------------------------------------------------------------------------

## 2024-01-02 NOTE — CONSULT NOTE ADULT - TIME BILLING
I saw and evaluated patient and agree with above note  carefully evaluate hematoma,  not concerned for expansion   I do not anticipated necrosis of skin   agree with medical evaluation as noted

## 2024-01-02 NOTE — H&P ADULT - ASSESSMENT
83 year old female w/ hx of HTN, GERD, diverticulosis c/b abscess requiring hemicolectomy further c/b empyema requiring thoracotomy and HLD presenting via EMS for episode of diaphoresis and generalized weakness found to have new onset atrial fibrillation.  83 year old female w/ hx of HTN, GERD, diverticulosis c/b abscess requiring hemicolectomy further c/b empyema requiring thoracotomy and HLD presenting via EMS for episode of diaphoresis and generalized weakness found to have new onset atrial fibrillation and R thigh hematoma.

## 2024-01-02 NOTE — H&P ADULT - PROBLEM SELECTOR PLAN 3
CT imaging showing subcutaneous hematoma in the proximal right thigh. Patient has  pain with ambulation, minimal. Etiology likely iso recent trauma 2/2 mechanical fall.   > consult: surgery   - no surgical intervention, recommend to hold AC iso dropping hgb   - no AC for atrial fibrillation

## 2024-01-02 NOTE — H&P ADULT - NSHPPHYSICALEXAM_GEN_ALL_CORE
Gen: no acute distress  HEENT: atraumatic, normocephalic, pupils equally round and reactive to light, extraocular muscles intact, no conjunctival injection  CV: regular rate and rhythm, normal S1/S2, no murmurs, rubs, or gallops  Resp: lungs clear to auscultation bilaterally, no rales, rhonchi, or wheezes  GI: soft, nontender, nondistended, BSx4  MSK: extremities atraumatic, no cyanosis or clubbing  Skin: warm, dry, no rashes or lesions  Neuro: no focal deficits, sensation grossly intact  Psych: alert and oriented x3, appropriate mood and affect Gen: no acute distress  HEENT: atraumatic, normocephalic, pupils equally round and reactive to light, extraocular muscles intact, no conjunctival injection. +ecchymosis on R temple   CV: regular rate and rhythm   Resp: lungs clear to auscultation bilaterally, no rales, rhonchi, or wheezes  GI: soft, nontender, nondistended, BSx4  MSK: extremities atraumatic, no cyanosis or clubbing  Skin: warm, dry, no rashes or lesions  Neuro: no focal deficits, sensation grossly intact  Psych: alert and oriented x3, appropriate mood and affect Gen: no acute distress  HEENT: atraumatic, normocephalic, pupils equally round and reactive to light, extraocular muscles intact, no conjunctival injection. +ecchymosis on R temple   CV: regular rate and rhythm   Resp: lungs clear to auscultation bilaterally, no rales, rhonchi, or wheezes  GI: soft, nontender, nondistended, BSx4  MSK: extremities atraumatic, no cyanosis or clubbing. +R ecchymosis on thigh   Skin: warm, dry, no rashes or lesions  Neuro: no focal deficits, sensation grossly intact  Psych: alert and oriented x3, appropriate mood and affect

## 2024-01-02 NOTE — PROGRESS NOTE ADULT - SUBJECTIVE AND OBJECTIVE BOX
Date of Service  : 01-02-24     INTERVAL HPI/OVERNIGHT EVENTS: I feel fine.   Vital Signs Last 24 Hrs  T(C): 37.1 (02 Jan 2024 09:20), Max: 37.1 (02 Jan 2024 09:20)  T(F): 98.8 (02 Jan 2024 09:20), Max: 98.8 (02 Jan 2024 09:20)  HR: 76 (02 Jan 2024 09:20) (76 - 135)  BP: 116/66 (02 Jan 2024 09:20) (97/61 - 119/64)  BP(mean): 67 (02 Jan 2024 02:23) (67 - 98)  RR: 17 (02 Jan 2024 09:20) (17 - 22)  SpO2: 98% (02 Jan 2024 09:20) (95% - 99%)    Parameters below as of 02 Jan 2024 09:20  Patient On (Oxygen Delivery Method): room air      I&O's Summary    MEDICATIONS  (STANDING):  artificial  tears Solution 1 Drop(s) Both EYES three times a day  aspirin enteric coated 81 milliGRAM(s) Oral daily  atorvastatin 20 milliGRAM(s) Oral at bedtime  diclofenac 75 milliGRAM(s) Oral daily  metoprolol tartrate 12.5 milliGRAM(s) Oral two times a day  misoprostol 200 MICROGram(s) Oral <User Schedule>  oxybutynin 10 milliGRAM(s) Oral two times a day  potassium chloride    Tablet ER 20 milliEquivalent(s) Oral every 2 hours  senna 2 Tablet(s) Oral at bedtime  sodium chloride 2% Ophthalmic Solution 1 Drop(s) Both EYES daily  venlafaxine XR. 150 milliGRAM(s) Oral daily    MEDICATIONS  (PRN):  zolpidem 5 milliGRAM(s) Oral at bedtime PRN Insomnia    LABS:                        8.6    6.77  )-----------( 222      ( 02 Jan 2024 06:56 )             26.5     01-02    142  |  105  |  17  ----------------------------<  107<H>  3.3<L>   |  25  |  0.66    Ca    8.9      02 Jan 2024 06:56  Phos  3.1     01-01  Mg     1.9     01-01    TPro  5.9<L>  /  Alb  3.8  /  TBili  1.6<H>  /  DBili  x   /  AST  27  /  ALT  19  /  AlkPhos  95  01-01    PT/INR - ( 02 Jan 2024 06:56 )   PT: 11.6 sec;   INR: 1.06 ratio         PTT - ( 02 Jan 2024 06:56 )  PTT:35.7 sec  Urinalysis Basic - ( 02 Jan 2024 06:56 )    Color: x / Appearance: x / SG: x / pH: x  Gluc: 107 mg/dL / Ketone: x  / Bili: x / Urobili: x   Blood: x / Protein: x / Nitrite: x   Leuk Esterase: x / RBC: x / WBC x   Sq Epi: x / Non Sq Epi: x / Bacteria: x      CAPILLARY BLOOD GLUCOSE      POCT Blood Glucose.: 126 mg/dL (01 Jan 2024 17:28)        Urinalysis Basic - ( 02 Jan 2024 06:56 )    Color: x / Appearance: x / SG: x / pH: x  Gluc: 107 mg/dL / Ketone: x  / Bili: x / Urobili: x   Blood: x / Protein: x / Nitrite: x   Leuk Esterase: x / RBC: x / WBC x   Sq Epi: x / Non Sq Epi: x / Bacteria: x      REVIEW OF SYSTEMS:  CONSTITUTIONAL: No fever, weight loss, or fatigue  EYES: No eye pain, visual disturbances, or discharge  ENMT:  No difficulty hearing, tinnitus, vertigo; No sinus or throat pain  NECK: No pain or stiffness  RESPIRATORY: No cough, wheezing, chills or hemoptysis; No shortness of breath  CARDIOVASCULAR: No chest pain, palpitations, dizziness, or leg swelling  GASTROINTESTINAL: No abdominal or epigastric pain. No nausea, vomiting, or hematemesis; No diarrhea or constipation. No melena or hematochezia.  GENITOURINARY: No dysuria, frequency, hematuria, or incontinence  NEUROLOGICAL: No headaches, memory loss, loss of strength, numbness, or tremors      RADIOLOGY & ADDITIONAL TESTS:    Consultant(s) Notes Reviewed:  [x ] YES  [ ] NO    PHYSICAL EXAM:  GENERAL: NAD, well-groomed, well-developed,not in any distress ,  HEAD:  Atraumatic, Normocephalic  NECK: Supple, No JVD, Normal thyroid  NERVOUS SYSTEM:  Alert & Oriented X3, No focal deficit   CHEST/LUNG: Good air entry bilateral with no  rales, rhonchi, wheezing, or rubs  HEART: Irregular rate and rhythm; No murmurs, rubs, or gallops  ABDOMEN: Soft, Nontender, Nondistended; Bowel sounds present  EXTREMITIES:  2+ Peripheral Pulses, No clubbing, cyanosis, or edema      Care Discussed with Consultants/Other Providers [ x] YES  [ ] NO

## 2024-01-02 NOTE — H&P ADULT - HISTORY OF PRESENT ILLNESS
83 year old female w/ hx of HTN, GERD, diverticulosis c/b abscess requiring hemicolectomy further c/b empyema requiring thoracotomy and HLD presenting via EMS for episode of diaphoresis and generalized weakness.     Of note, recent admission for unwitnessed mechanical fall w/ head strike. Able to ambulate after the fall. Underwent TTE showing insigificant findings. Carotid dopplers w/o stenosis and lower extremity without DVT. Discharged with negative workup with follow-up cardiology and neurology. She does not ambulate with assisted devicesw in side the house and lives alone.  83 year old female w/ hx of HTN, GERD, diverticulosis c/b abscess requiring hemicolectomy further c/b empyema requiring thoracotomy and HLD presenting via EMS for episode of diaphoresis and generalized weakness. Of note, recent admission for unwitnessed mechanical fall w/ head strike. Able to ambulate after the fall. Underwent TTE showing insignificant findings. Carotid dopplers w/o stenosis and lower extremity without DVT. Discharged with negative workup with follow-up cardiology and neurology. She lives alone and normally does not use assisted devices at home.     She mentions that following discharge, she has another fall. She denies loss of consciousness, prodromal symptoms, seizure like activity or urinary/bowel incontinence. She mentions that she felt her feet trip over each other and she went down, denies head trauma. Was able to get back up without much effort and denies confusion or lack of awareness following event. She presents today due to weakness when she woke up 1/1/24 AM. Denies headaches, dizziness, or other symptoms. No fever, chills. Thought it would be good to come in.     In ED, found to be tachycardic to 130s with atrial fibrillation, BP stable. Labs significant for hg to 7.8. CT imaging w/ findings of small right and trace left pleural effusions. Subcutaneous hematoma in the proximal right thigh.

## 2024-01-03 ENCOUNTER — TRANSCRIPTION ENCOUNTER (OUTPATIENT)
Age: 84
End: 2024-01-03

## 2024-01-03 LAB
ANION GAP SERPL CALC-SCNC: 10 MMOL/L — SIGNIFICANT CHANGE UP (ref 5–17)
ANION GAP SERPL CALC-SCNC: 10 MMOL/L — SIGNIFICANT CHANGE UP (ref 5–17)
BUN SERPL-MCNC: 20 MG/DL — SIGNIFICANT CHANGE UP (ref 7–23)
BUN SERPL-MCNC: 20 MG/DL — SIGNIFICANT CHANGE UP (ref 7–23)
CALCIUM SERPL-MCNC: 8.8 MG/DL — SIGNIFICANT CHANGE UP (ref 8.4–10.5)
CALCIUM SERPL-MCNC: 8.8 MG/DL — SIGNIFICANT CHANGE UP (ref 8.4–10.5)
CHLORIDE SERPL-SCNC: 106 MMOL/L — SIGNIFICANT CHANGE UP (ref 96–108)
CHLORIDE SERPL-SCNC: 106 MMOL/L — SIGNIFICANT CHANGE UP (ref 96–108)
CO2 SERPL-SCNC: 24 MMOL/L — SIGNIFICANT CHANGE UP (ref 22–31)
CO2 SERPL-SCNC: 24 MMOL/L — SIGNIFICANT CHANGE UP (ref 22–31)
CREAT SERPL-MCNC: 0.69 MG/DL — SIGNIFICANT CHANGE UP (ref 0.5–1.3)
CREAT SERPL-MCNC: 0.69 MG/DL — SIGNIFICANT CHANGE UP (ref 0.5–1.3)
EGFR: 86 ML/MIN/1.73M2 — SIGNIFICANT CHANGE UP
EGFR: 86 ML/MIN/1.73M2 — SIGNIFICANT CHANGE UP
GLUCOSE SERPL-MCNC: 105 MG/DL — HIGH (ref 70–99)
GLUCOSE SERPL-MCNC: 105 MG/DL — HIGH (ref 70–99)
HCT VFR BLD CALC: 25.7 % — LOW (ref 34.5–45)
HCT VFR BLD CALC: 25.7 % — LOW (ref 34.5–45)
HGB BLD-MCNC: 8.1 G/DL — LOW (ref 11.5–15.5)
HGB BLD-MCNC: 8.1 G/DL — LOW (ref 11.5–15.5)
MCHC RBC-ENTMCNC: 28.9 PG — SIGNIFICANT CHANGE UP (ref 27–34)
MCHC RBC-ENTMCNC: 28.9 PG — SIGNIFICANT CHANGE UP (ref 27–34)
MCHC RBC-ENTMCNC: 31.5 GM/DL — LOW (ref 32–36)
MCHC RBC-ENTMCNC: 31.5 GM/DL — LOW (ref 32–36)
MCV RBC AUTO: 91.8 FL — SIGNIFICANT CHANGE UP (ref 80–100)
MCV RBC AUTO: 91.8 FL — SIGNIFICANT CHANGE UP (ref 80–100)
NRBC # BLD: 0 /100 WBCS — SIGNIFICANT CHANGE UP (ref 0–0)
NRBC # BLD: 0 /100 WBCS — SIGNIFICANT CHANGE UP (ref 0–0)
PLATELET # BLD AUTO: 218 K/UL — SIGNIFICANT CHANGE UP (ref 150–400)
PLATELET # BLD AUTO: 218 K/UL — SIGNIFICANT CHANGE UP (ref 150–400)
POTASSIUM SERPL-MCNC: 4.1 MMOL/L — SIGNIFICANT CHANGE UP (ref 3.5–5.3)
POTASSIUM SERPL-MCNC: 4.1 MMOL/L — SIGNIFICANT CHANGE UP (ref 3.5–5.3)
POTASSIUM SERPL-SCNC: 4.1 MMOL/L — SIGNIFICANT CHANGE UP (ref 3.5–5.3)
POTASSIUM SERPL-SCNC: 4.1 MMOL/L — SIGNIFICANT CHANGE UP (ref 3.5–5.3)
RBC # BLD: 2.8 M/UL — LOW (ref 3.8–5.2)
RBC # BLD: 2.8 M/UL — LOW (ref 3.8–5.2)
RBC # FLD: 15.6 % — HIGH (ref 10.3–14.5)
RBC # FLD: 15.6 % — HIGH (ref 10.3–14.5)
SODIUM SERPL-SCNC: 140 MMOL/L — SIGNIFICANT CHANGE UP (ref 135–145)
SODIUM SERPL-SCNC: 140 MMOL/L — SIGNIFICANT CHANGE UP (ref 135–145)
WBC # BLD: 7.39 K/UL — SIGNIFICANT CHANGE UP (ref 3.8–10.5)
WBC # BLD: 7.39 K/UL — SIGNIFICANT CHANGE UP (ref 3.8–10.5)
WBC # FLD AUTO: 7.39 K/UL — SIGNIFICANT CHANGE UP (ref 3.8–10.5)
WBC # FLD AUTO: 7.39 K/UL — SIGNIFICANT CHANGE UP (ref 3.8–10.5)

## 2024-01-03 RX ORDER — CHLORHEXIDINE GLUCONATE 213 G/1000ML
1 SOLUTION TOPICAL
Refills: 0 | Status: DISCONTINUED | OUTPATIENT
Start: 2024-01-03 | End: 2024-01-04

## 2024-01-03 RX ADMIN — SENNA PLUS 2 TABLET(S): 8.6 TABLET ORAL at 21:55

## 2024-01-03 RX ADMIN — Medication 12.5 MILLIGRAM(S): at 05:21

## 2024-01-03 RX ADMIN — Medication 1 DROP(S): at 05:56

## 2024-01-03 RX ADMIN — Medication 150 MILLIGRAM(S): at 13:45

## 2024-01-03 RX ADMIN — Medication 12.5 MILLIGRAM(S): at 20:03

## 2024-01-03 RX ADMIN — DICLOFENAC SODIUM 75 MILLIGRAM(S): 75 TABLET, DELAYED RELEASE ORAL at 13:44

## 2024-01-03 RX ADMIN — Medication 1 DROP(S): at 13:46

## 2024-01-03 RX ADMIN — Medication 81 MILLIGRAM(S): at 13:45

## 2024-01-03 RX ADMIN — CHLORHEXIDINE GLUCONATE 1 APPLICATION(S): 213 SOLUTION TOPICAL at 13:46

## 2024-01-03 RX ADMIN — ATORVASTATIN CALCIUM 20 MILLIGRAM(S): 80 TABLET, FILM COATED ORAL at 21:55

## 2024-01-03 RX ADMIN — DICLOFENAC SODIUM 75 MILLIGRAM(S): 75 TABLET, DELAYED RELEASE ORAL at 14:30

## 2024-01-03 RX ADMIN — Medication 1 DROP(S): at 21:58

## 2024-01-03 RX ADMIN — Medication 10 MILLIGRAM(S): at 05:22

## 2024-01-03 RX ADMIN — Medication 10 MILLIGRAM(S): at 17:21

## 2024-01-03 NOTE — PROGRESS NOTE ADULT - SUBJECTIVE AND OBJECTIVE BOX
Date of Service  : 01-04-24     INTERVAL HPI/OVERNIGHT EVENTS: I feel fine .I want to go home.   Vital Signs Last 24 Hrs  T(C): 36.9 (01-03-24 @ 04:28), Max: 37.1 (01-02-24 @ 09:20)  HR: 91 (01-03-24 @ 05:20) (76 - 96)  BP: 101/62 (01-03-24 @ 05:20) (101/62 - 116/66)  RR: 18 (01-03-24 @ 05:20) (17 - 18)  SpO2: 95% (01-03-24 @ 05:20) (95% - 99%)          MEDICATIONS  (STANDING):  artificial  tears Solution 1 Drop(s) Both EYES three times a day  aspirin enteric coated 81 milliGRAM(s) Oral daily  atorvastatin 20 milliGRAM(s) Oral at bedtime  chlorhexidine 2% Cloths 1 Application(s) Topical <User Schedule>  diclofenac 75 milliGRAM(s) Oral daily  influenza  Vaccine (HIGH DOSE) 0.7 milliLiter(s) IntraMuscular once  metoprolol tartrate 12.5 milliGRAM(s) Oral two times a day  misoprostol 200 MICROGram(s) Oral <User Schedule>  oxybutynin 10 milliGRAM(s) Oral two times a day  senna 2 Tablet(s) Oral at bedtime  sodium chloride 2% Ophthalmic Solution 1 Drop(s) Both EYES daily  venlafaxine XR. 150 milliGRAM(s) Oral daily    MEDICATIONS  (PRN):  acetaminophen     Tablet .. 650 milliGRAM(s) Oral every 6 hours PRN Mild Pain (1 - 3)  zolpidem 5 milliGRAM(s) Oral at bedtime PRN Insomnia                  REVIEW OF SYSTEMS:  CONSTITUTIONAL: No fever, weight loss, or fatigue  EYES: No eye pain, visual disturbances, or discharge  ENMT:  No difficulty hearing, tinnitus, vertigo; No sinus or throat pain  NECK: No pain or stiffness  RESPIRATORY: No cough, wheezing, chills or hemoptysis; No shortness of breath  CARDIOVASCULAR: No chest pain, palpitations, dizziness, or leg swelling  GASTROINTESTINAL: No abdominal or epigastric pain. No nausea, vomiting, or hematemesis; No diarrhea or constipation. No melena or hematochezia.  GENITOURINARY: No dysuria, frequency, hematuria, or incontinence  NEUROLOGICAL: No headaches, memory loss, loss of strength, numbness, or tremors    Consultant(s) Notes Reviewed:  [x ] YES  [ ] NO    PHYSICAL EXAM:  GENERAL: NAD, well-groomed, well-developed,not in any distress ,  HEAD:  Atraumatic, Normocephalic  EYES: EOMI, PERRLA, conjunctiva and sclera clear  ENMT: No tonsillar erythema, exudates, or enlargement; Moist mucous membranes, Good dentition, No lesions  NECK: Supple, No JVD, Normal thyroid  NERVOUS SYSTEM:  Alert & Oriented X3, No focal deficit   CHEST/LUNG: Good air entry bilateral with no  rales, rhonchi, wheezing, or rubs  HEART: Regular rate and rhythm; No murmurs, rubs, or gallops  ABDOMEN: Soft, Nontender, Nondistended; Bowel sounds present  EXTREMITIES:  2+ Peripheral Pulses, No clubbing, cyanosis, or edema    Care Discussed with Consultants/Other Providers [ x] YES  [ ] NO

## 2024-01-03 NOTE — DISCHARGE NOTE PROVIDER - HOSPITAL COURSE
HPI:  83 year old female w/ hx of HTN, GERD, diverticulosis c/b abscess requiring hemicolectomy further c/b empyema requiring thoracotomy and HLD presenting via EMS for episode of diaphoresis and generalized weakness. Of note, recent admission for unwitnessed mechanical fall w/ head strike. Able to ambulate after the fall. Underwent TTE showing insignificant findings. Carotid dopplers w/o stenosis and lower extremity without DVT. Discharged with negative workup with follow-up cardiology and neurology. She lives alone and normally does not use assisted devices at home.     She mentions that following discharge, she has another fall. She denies loss of consciousness, prodromal symptoms, seizure like activity or urinary/bowel incontinence. She mentions that she felt her feet trip over each other and she went down, denies head trauma. Was able to get back up without much effort and denies confusion or lack of awareness following event. She presents today due to weakness when she woke up 1/1/24 AM. Denies headaches, dizziness, or other symptoms. No fever, chills. Thought it would be good to come in.     In ED, found to be tachycardic to 130s with atrial fibrillation, BP stable. Labs significant for hg to 7.8. CT imaging w/ findings of small right and trace left pleural effusions. Subcutaneous hematoma in the proximal right thigh.  (02 Jan 2024 02:19)    Hospital Course:    Pt with New onset atrial fibrillation. w/ rvr , initially hypotensive now reverted to sinus ,  No priox hx. Structural disease on TTE performed 12/29/23 w/ normal LV systolic function, grade 2 LV diastolic dysfunction; left atrium severely dilated.  CHADsVASc score of 4 indicating 4.8% stroke risk per year.  needs AC, however, complicated w/ hematoma and worsening hgb . started  lopressor 12.5mg BID. cardiology appreciated.   pt describes  fall as a mechanical fall. Recent extensive workup regarding fall without etiology. Orthostatics negative. CT imaging showing subcutaneous hematoma in the proximal right thigh. Patient has  pain with ambulation, minimal. Etiology likely iso recent trauma 2/2 mechanical fall. consulted  surgery. no surgical intervention, recommended  to hold AC iso dropping hgb  and  no AC for atrial fibrillation.  H/H stable. No further events on telemetry.   Medically cleared for discharge Home with Home PT    Electronic Signatures:      Important Medication Changes and Reason:    Active or Pending Issues Requiring Follow-up:    Advanced Directives:   [ ] Full code  [ ] DNR  [ ] Hospice    Discharge Diagnoses:  . fall at Home   R Thigh hematoma   Chronic L patella fracture  New onset pafib       HPI:  83 year old female w/ hx of HTN, GERD, diverticulosis c/b abscess requiring hemicolectomy further c/b empyema requiring thoracotomy and HLD presenting via EMS for episode of diaphoresis and generalized weakness. Of note, recent admission for unwitnessed mechanical fall w/ head strike. Able to ambulate after the fall. Underwent TTE showing insignificant findings. Carotid dopplers w/o stenosis and lower extremity without DVT. Discharged with negative workup with follow-up cardiology and neurology. She lives alone and normally does not use assisted devices at home.     She mentions that following discharge, she has another fall. She denies loss of consciousness, prodromal symptoms, seizure like activity or urinary/bowel incontinence. She mentions that she felt her feet trip over each other and she went down, denies head trauma. Was able to get back up without much effort and denies confusion or lack of awareness following event. She presents today due to weakness when she woke up 1/1/24 AM. Denies headaches, dizziness, or other symptoms. No fever, chills. Thought it would be good to come in.     In ED, found to be tachycardic to 130s with atrial fibrillation, BP stable. Labs significant for hg to 7.8. CT imaging w/ findings of small right and trace left pleural effusions. Subcutaneous hematoma in the proximal right thigh.  (02 Jan 2024 02:19)    Hospital Course:  Pt with New onset atrial fibrillation w/ rvr, initially hypotensive now reverted to sinus. Recent Echo showed a hyperdynamic LV with mild to moderate Mitral stenosis. On imaging Ms. Tucker is found to have a subcutaneous hematoma over the R greater trochanter 2/2 recent fall along with R patella fracture. Orthopedics input appreciated. No surgical intervention planned at this time.   CHADsVASc score of 4 indicating 4.8% stroke risk per year. Seen by cardiology, recommending to hold off on AC at this time and defer to her outpatient cardiologist Dr. Traube for initiation of AC. Also having frequent PACs on monitor, would benefit from outpatient Holter monitoring.  Medically cleared for discharge Home with Home PT.    Advanced Directives:   [x] Full code  [ ] DNR  [ ] Hospice    Discharge Diagnoses:  Fall at Home  R Thigh hematoma  Chronic L patella fracture  New onset afib

## 2024-01-03 NOTE — PHYSICAL THERAPY INITIAL EVALUATION ADULT - GAIT TRAINING, PT EVAL
What Is The Reason For Today's Visit?: Preventative Skin Check GOAL: Pt will ambulate with or without appropriate assistive device x 150 feet indep in 2 weeks.

## 2024-01-03 NOTE — PHYSICAL THERAPY INITIAL EVALUATION ADULT - ACTIVE RANGE OF MOTION EXAMINATION, REHAB EVAL
except R shoulder flexion & abduction to ~100deg (per pt was from previous fall)/bilateral upper extremity Active ROM was WFL (within functional limits)/bilateral  lower extremity Active ROM was WFL (within functional limits)

## 2024-01-03 NOTE — PHYSICAL THERAPY INITIAL EVALUATION ADULT - GAIT DEVIATIONS NOTED, PT EVAL
José Velázquez MD   decreased velocity of limb motion/decreased step length

## 2024-01-03 NOTE — PHYSICAL THERAPY INITIAL EVALUATION ADULT - PLANNED THERAPY INTERVENTIONS, PT EVAL
stairs: GOAL: Pt will negotiate 3 steps of stairs with or without appropriate assistive device and handrail via reciprocal/step-to technique indep in 2 weeks./balance training/bed mobility training/gait training/transfer training

## 2024-01-03 NOTE — PHYSICAL THERAPY INITIAL EVALUATION ADULT - PERTINENT HX OF CURRENT PROBLEM, REHAB EVAL
83F w/ hx of HTN, GERD, diverticulosis c/b abscess requiring hemicolectomy further c/b empyema requiring thoracotomy and HLD presenting via EMS for episode of diaphoresis & gen weakness. Pt with recent admission for unwitnessed mechanical fall w/ head strike. Able to ambulate after the fall. Underwent TTE showing insignificant findings. Carotid dopplers w/o stenosis & lower extremity without DVT. D/c'd with negative workup with f/up cardiology & neurology. Pt reports that following discharge, she has another fall. Pt denies LOC, prodromal symptoms, seizure like activity or urinary/bowel incontinence. She mentions that she felt her feet trip over each other and she went down, denies head trauma. Was able to get back up without much effort and denies confusion or lack of awareness following event. She presents today due to weakness when she woke up 1/1/24 AM. Denies headaches, dizziness, or other symptoms. No fever, chills. Thought it would be good to come in. In ED, found to be tachycardic to 130s with atrial fibrillation, BP stable. Labs significant for hg to 7.8. CT imaging w/ findings of small right and trace left pleural effusions. Subcutaneous hematoma in the proximal right thigh.       CHEST XRAY 1/1: Clear lungs. CT RLE 1/1: Slightly displaced sagittal plane fracture involves the medial   patellar facet of indeterminate age although sclerosis may suggest a chronic injury. Correlation with site of pain is suggested. Subcutaneous hematoma overlies the right greater trochanter measuring approximately 4.8 x 7.5 x 12.3 cm. CT BRAIN 1/1: No intracranial hemorrhage, mass effect, or midline shift. CT CHEST, ABDOMEN, PELVIS 1/1: Small right and trace left pleural effusions. 6.4 x 4.8 x 11.4 cm hematoma within the subcutaneous tissues adjacent to the right trochanter.

## 2024-01-03 NOTE — PROGRESS NOTE ADULT - SUBJECTIVE AND OBJECTIVE BOX
Cardiovascular Disease Progress Note  Date of service: 01-03-24 @ 07:33    Overnight events: No acute events overnight.  Patient is in no distress.   Otherwise review of systems negative    Objective Findings:  T(C): 36.9 (01-03-24 @ 04:28), Max: 37.1 (01-02-24 @ 09:20)  HR: 91 (01-03-24 @ 05:20) (76 - 96)  BP: 101/62 (01-03-24 @ 05:20) (101/62 - 116/66)  RR: 18 (01-03-24 @ 05:20) (17 - 18)  SpO2: 95% (01-03-24 @ 05:20) (95% - 99%)  Wt(kg): --  Daily     Daily       Physical Exam:  Gen: NAD; Patient resting comfortably  HEENT: EOMI, Normocephalic/ atraumatic  CV: RRR, normal S1 + S2, no m/r/g  Lungs:  Normal respiratory effort; clear to auscultation bilaterally  Abd: soft, non-tender; bowel sounds present  Ext: No edema; warm and well perfused    Telemetry: Sinus with PACs    Laboratory Data:                        8.6    6.77  )-----------( 222      ( 02 Jan 2024 06:56 )             26.5     01-02    142  |  105  |  17  ----------------------------<  107<H>  3.3<L>   |  25  |  0.66    Ca    8.9      02 Jan 2024 06:56  Phos  3.1     01-01  Mg     1.9     01-01    TPro  5.9<L>  /  Alb  3.8  /  TBili  1.6<H>  /  DBili  x   /  AST  27  /  ALT  19  /  AlkPhos  95  01-01    PT/INR - ( 02 Jan 2024 06:56 )   PT: 11.6 sec;   INR: 1.06 ratio         PTT - ( 02 Jan 2024 06:56 )  PTT:35.7 sec          Inpatient Medications:  MEDICATIONS  (STANDING):  artificial  tears Solution 1 Drop(s) Both EYES three times a day  aspirin enteric coated 81 milliGRAM(s) Oral daily  atorvastatin 20 milliGRAM(s) Oral at bedtime  diclofenac 75 milliGRAM(s) Oral daily  influenza  Vaccine (HIGH DOSE) 0.7 milliLiter(s) IntraMuscular once  metoprolol tartrate 12.5 milliGRAM(s) Oral two times a day  misoprostol 200 MICROGram(s) Oral <User Schedule>  oxybutynin 10 milliGRAM(s) Oral two times a day  senna 2 Tablet(s) Oral at bedtime  sodium chloride 2% Ophthalmic Solution 1 Drop(s) Both EYES daily  venlafaxine XR. 150 milliGRAM(s) Oral daily      Assessment:  83 year old female w/ hx of HTN, GERD, diverticulosis c/b abscess requiring hemicolectomy and HLD presenting via EMS for episode of diaphoresis and generalized weakness.     Plan of Care:      #Afib  - New onset  - On imaging Ms. Tucker is found to have a subcutaneous hematoma over the R greater trochanter 2/2 recent fall along with R patella fracture  - Will hold off on AC at this time   - Recent Echo showed a hyperdynamic LV with mild to moderate Mitral stenosis.   - Orthostatics negative.   - Orthopedics input appreciated. No surgical intervention planned at this time.     #HTN  - BP acceptable    #HLD   - Statin therapy    #ACP (advance care planning)-  Advanced care planning was discussed with the patient.  Risks, benefits and alternatives of medical treatment and procedures were discussed in detail and all questions were answered. 30 additional minutes spent addressing advance care plans.          Over 25 minutes spent on total encounter; more than 50% of the visit was spent counseling and/or coordinating care by the attending physician.      Jan Garrison DO [facility]  Cardiovascular Disease  [phone] Cardiovascular Disease Progress Note  Date of service: 01-03-24 @ 07:33    Overnight events: No acute events overnight.  Patient is in no distress.   Otherwise review of systems negative    Objective Findings:  T(C): 36.9 (01-03-24 @ 04:28), Max: 37.1 (01-02-24 @ 09:20)  HR: 91 (01-03-24 @ 05:20) (76 - 96)  BP: 101/62 (01-03-24 @ 05:20) (101/62 - 116/66)  RR: 18 (01-03-24 @ 05:20) (17 - 18)  SpO2: 95% (01-03-24 @ 05:20) (95% - 99%)  Wt(kg): --  Daily     Daily       Physical Exam:  Gen: NAD; Patient resting comfortably  HEENT: EOMI, Normocephalic/ atraumatic  CV: RRR, normal S1 + S2, no m/r/g  Lungs:  Normal respiratory effort; clear to auscultation bilaterally  Abd: soft, non-tender; bowel sounds present  Ext: No edema; warm and well perfused    Telemetry: Sinus with PACs    Laboratory Data:                        8.6    6.77  )-----------( 222      ( 02 Jan 2024 06:56 )             26.5     01-02    142  |  105  |  17  ----------------------------<  107<H>  3.3<L>   |  25  |  0.66    Ca    8.9      02 Jan 2024 06:56  Phos  3.1     01-01  Mg     1.9     01-01    TPro  5.9<L>  /  Alb  3.8  /  TBili  1.6<H>  /  DBili  x   /  AST  27  /  ALT  19  /  AlkPhos  95  01-01    PT/INR - ( 02 Jan 2024 06:56 )   PT: 11.6 sec;   INR: 1.06 ratio         PTT - ( 02 Jan 2024 06:56 )  PTT:35.7 sec          Inpatient Medications:  MEDICATIONS  (STANDING):  artificial  tears Solution 1 Drop(s) Both EYES three times a day  aspirin enteric coated 81 milliGRAM(s) Oral daily  atorvastatin 20 milliGRAM(s) Oral at bedtime  diclofenac 75 milliGRAM(s) Oral daily  influenza  Vaccine (HIGH DOSE) 0.7 milliLiter(s) IntraMuscular once  metoprolol tartrate 12.5 milliGRAM(s) Oral two times a day  misoprostol 200 MICROGram(s) Oral <User Schedule>  oxybutynin 10 milliGRAM(s) Oral two times a day  senna 2 Tablet(s) Oral at bedtime  sodium chloride 2% Ophthalmic Solution 1 Drop(s) Both EYES daily  venlafaxine XR. 150 milliGRAM(s) Oral daily      Assessment:  83 year old female w/ hx of HTN, GERD, diverticulosis c/b abscess requiring hemicolectomy and HLD presenting via EMS for episode of diaphoresis and generalized weakness.     Plan of Care:      #Afib  - New onset  - On imaging Ms. Tucker is found to have a subcutaneous hematoma over the R greater trochanter 2/2 recent fall along with R patella fracture  - Will hold off on AC at this time   - Recent Echo showed a hyperdynamic LV with mild to moderate Mitral stenosis.   - Orthostatics negative.   - Orthopedics input appreciated. No surgical intervention planned at this time.     #HTN  - BP acceptable    #HLD   - Statin therapy    #ACP (advance care planning)-  Advanced care planning was discussed with the patient.  Risks, benefits and alternatives of medical treatment and procedures were discussed in detail and all questions were answered. 30 additional minutes spent addressing advance care plans.          Over 25 minutes spent on total encounter; more than 50% of the visit was spent counseling and/or coordinating care by the attending physician.      Jan Garrison DO [facility]  Cardiovascular Disease  [phone]

## 2024-01-03 NOTE — DISCHARGE NOTE PROVIDER - PROVIDER TOKENS
PROVIDER:[TOKEN:[[id number]],FOLLOWUP:[1-3 days]] PROVIDER:[TOKEN:[[id number]],FOLLOWUP:[1-3 days]]

## 2024-01-03 NOTE — PHYSICAL THERAPY INITIAL EVALUATION ADULT - NSPTDMEREC_GEN_A_CORE
Pt will require rolling walker for discharge due to s/p fall to help complete mobility related ADLs./rolling walker

## 2024-01-03 NOTE — DISCHARGE NOTE PROVIDER - CARE PROVIDER_API CALL
Traube, Charles  Cardiovascular Disease  [facility] [place] [address]  Phone: [phone]  Fax: [phone]  Follow Up Time: 1-3 days   Traube, Charles  Cardiovascular Disease  [facility] [place] [address]  Phone: [phone]  Fax: [phone]  Follow Up Time: 1-3 days

## 2024-01-03 NOTE — PHYSICAL THERAPY INITIAL EVALUATION ADULT - ADDITIONAL COMMENTS
Pt lives in a co-op apartment, 3 steps total to enter. Pt owns straight cane & rollator (purchased by daughter) which pt states she was not using PTA.

## 2024-01-03 NOTE — DISCHARGE NOTE PROVIDER - NSDCCPCAREPLAN_GEN_ALL_CORE_FT
PRINCIPAL DISCHARGE DIAGNOSIS  Diagnosis: Atrial fibrillation, new onset  Assessment and Plan of Treatment: -found to have a subcutaneous hematoma over the Right greater trochanter secondary to recent fall along with Right patella fracture  - hold off on anticoagulation at this time given hematoma  - follow up with your cardiologist regarding initiation of anticoagulation  - would benefit from holter monitor setup as outpatient at cardiologist's office   - Recent Echo showed a hyperdynamic LV with mild to moderate Mitral stenosis.         SECONDARY DISCHARGE DIAGNOSES  Diagnosis: Hematoma of right thigh  Assessment and Plan of Treatment: - monitor H/H and hematoma for bleeding/increase in size- seek medical care for any signs of worsening symptoms  - continue to hold aspirin for 7 days after discharge      Diagnosis: Patella fracture  Assessment and Plan of Treatment: - found to have a subcutaneous hematoma over the Right greater trochanter secondary to recent fall along with Right patella fracture  - Orthopedics input appreciated. No surgical intervention planned at this time.    Diagnosis: Hypertension  Assessment and Plan of Treatment: Continue Lopressor 12.5mg twice a day  Hold all other home BP medications and resume once approved by your PCP and/or cardiologist     PRINCIPAL DISCHARGE DIAGNOSIS  Diagnosis: Atrial fibrillation, new onset  Assessment and Plan of Treatment: -found to have a subcutaneous hematoma over the Right greater trochanter secondary to recent fall along with Right patella fracture  - hold off on anticoagulation at this time given hematoma  - follow up with your cardiologist regarding initiation of anticoagulation  - would benefit from holter monitor setup as outpatient at cardiologist's office   - Recent Echo showed a hyperdynamic LV with mild to moderate Mitral stenosis.         SECONDARY DISCHARGE DIAGNOSES  Diagnosis: Hematoma of right thigh  Assessment and Plan of Treatment: - monitor H/H and hematoma for bleeding/increase in size- seek medical care for any signs of worsening symptoms  - continue to hold aspirin for 7 days after discharge      Diagnosis: Patella fracture  Assessment and Plan of Treatment: - found to have a subcutaneous hematoma over the Right greater trochanter secondary to recent fall along with Right patella fracture  - Orthopedics input appreciated. No surgical intervention planned at this time.    Diagnosis: Hypertension  Assessment and Plan of Treatment: Continue Lopressor 12.5mg twice a day  Hold all other home BP medications and resume once approved by your PCP and/or cardiologist    Diagnosis: Anemia  Assessment and Plan of Treatment: Notify your doctor immediately if you experience abnormal bleeding.  Avoid overuse of NSAIDs (aspirin, Ibuprofen, Advil, Motrin, and Aleve) unless instructed to do so by your doctor.  Signs of worsening anemia include dizziness, lightheadedness, difficulty concentrating, chest pain, palpitations, and shortness of breath.  If you experience these symptoms call your doctor or call an ambulance to take you to the emergency room.  -please follow up with your PCP to monitor your hemoglobin

## 2024-01-03 NOTE — PHYSICAL THERAPY INITIAL EVALUATION ADULT - GENERAL OBSERVATIONS, REHAB EVAL
Received seated on chair, +IVL, +tele, noted ecchymoses on R hip & buttock extending down to posterior aspect of R thigh, +ecchymosis on R temple area. Pt without complaints and agreeable to PT

## 2024-01-04 ENCOUNTER — TRANSCRIPTION ENCOUNTER (OUTPATIENT)
Age: 84
End: 2024-01-04

## 2024-01-04 VITALS
RESPIRATION RATE: 18 BRPM | TEMPERATURE: 98 F | DIASTOLIC BLOOD PRESSURE: 61 MMHG | HEART RATE: 80 BPM | OXYGEN SATURATION: 99 % | SYSTOLIC BLOOD PRESSURE: 114 MMHG

## 2024-01-04 LAB
ANION GAP SERPL CALC-SCNC: 10 MMOL/L — SIGNIFICANT CHANGE UP (ref 5–17)
ANION GAP SERPL CALC-SCNC: 10 MMOL/L — SIGNIFICANT CHANGE UP (ref 5–17)
BUN SERPL-MCNC: 19 MG/DL — SIGNIFICANT CHANGE UP (ref 7–23)
BUN SERPL-MCNC: 19 MG/DL — SIGNIFICANT CHANGE UP (ref 7–23)
CALCIUM SERPL-MCNC: 8.7 MG/DL — SIGNIFICANT CHANGE UP (ref 8.4–10.5)
CALCIUM SERPL-MCNC: 8.7 MG/DL — SIGNIFICANT CHANGE UP (ref 8.4–10.5)
CHLORIDE SERPL-SCNC: 106 MMOL/L — SIGNIFICANT CHANGE UP (ref 96–108)
CHLORIDE SERPL-SCNC: 106 MMOL/L — SIGNIFICANT CHANGE UP (ref 96–108)
CO2 SERPL-SCNC: 25 MMOL/L — SIGNIFICANT CHANGE UP (ref 22–31)
CO2 SERPL-SCNC: 25 MMOL/L — SIGNIFICANT CHANGE UP (ref 22–31)
CREAT SERPL-MCNC: 0.6 MG/DL — SIGNIFICANT CHANGE UP (ref 0.5–1.3)
CREAT SERPL-MCNC: 0.6 MG/DL — SIGNIFICANT CHANGE UP (ref 0.5–1.3)
EGFR: 89 ML/MIN/1.73M2 — SIGNIFICANT CHANGE UP
EGFR: 89 ML/MIN/1.73M2 — SIGNIFICANT CHANGE UP
GLUCOSE SERPL-MCNC: 107 MG/DL — HIGH (ref 70–99)
GLUCOSE SERPL-MCNC: 107 MG/DL — HIGH (ref 70–99)
HCT VFR BLD CALC: 25.9 % — LOW (ref 34.5–45)
HCT VFR BLD CALC: 25.9 % — LOW (ref 34.5–45)
HGB BLD-MCNC: 8.2 G/DL — LOW (ref 11.5–15.5)
HGB BLD-MCNC: 8.2 G/DL — LOW (ref 11.5–15.5)
MCHC RBC-ENTMCNC: 29.3 PG — SIGNIFICANT CHANGE UP (ref 27–34)
MCHC RBC-ENTMCNC: 29.3 PG — SIGNIFICANT CHANGE UP (ref 27–34)
MCHC RBC-ENTMCNC: 31.7 GM/DL — LOW (ref 32–36)
MCHC RBC-ENTMCNC: 31.7 GM/DL — LOW (ref 32–36)
MCV RBC AUTO: 92.5 FL — SIGNIFICANT CHANGE UP (ref 80–100)
MCV RBC AUTO: 92.5 FL — SIGNIFICANT CHANGE UP (ref 80–100)
MRSA PCR RESULT.: SIGNIFICANT CHANGE UP
MRSA PCR RESULT.: SIGNIFICANT CHANGE UP
NRBC # BLD: 0 /100 WBCS — SIGNIFICANT CHANGE UP (ref 0–0)
NRBC # BLD: 0 /100 WBCS — SIGNIFICANT CHANGE UP (ref 0–0)
PLATELET # BLD AUTO: 216 K/UL — SIGNIFICANT CHANGE UP (ref 150–400)
PLATELET # BLD AUTO: 216 K/UL — SIGNIFICANT CHANGE UP (ref 150–400)
POTASSIUM SERPL-MCNC: 3.6 MMOL/L — SIGNIFICANT CHANGE UP (ref 3.5–5.3)
POTASSIUM SERPL-MCNC: 3.6 MMOL/L — SIGNIFICANT CHANGE UP (ref 3.5–5.3)
POTASSIUM SERPL-SCNC: 3.6 MMOL/L — SIGNIFICANT CHANGE UP (ref 3.5–5.3)
POTASSIUM SERPL-SCNC: 3.6 MMOL/L — SIGNIFICANT CHANGE UP (ref 3.5–5.3)
RBC # BLD: 2.8 M/UL — LOW (ref 3.8–5.2)
RBC # BLD: 2.8 M/UL — LOW (ref 3.8–5.2)
RBC # FLD: 16.2 % — HIGH (ref 10.3–14.5)
RBC # FLD: 16.2 % — HIGH (ref 10.3–14.5)
S AUREUS DNA NOSE QL NAA+PROBE: SIGNIFICANT CHANGE UP
S AUREUS DNA NOSE QL NAA+PROBE: SIGNIFICANT CHANGE UP
SODIUM SERPL-SCNC: 141 MMOL/L — SIGNIFICANT CHANGE UP (ref 135–145)
SODIUM SERPL-SCNC: 141 MMOL/L — SIGNIFICANT CHANGE UP (ref 135–145)
WBC # BLD: 6.9 K/UL — SIGNIFICANT CHANGE UP (ref 3.8–10.5)
WBC # BLD: 6.9 K/UL — SIGNIFICANT CHANGE UP (ref 3.8–10.5)
WBC # FLD AUTO: 6.9 K/UL — SIGNIFICANT CHANGE UP (ref 3.8–10.5)
WBC # FLD AUTO: 6.9 K/UL — SIGNIFICANT CHANGE UP (ref 3.8–10.5)

## 2024-01-04 PROCEDURE — 36415 COLL VENOUS BLD VENIPUNCTURE: CPT

## 2024-01-04 PROCEDURE — 73701 CT LOWER EXTREMITY W/DYE: CPT | Mod: MA

## 2024-01-04 PROCEDURE — 81003 URINALYSIS AUTO W/O SCOPE: CPT

## 2024-01-04 PROCEDURE — 70450 CT HEAD/BRAIN W/O DYE: CPT | Mod: MA

## 2024-01-04 PROCEDURE — 80053 COMPREHEN METABOLIC PANEL: CPT

## 2024-01-04 PROCEDURE — 99285 EMERGENCY DEPT VISIT HI MDM: CPT | Mod: 25

## 2024-01-04 PROCEDURE — 84443 ASSAY THYROID STIM HORMONE: CPT

## 2024-01-04 PROCEDURE — 87641 MR-STAPH DNA AMP PROBE: CPT

## 2024-01-04 PROCEDURE — 86850 RBC ANTIBODY SCREEN: CPT

## 2024-01-04 PROCEDURE — 83735 ASSAY OF MAGNESIUM: CPT

## 2024-01-04 PROCEDURE — 84484 ASSAY OF TROPONIN QUANT: CPT

## 2024-01-04 PROCEDURE — 87086 URINE CULTURE/COLONY COUNT: CPT

## 2024-01-04 PROCEDURE — 71045 X-RAY EXAM CHEST 1 VIEW: CPT

## 2024-01-04 PROCEDURE — 74177 CT ABD & PELVIS W/CONTRAST: CPT | Mod: MA

## 2024-01-04 PROCEDURE — 97161 PT EVAL LOW COMPLEX 20 MIN: CPT

## 2024-01-04 PROCEDURE — 85025 COMPLETE CBC W/AUTO DIFF WBC: CPT

## 2024-01-04 PROCEDURE — 71260 CT THORAX DX C+: CPT | Mod: MA

## 2024-01-04 PROCEDURE — 85610 PROTHROMBIN TIME: CPT

## 2024-01-04 PROCEDURE — 87640 STAPH A DNA AMP PROBE: CPT

## 2024-01-04 PROCEDURE — 84100 ASSAY OF PHOSPHORUS: CPT

## 2024-01-04 PROCEDURE — 85730 THROMBOPLASTIN TIME PARTIAL: CPT

## 2024-01-04 PROCEDURE — 86900 BLOOD TYPING SEROLOGIC ABO: CPT

## 2024-01-04 PROCEDURE — 80048 BASIC METABOLIC PNL TOTAL CA: CPT

## 2024-01-04 PROCEDURE — 82962 GLUCOSE BLOOD TEST: CPT

## 2024-01-04 PROCEDURE — 86901 BLOOD TYPING SEROLOGIC RH(D): CPT

## 2024-01-04 PROCEDURE — 85027 COMPLETE CBC AUTOMATED: CPT

## 2024-01-04 PROCEDURE — 87637 SARSCOV2&INF A&B&RSV AMP PRB: CPT

## 2024-01-04 RX ORDER — ASPIRIN/CALCIUM CARB/MAGNESIUM 324 MG
1 TABLET ORAL
Refills: 0 | DISCHARGE

## 2024-01-04 RX ORDER — METOPROLOL TARTRATE 50 MG
0.5 TABLET ORAL
Qty: 30 | Refills: 0
Start: 2024-01-04 | End: 2024-02-02

## 2024-01-04 RX ORDER — SODIUM CHLORIDE 5 %
1 DROPS OPHTHALMIC (EYE)
Refills: 0 | DISCHARGE

## 2024-01-04 RX ORDER — ATORVASTATIN CALCIUM 80 MG/1
1 TABLET, FILM COATED ORAL
Refills: 0 | DISCHARGE

## 2024-01-04 RX ORDER — SENNA PLUS 8.6 MG/1
2 TABLET ORAL
Qty: 0 | Refills: 0 | DISCHARGE
Start: 2024-01-04

## 2024-01-04 RX ORDER — MISOPROSTOL 200 UG/1
1 TABLET ORAL
Refills: 0 | DISCHARGE

## 2024-01-04 RX ORDER — OMEPRAZOLE 10 MG/1
1 CAPSULE, DELAYED RELEASE ORAL
Refills: 0 | DISCHARGE

## 2024-01-04 RX ORDER — ACETAMINOPHEN 500 MG
650 TABLET ORAL EVERY 6 HOURS
Refills: 0 | Status: DISCONTINUED | OUTPATIENT
Start: 2024-01-04 | End: 2024-01-04

## 2024-01-04 RX ORDER — ZOLPIDEM TARTRATE 10 MG/1
0.5 TABLET ORAL
Refills: 0 | DISCHARGE

## 2024-01-04 RX ORDER — TRIAMTERENE/HYDROCHLOROTHIAZID 75 MG-50MG
1 TABLET ORAL
Refills: 0 | DISCHARGE

## 2024-01-04 RX ORDER — ACETAMINOPHEN 500 MG
2 TABLET ORAL
Qty: 0 | Refills: 0 | DISCHARGE
Start: 2024-01-04

## 2024-01-04 RX ORDER — AMLODIPINE BESYLATE 2.5 MG/1
1 TABLET ORAL
Qty: 0 | Refills: 0 | DISCHARGE

## 2024-01-04 RX ORDER — DICLOFENAC SODIUM 75 MG/1
1 TABLET, DELAYED RELEASE ORAL
Refills: 0 | DISCHARGE

## 2024-01-04 RX ORDER — VENLAFAXINE HCL 75 MG
1 CAPSULE, EXT RELEASE 24 HR ORAL
Refills: 0 | DISCHARGE

## 2024-01-04 RX ORDER — OXYBUTYNIN CHLORIDE 5 MG
1 TABLET ORAL
Refills: 0 | DISCHARGE

## 2024-01-04 RX ADMIN — Medication 650 MILLIGRAM(S): at 13:42

## 2024-01-04 RX ADMIN — DICLOFENAC SODIUM 75 MILLIGRAM(S): 75 TABLET, DELAYED RELEASE ORAL at 12:25

## 2024-01-04 RX ADMIN — Medication 1 DROP(S): at 12:25

## 2024-01-04 RX ADMIN — Medication 150 MILLIGRAM(S): at 12:25

## 2024-01-04 RX ADMIN — Medication 1 DROP(S): at 05:23

## 2024-01-04 RX ADMIN — Medication 650 MILLIGRAM(S): at 12:42

## 2024-01-04 RX ADMIN — CHLORHEXIDINE GLUCONATE 1 APPLICATION(S): 213 SOLUTION TOPICAL at 05:22

## 2024-01-04 RX ADMIN — Medication 81 MILLIGRAM(S): at 12:25

## 2024-01-04 RX ADMIN — ZOLPIDEM TARTRATE 5 MILLIGRAM(S): 10 TABLET ORAL at 01:52

## 2024-01-04 RX ADMIN — Medication 1 DROP(S): at 12:26

## 2024-01-04 RX ADMIN — Medication 10 MILLIGRAM(S): at 05:22

## 2024-01-04 RX ADMIN — DICLOFENAC SODIUM 75 MILLIGRAM(S): 75 TABLET, DELAYED RELEASE ORAL at 13:25

## 2024-01-04 NOTE — PHARMACOTHERAPY INTERVENTION NOTE - COMMENTS
Performed medication reconciliation and home medication list updated in prescription writer/outpatient medication review. Medications verified with patient and [facility] pharmacy, [phone].     Home Medications:  amLODIPine 5 mg oral tablet: 1 tab(s) orally once a day Hold for SBP less than 100  aspirin 81 mg oral delayed release tablet: 1 tab(s) orally once a day  atorvastatin 20 mg oral tablet: 1 tab(s) orally once a day (at bedtime)  diclofenac sodium 75 mg oral delayed release tablet: 1 tab(s) orally once a day - taken with misoprostol 200mcg  miSOPROStol 200 mcg oral tablet: 1 tab(s) orally once a day - taken with diclofenac 75mg  Camilla 128 5% ophthalmic solution: 1 drop(s) in each eye once a day  omeprazole 40 mg oral delayed release capsule: 1 cap(s) orally once a day as needed for GERD  oxyBUTYnin 10 mg/24 hr oral tablet, extended release: 1 tab(s) orally 2 times a day  Refresh ophthalmic solution: 1 drop(s) in each eye 3 times a day  triamterene-hydrochlorothiazide 37.5 mg-25 mg oral tablet: 1 tab(s) orally once a day  venlafaxine 150 mg oral capsule, extended release: 1 cap(s) orally once a day  zolpidem 10 mg oral tablet: 0.5 tab(s) orally once a day (at bedtime)      Recommendations:   Patient takes venlafaxine er 150mg, diclofenac dr 75mg, misoprostol 200mcg, triamterene/hct 37.5-25mg, amlodipine 5mg, oxybutynin er 10mg, and aspirin 81 all at once in the morning.  In the evening, she takes atorvastatin 20mg, oxybutynin er 10mg, and ambien 10mg (1/2 tablet) all together at once before bedtime.    Michael (Petey Marcano  Transitions of Care Pharmacist  Available on [employer] Teams (Preferred)  Spectra: [phone]      Time spent: 20 minutes  Performed medication reconciliation and home medication list updated in prescription writer/outpatient medication review. Medications verified with patient and [facility] pharmacy, [phone].     Home Medications:  amLODIPine 5 mg oral tablet: 1 tab(s) orally once a day Hold for SBP less than 100  aspirin 81 mg oral delayed release tablet: 1 tab(s) orally once a day  atorvastatin 20 mg oral tablet: 1 tab(s) orally once a day (at bedtime)  diclofenac sodium 75 mg oral delayed release tablet: 1 tab(s) orally once a day - taken with misoprostol 200mcg  miSOPROStol 200 mcg oral tablet: 1 tab(s) orally once a day - taken with diclofenac 75mg  Camilla 128 5% ophthalmic solution: 1 drop(s) in each eye once a day  omeprazole 40 mg oral delayed release capsule: 1 cap(s) orally once a day as needed for GERD  oxyBUTYnin 10 mg/24 hr oral tablet, extended release: 1 tab(s) orally 2 times a day  Refresh ophthalmic solution: 1 drop(s) in each eye 3 times a day  triamterene-hydrochlorothiazide 37.5 mg-25 mg oral tablet: 1 tab(s) orally once a day  venlafaxine 150 mg oral capsule, extended release: 1 cap(s) orally once a day  zolpidem 10 mg oral tablet: 0.5 tab(s) orally once a day (at bedtime)      Recommendations:   Patient takes venlafaxine er 150mg, diclofenac dr 75mg, misoprostol 200mcg, triamterene/hct 37.5-25mg, amlodipine 5mg, oxybutynin er 10mg, and aspirin 81 all at once in the morning.  In the evening, she takes atorvastatin 20mg, oxybutynin er 10mg, and ambien 10mg (1/2 tablet) all together at once before bedtime.    Michael (Petey Marcano  Transitions of Care Pharmacist  Available on [employer] Teams (Preferred)  Spectra: [phone]      Time spent: 20 minutes

## 2024-01-04 NOTE — PROGRESS NOTE ADULT - SUBJECTIVE AND OBJECTIVE BOX
Date of service: 01-04-24 @ 17:26      Patient is a 83y old  Female who presents with a chief complaint of generalized weakness x few days (04 Jan 2024 09:26)                                                               INTERVAL HPI/OVERNIGHT EVENTS:    REVIEW OF SYSTEMS:     CONSTITUTIONAL: No weakness, fevers or chills  RESPIRATORY: No cough, wheezing,  No shortness of breath  CARDIOVASCULAR: No chest pain or palpitations  GASTROINTESTINAL: No abdominal pain  . No nausea, vomiting, or hematemesis; No diarrhea or constipation. No melena or hematochezia.  GENITOURINARY: No dysuria, frequency or hematuria  NEUROLOGICAL: No numbness or weakness  no LE numbness                                                                                                                                                                                                                                                                                  Medications:  MEDICATIONS  (STANDING):  artificial  tears Solution 1 Drop(s) Both EYES three times a day  aspirin enteric coated 81 milliGRAM(s) Oral daily  atorvastatin 20 milliGRAM(s) Oral at bedtime  chlorhexidine 2% Cloths 1 Application(s) Topical <User Schedule>  diclofenac 75 milliGRAM(s) Oral daily  influenza  Vaccine (HIGH DOSE) 0.7 milliLiter(s) IntraMuscular once  metoprolol tartrate 12.5 milliGRAM(s) Oral two times a day  misoprostol 200 MICROGram(s) Oral <User Schedule>  oxybutynin 10 milliGRAM(s) Oral two times a day  senna 2 Tablet(s) Oral at bedtime  sodium chloride 2% Ophthalmic Solution 1 Drop(s) Both EYES daily  venlafaxine XR. 150 milliGRAM(s) Oral daily    MEDICATIONS  (PRN):  acetaminophen     Tablet .. 650 milliGRAM(s) Oral every 6 hours PRN Mild Pain (1 - 3)  zolpidem 5 milliGRAM(s) Oral at bedtime PRN Insomnia       Allergies    sulfa drugs (Rash)    Intolerances      Vital Signs Last 24 Hrs  T(C): 36.6 (04 Jan 2024 16:38), Max: 36.9 (03 Jan 2024 19:49)  T(F): 97.8 (04 Jan 2024 16:38), Max: 98.4 (03 Jan 2024 19:49)  HR: 80 (04 Jan 2024 16:38) (80 - 91)  BP: 114/61 (04 Jan 2024 16:38) (91/48 - 114/61)  BP(mean): --  RR: 18 (04 Jan 2024 16:38) (18 - 18)  SpO2: 99% (04 Jan 2024 16:38) (94% - 99%)    Parameters below as of 04 Jan 2024 16:38  Patient On (Oxygen Delivery Method): room air      CAPILLARY BLOOD GLUCOSE          01-03 @ 07:01 - 01-04 @ 07:00  --------------------------------------------------------  IN: 730 mL / OUT: 0 mL / NET: 730 mL    01-04 @ 07:01 - 01-04 @ 17:26  --------------------------------------------------------  IN: 650 mL / OUT: 0 mL / NET: 650 mL      Physical Exam:    Daily     Daily   General:  Well appearing, NAD, not cachetic  HEENT:  Nonicteric, PERRLA  CV:  RRR, S1S2   Lungs:  CTA B/L, no wheezes, rales, rhonchi  Abdomen:  Soft, non-tender, no distended, positive BS  Extremities: RLE : no cyanosis : ecchymosis in thigh                                                                                                                                                                                                                                                                                           LABS:                               8.2    6.90  )-----------( 216      ( 04 Jan 2024 06:53 )             25.9                      01-04    141  |  106  |  19  ----------------------------<  107<H>  3.6   |  25  |  0.60    Ca    8.7      04 Jan 2024 06:51                         RADIOLOGY & ADDITIONAL TESTS         I personally reviewed: [  ]EKG   [  ]CXR    [  ] CT      A/P:         Discussed with :     Ro consultants' Notes   Time spent :

## 2024-01-04 NOTE — PROGRESS NOTE ADULT - REASON FOR ADMISSION
generalized weakness x few days

## 2024-01-04 NOTE — CONSULT NOTE ADULT - ASSESSMENT
83 year old female w/ HTN, GERD, diverticulosis c/b abscess requiring hemicolectomy further c/b empyema requiring thoracotomy and HLD presenting via EMS for episode of diaphoresis and generalized weakness. Of note, recent admission for unwitnessed mechanical fall w/ head strike. Underwent TTE showing insignificant findings. Carotid dopplers w/o stenosis and lower extremity without DVT, had + orthostatics at that time   She mentions that following discharge, she has another fall. She denies loss of consciousness, prodromal symptoms, seizure like activity or urinary/bowel incontinence. She mentions that she felt her feet trip over each other and she went down, denies head trauma.   CTH neg   CT imaging w/ findings of small right and trace left pleural effusions. Subcutaneous hematoma in the proximal right thigh.   orthosetatics this admission neg     Impression:   Fall and syncope, unlikely seizure episode   some of which seem mechanical +/- orthostasis +/- new AFib and less likely mitral stenosis     - cardio recs appreciated   - outpatient routine EEG   - asa and statin   - AC for AF held given thigh hematoma , resume when able    - telemetry  - PT/OT.  - check FS, glucose control <180  - GI/DVT ppx  - Counseling on diet, exercise, and medication adherence was done  - Counseling on smoking cessation and alcohol consumption offered when appropriate.  - Pain assessed and judicious use of narcotics when appropriate was discussed.    - Stroke education given when appropriate.  - Importance of fall prevention discussed.   - Differential diagnosis and plan of care discussed with patient and/or family and primary team  - Thank you for allowing me to participate in the care of this patient. Call with questions.   - outpatient f/u    Ruben Michael MD  Vascular Neurology  Office: [phone]  83 year old female w/ HTN, GERD, diverticulosis c/b abscess requiring hemicolectomy further c/b empyema requiring thoracotomy and HLD presenting via EMS for episode of diaphoresis and generalized weakness. Of note, recent admission for unwitnessed mechanical fall w/ head strike. Underwent TTE showing insignificant findings. Carotid dopplers w/o stenosis and lower extremity without DVT, had + orthostatics at that time   She mentions that following discharge, she has another fall. She denies loss of consciousness, prodromal symptoms, seizure like activity or urinary/bowel incontinence. She mentions that she felt her feet trip over each other and she went down, denies head trauma.   CTH neg   CT imaging w/ findings of small right and trace left pleural effusions. Subcutaneous hematoma in the proximal right thigh.   orthosetatics this admission neg     Impression:   Fall and syncope, unlikely seizure episode   some of which seem mechanical +/- orthostasis +/- new AFib and less likely mitral stenosis     - cardio recs appreciated   - outpatient routine EEG   - asa and statin   - AC for AF held given thigh hematoma , resume when able    - telemetry  - PT/OT.  - check FS, glucose control <180  - GI/DVT ppx  - Counseling on diet, exercise, and medication adherence was done  - Counseling on smoking cessation and alcohol consumption offered when appropriate.  - Pain assessed and judicious use of narcotics when appropriate was discussed.    - Stroke education given when appropriate.  - Importance of fall prevention discussed.   - Differential diagnosis and plan of care discussed with patient and/or family and primary team  - Thank you for allowing me to participate in the care of this patient. Call with questions.   - outpatient f/u    Ruben Michael MD  Vascular Neurology  Office: [phone]

## 2024-01-04 NOTE — DISCHARGE NOTE NURSING/CASE MANAGEMENT/SOCIAL WORK - PATIENT PORTAL LINK FT
You can access the FollowMyHealth Patient Portal offered by [facility] by registering at the following website: [email or web address]. By joining [facility]’s FollowMyHealth portal, you will also be able to view your health information using other applications (apps) compatible with our system. You can access the FollowMyHealth Patient Portal offered by [facility] by registering at the following website: [email or web address]. By joining [facility]’s FollowMyHealth portal, you will also be able to view your health information using other applications (apps) compatible with our system.

## 2024-01-04 NOTE — CONSULT NOTE ADULT - SUBJECTIVE AND OBJECTIVE BOX
Neurology Consult    Reason for Consult: Patient is a 83y old  Female who presents with a chief complaint of generalized weakness x few days (04 Jan 2024 09:26)      HPI:  83 year old female w/ hx of HTN, GERD, diverticulosis c/b abscess requiring hemicolectomy further c/b empyema requiring thoracotomy and HLD presenting via EMS for episode of diaphoresis and generalized weakness. Of note, recent admission for unwitnessed mechanical fall w/ head strike. Able to ambulate after the fall. Underwent TTE showing insignificant findings. Carotid dopplers w/o stenosis and lower extremity without DVT. Discharged with negative workup with follow-up cardiology and neurology. She lives alone and normally does not use assisted devices at home.     She mentions that following discharge, she has another fall. She denies loss of consciousness, prodromal symptoms, seizure like activity or urinary/bowel incontinence. She mentions that she felt her feet trip over each other and she went down, denies head trauma. Was able to get back up without much effort and denies confusion or lack of awareness following event. She presents today due to weakness when she woke up 1/1/24 AM. Denies headaches, dizziness, or other symptoms. No fever, chills. Thought it would be good to come in.     In ED, found to be tachycardic to 130s with atrial fibrillation, BP stable. Labs significant for hg to 7.8. CT imaging w/ findings of small right and trace left pleural effusions. Subcutaneous hematoma in the proximal right thigh.  (02 Jan 2024 02:19)       PAST MEDICAL & SURGICAL HISTORY:  H/O: HTN (hypertension)      H/O: hysterectomy      History of knee replacement      History of total shoulder replacement          Allergies: Allergies    sulfa drugs (Rash)    Intolerances        Social History: Denies toxic habits including tobacco, ETOH or illicit drugs.    Family History: FAMILY HISTORY:  . No family history of strokes    Medications: MEDICATIONS  (STANDING):  artificial  tears Solution 1 Drop(s) Both EYES three times a day  aspirin enteric coated 81 milliGRAM(s) Oral daily  atorvastatin 20 milliGRAM(s) Oral at bedtime  chlorhexidine 2% Cloths 1 Application(s) Topical <User Schedule>  diclofenac 75 milliGRAM(s) Oral daily  influenza  Vaccine (HIGH DOSE) 0.7 milliLiter(s) IntraMuscular once  metoprolol tartrate 12.5 milliGRAM(s) Oral two times a day  misoprostol 200 MICROGram(s) Oral <User Schedule>  oxybutynin 10 milliGRAM(s) Oral two times a day  senna 2 Tablet(s) Oral at bedtime  sodium chloride 2% Ophthalmic Solution 1 Drop(s) Both EYES daily  venlafaxine XR. 150 milliGRAM(s) Oral daily    MEDICATIONS  (PRN):  zolpidem 5 milliGRAM(s) Oral at bedtime PRN Insomnia      Review of Systems:  CONSTITUTIONAL:  No weight loss, fever, chills, weakness or fatigue.  HEENT:  Eyes:  No visual loss, blurred vision, double vision or yellow sclera. Ears, Nose, Throat:  No hearing loss, sneezing, congestion, runny nose or sore throat.  SKIN:  No rash or itching.  CARDIOVASCULAR:  No chest pain, chest pressure or chest discomfort. No palpitations or edema.  RESPIRATORY:  No shortness of breath, cough or sputum.  GASTROINTESTINAL:  No anorexia, nausea, vomiting or diarrhea. No abdominal pain or blood.  GENITOURINARY:  No burning on urination or incontinence   NEUROLOGICAL:  No headache, dizziness, syncope, paralysis, ataxia, numbness or tingling in the extremities. No change in bowel or bladder control. no limb weakness. no vision changes.   MUSCULOSKELETAL:  No muscle, back pain, joint pain or stiffness.  HEMATOLOGIC:  No anemia, bleeding or bruising.  LYMPHATICS:  No enlarged nodes. No history of splenectomy.  PSYCHIATRIC:  No history of depression or anxiety.  ENDOCRINOLOGIC:  No reports of sweating, cold or heat intolerance. No polyuria or polydipsia.      Vitals:  Vital Signs Last 24 Hrs  T(C): 36.5 (04 Jan 2024 03:56), Max: 36.9 (03 Jan 2024 19:49)  T(F): 97.7 (04 Jan 2024 03:56), Max: 98.4 (03 Jan 2024 19:49)  HR: 90 (04 Jan 2024 03:56) (79 - 93)  BP: 91/48 (04 Jan 2024 03:56) (91/48 - 119/70)  BP(mean): --  RR: 18 (04 Jan 2024 03:56) (18 - 18)  SpO2: 94% (04 Jan 2024 03:56) (94% - 99%)    Parameters below as of 04 Jan 2024 03:56  Patient On (Oxygen Delivery Method): room air      Orthostatic VS    01-03-24 @ 11:38  Lying BP: --/-- HR: --   Sitting BP: Orthostatic BP (Sitting Systolic): 95/Orthostatic BP (Sitting Diastolic (mm Hg)): 58 HR: Orthostatic Pulse (Heart Rate (beats/min)): 82  Standing BP: Orthostatic BP (Standing Systolic): 107/Orthostatic BP (Standing Diastolic (mm Hg)): 69 HR: Orthostatic Pulse (Heart Rate (beats/min)): 87  Site: Orthostatic BP/Pulse (Site): upper left arm   Mode: Orthostatic BP/ Pulse (Mode): electronic    01-02-24 @ 17:54  Lying BP: --/-- HR: --   Sitting BP: Orthostatic BP (Sitting Systolic): 121/Orthostatic BP (Sitting Diastolic (mm Hg)): 62 HR: Orthostatic Pulse (Heart Rate (beats/min)): 89  Standing BP: Orthostatic BP (Standing Systolic): 119/Orthostatic BP (Standing Diastolic (mm Hg)): 76 HR: Orthostatic Pulse (Heart Rate (beats/min)): 86  Site: Orthostatic BP/Pulse (Site): upper right arm   Mode: Orthostatic BP/ Pulse (Mode): electronic    General Exam:   General Appearance: Appropriately dressed and in no acute distress       Head: Normocephalic, atraumatic and no dysmorphic features  Ear, Nose, and Throat: Moist mucous membranes  CVS: S1S2+  Resp: No SOB, no wheeze or rhonchi  GI: soft NT/ND  Extremities: No edema or cyanosis  Skin: No bruises or rashes     Neurological Exam:  Mental Status: Awake, alert and oriented x 3.  Able to follow simple and complex verbal commands. Able to name and repeat. fluent speech. No obvious aphasia or dysarthria noted.   Cranial Nerves: PERRL, EOMI, VFFC, sensation V1-V3 intact,  no obvious facial asymmetry, equal elevation of palate, scm/trap 5/5, tongue is midline on protrusion. no obvious papilledema on fundoscopic exam. hearing is grossly intact.   Motor: Normal bulk, tone and strength throughout except RLE pain limited   Sensation: Intact to light touch and pinprick throughout. no right/left confusion. no extinction to tactile on DSS. Romberg was negative.   Reflexes: 1+ throughout at biceps, brachioradialis, triceps, patellars and ankles bilaterally and equal. No clonus. R toe and L toe were both downgoing.  Coordination: No dysmetria on FNF or HKS  Gait: deferred .     Data/Labs/Imaging which I personally reviewed.     Labs:     CBC Full  -  ( 04 Jan 2024 06:53 )  WBC Count : 6.90 K/uL  RBC Count : 2.80 M/uL  Hemoglobin : 8.2 g/dL  Hematocrit : 25.9 %  Platelet Count - Automated : 216 K/uL  Mean Cell Volume : 92.5 fl  Mean Cell Hemoglobin : 29.3 pg  Mean Cell Hemoglobin Concentration : 31.7 gm/dL  Auto Neutrophil # : x  Auto Lymphocyte # : x  Auto Monocyte # : x  Auto Eosinophil # : x  Auto Basophil # : x  Auto Neutrophil % : x  Auto Lymphocyte % : x  Auto Monocyte % : x  Auto Eosinophil % : x  Auto Basophil % : x    01-04    141  |  106  |  19  ----------------------------<  107<H>  3.6   |  25  |  0.60    Ca    8.7      04 Jan 2024 06:51          Urinalysis Basic - ( 04 Jan 2024 06:51 )    Color: x / Appearance: x / SG: x / pH: x  Gluc: 107 mg/dL / Ketone: x  / Bili: x / Urobili: x   Blood: x / Protein: x / Nitrite: x   Leuk Esterase: x / RBC: x / WBC x   Sq Epi: x / Non Sq Epi: x / Bacteria: x      < from: CT Head No Cont (01.01.24 @ 21:05) >    ACC: [id number] EXAM:  CT BRAIN   ORDERED BY:  SILVA ROBERT     PROCEDURE DATE:  01/01/2024          INTERPRETATION:  CLINICAL INFORMATION: Fall    TECHNIQUE: Multiple axial CT images of the calvarium and brain were   obtained without contrast. Sagittal and coronal reformats were obtained.    COMPARISON: CT head 12/28/2023.    FINDINGS:    There is no mass effect, intracranial hemorrhage, or midline shift.    There is no CT evidence of acute territorial infarct.    The ventricles and sulci are appropriate in size and configuration for   patient's stated age.    The imaged portions of the paranasal sinuses and mastoids are well   aerated.    There is no osseous abnormality.  Bilateral lens replacement.    IMPRESSION:    No intracranial hemorrhage, mass effect, or midline shift.    --- End of Report ---            DL TAMAYO MD; Attending Radiologist  This document has been electronically signed. Jan 1 2024  9:38PM    < end of copied text >       Neurology Consult    Reason for Consult: Patient is a 83y old  Female who presents with a chief complaint of generalized weakness x few days (04 Jan 2024 09:26)      HPI:  83 year old female w/ hx of HTN, GERD, diverticulosis c/b abscess requiring hemicolectomy further c/b empyema requiring thoracotomy and HLD presenting via EMS for episode of diaphoresis and generalized weakness. Of note, recent admission for unwitnessed mechanical fall w/ head strike. Able to ambulate after the fall. Underwent TTE showing insignificant findings. Carotid dopplers w/o stenosis and lower extremity without DVT. Discharged with negative workup with follow-up cardiology and neurology. She lives alone and normally does not use assisted devices at home.     She mentions that following discharge, she has another fall. She denies loss of consciousness, prodromal symptoms, seizure like activity or urinary/bowel incontinence. She mentions that she felt her feet trip over each other and she went down, denies head trauma. Was able to get back up without much effort and denies confusion or lack of awareness following event. She presents today due to weakness when she woke up 1/1/24 AM. Denies headaches, dizziness, or other symptoms. No fever, chills. Thought it would be good to come in.     In ED, found to be tachycardic to 130s with atrial fibrillation, BP stable. Labs significant for hg to 7.8. CT imaging w/ findings of small right and trace left pleural effusions. Subcutaneous hematoma in the proximal right thigh.  (02 Jan 2024 02:19)       PAST MEDICAL & SURGICAL HISTORY:  H/O: HTN (hypertension)      H/O: hysterectomy      History of knee replacement      History of total shoulder replacement          Allergies: Allergies    sulfa drugs (Rash)    Intolerances        Social History: Denies toxic habits including tobacco, ETOH or illicit drugs.    Family History: FAMILY HISTORY:  . No family history of strokes    Medications: MEDICATIONS  (STANDING):  artificial  tears Solution 1 Drop(s) Both EYES three times a day  aspirin enteric coated 81 milliGRAM(s) Oral daily  atorvastatin 20 milliGRAM(s) Oral at bedtime  chlorhexidine 2% Cloths 1 Application(s) Topical <User Schedule>  diclofenac 75 milliGRAM(s) Oral daily  influenza  Vaccine (HIGH DOSE) 0.7 milliLiter(s) IntraMuscular once  metoprolol tartrate 12.5 milliGRAM(s) Oral two times a day  misoprostol 200 MICROGram(s) Oral <User Schedule>  oxybutynin 10 milliGRAM(s) Oral two times a day  senna 2 Tablet(s) Oral at bedtime  sodium chloride 2% Ophthalmic Solution 1 Drop(s) Both EYES daily  venlafaxine XR. 150 milliGRAM(s) Oral daily    MEDICATIONS  (PRN):  zolpidem 5 milliGRAM(s) Oral at bedtime PRN Insomnia      Review of Systems:  CONSTITUTIONAL:  No weight loss, fever, chills, weakness or fatigue.  HEENT:  Eyes:  No visual loss, blurred vision, double vision or yellow sclera. Ears, Nose, Throat:  No hearing loss, sneezing, congestion, runny nose or sore throat.  SKIN:  No rash or itching.  CARDIOVASCULAR:  No chest pain, chest pressure or chest discomfort. No palpitations or edema.  RESPIRATORY:  No shortness of breath, cough or sputum.  GASTROINTESTINAL:  No anorexia, nausea, vomiting or diarrhea. No abdominal pain or blood.  GENITOURINARY:  No burning on urination or incontinence   NEUROLOGICAL:  No headache, dizziness, syncope, paralysis, ataxia, numbness or tingling in the extremities. No change in bowel or bladder control. no limb weakness. no vision changes.   MUSCULOSKELETAL:  No muscle, back pain, joint pain or stiffness.  HEMATOLOGIC:  No anemia, bleeding or bruising.  LYMPHATICS:  No enlarged nodes. No history of splenectomy.  PSYCHIATRIC:  No history of depression or anxiety.  ENDOCRINOLOGIC:  No reports of sweating, cold or heat intolerance. No polyuria or polydipsia.      Vitals:  Vital Signs Last 24 Hrs  T(C): 36.5 (04 Jan 2024 03:56), Max: 36.9 (03 Jan 2024 19:49)  T(F): 97.7 (04 Jan 2024 03:56), Max: 98.4 (03 Jan 2024 19:49)  HR: 90 (04 Jan 2024 03:56) (79 - 93)  BP: 91/48 (04 Jan 2024 03:56) (91/48 - 119/70)  BP(mean): --  RR: 18 (04 Jan 2024 03:56) (18 - 18)  SpO2: 94% (04 Jan 2024 03:56) (94% - 99%)    Parameters below as of 04 Jan 2024 03:56  Patient On (Oxygen Delivery Method): room air      Orthostatic VS    01-03-24 @ 11:38  Lying BP: --/-- HR: --   Sitting BP: Orthostatic BP (Sitting Systolic): 95/Orthostatic BP (Sitting Diastolic (mm Hg)): 58 HR: Orthostatic Pulse (Heart Rate (beats/min)): 82  Standing BP: Orthostatic BP (Standing Systolic): 107/Orthostatic BP (Standing Diastolic (mm Hg)): 69 HR: Orthostatic Pulse (Heart Rate (beats/min)): 87  Site: Orthostatic BP/Pulse (Site): upper left arm   Mode: Orthostatic BP/ Pulse (Mode): electronic    01-02-24 @ 17:54  Lying BP: --/-- HR: --   Sitting BP: Orthostatic BP (Sitting Systolic): 121/Orthostatic BP (Sitting Diastolic (mm Hg)): 62 HR: Orthostatic Pulse (Heart Rate (beats/min)): 89  Standing BP: Orthostatic BP (Standing Systolic): 119/Orthostatic BP (Standing Diastolic (mm Hg)): 76 HR: Orthostatic Pulse (Heart Rate (beats/min)): 86  Site: Orthostatic BP/Pulse (Site): upper right arm   Mode: Orthostatic BP/ Pulse (Mode): electronic    General Exam:   General Appearance: Appropriately dressed and in no acute distress       Head: Normocephalic, atraumatic and no dysmorphic features  Ear, Nose, and Throat: Moist mucous membranes  CVS: S1S2+  Resp: No SOB, no wheeze or rhonchi  GI: soft NT/ND  Extremities: No edema or cyanosis  Skin: No bruises or rashes     Neurological Exam:  Mental Status: Awake, alert and oriented x 3.  Able to follow simple and complex verbal commands. Able to name and repeat. fluent speech. No obvious aphasia or dysarthria noted.   Cranial Nerves: PERRL, EOMI, VFFC, sensation V1-V3 intact,  no obvious facial asymmetry, equal elevation of palate, scm/trap 5/5, tongue is midline on protrusion. no obvious papilledema on fundoscopic exam. hearing is grossly intact.   Motor: Normal bulk, tone and strength throughout except RLE pain limited   Sensation: Intact to light touch and pinprick throughout. no right/left confusion. no extinction to tactile on DSS. Romberg was negative.   Reflexes: 1+ throughout at biceps, brachioradialis, triceps, patellars and ankles bilaterally and equal. No clonus. R toe and L toe were both downgoing.  Coordination: No dysmetria on FNF or HKS  Gait: deferred .     Data/Labs/Imaging which I personally reviewed.     Labs:     CBC Full  -  ( 04 Jan 2024 06:53 )  WBC Count : 6.90 K/uL  RBC Count : 2.80 M/uL  Hemoglobin : 8.2 g/dL  Hematocrit : 25.9 %  Platelet Count - Automated : 216 K/uL  Mean Cell Volume : 92.5 fl  Mean Cell Hemoglobin : 29.3 pg  Mean Cell Hemoglobin Concentration : 31.7 gm/dL  Auto Neutrophil # : x  Auto Lymphocyte # : x  Auto Monocyte # : x  Auto Eosinophil # : x  Auto Basophil # : x  Auto Neutrophil % : x  Auto Lymphocyte % : x  Auto Monocyte % : x  Auto Eosinophil % : x  Auto Basophil % : x    01-04    141  |  106  |  19  ----------------------------<  107<H>  3.6   |  25  |  0.60    Ca    8.7      04 Jan 2024 06:51          Urinalysis Basic - ( 04 Jan 2024 06:51 )    Color: x / Appearance: x / SG: x / pH: x  Gluc: 107 mg/dL / Ketone: x  / Bili: x / Urobili: x   Blood: x / Protein: x / Nitrite: x   Leuk Esterase: x / RBC: x / WBC x   Sq Epi: x / Non Sq Epi: x / Bacteria: x      < from: CT Head No Cont (01.01.24 @ 21:05) >    ACC: [id number] EXAM:  CT BRAIN   ORDERED BY:  SILVA ROBERT     PROCEDURE DATE:  01/01/2024          INTERPRETATION:  CLINICAL INFORMATION: Fall    TECHNIQUE: Multiple axial CT images of the calvarium and brain were   obtained without contrast. Sagittal and coronal reformats were obtained.    COMPARISON: CT head 12/28/2023.    FINDINGS:    There is no mass effect, intracranial hemorrhage, or midline shift.    There is no CT evidence of acute territorial infarct.    The ventricles and sulci are appropriate in size and configuration for   patient's stated age.    The imaged portions of the paranasal sinuses and mastoids are well   aerated.    There is no osseous abnormality.  Bilateral lens replacement.    IMPRESSION:    No intracranial hemorrhage, mass effect, or midline shift.    --- End of Report ---            DL TAMAYO MD; Attending Radiologist  This document has been electronically signed. Jan 1 2024  9:38PM    < end of copied text >

## 2024-01-04 NOTE — DISCHARGE NOTE NURSING/CASE MANAGEMENT/SOCIAL WORK - NSDCPEFALRISK_GEN_ALL_CORE
For information on Fall & Injury Prevention, visit: [email or web address] OR  [email or web address] OR  https://www.cdc.gov/steadi/patient.html For information on Fall & Injury Prevention, visit: [email or web address] OR  [email or web address] OR  https://www.cdc.gov/steadi/patient.html

## 2024-01-04 NOTE — PROGRESS NOTE ADULT - SUBJECTIVE AND OBJECTIVE BOX
Cardiovascular Disease Progress Note  Date of service: 01-04-24 @ 09:26    Overnight events: No acute events overnight.  Patient is in no distress. Denies chest pain or SOB.   Otherwise review of systems negative    Objective Findings:  T(C): 36.5 (01-04-24 @ 03:56), Max: 36.9 (01-03-24 @ 19:49)  HR: 90 (01-04-24 @ 03:56) (79 - 93)  BP: 91/48 (01-04-24 @ 03:56) (91/48 - 119/70)  RR: 18 (01-04-24 @ 03:56) (18 - 18)  SpO2: 94% (01-04-24 @ 03:56) (94% - 99%)  Wt(kg): --  Daily     Daily       Physical Exam:  Gen: NAD; Patient resting comfortably  HEENT: EOMI, Normocephalic/ atraumatic  CV: RRR, normal S1 + S2, no m/r/g  Lungs:  Normal respiratory effort; clear to auscultation bilaterally  Abd: soft, non-tender; bowel sounds present  Ext: No edema; warm and well perfused    Telemetry:  Sinus with frequent PACs    Laboratory Data:                        8.2    6.90  )-----------( 216      ( 04 Jan 2024 06:53 )             25.9     01-04    141  |  106  |  19  ----------------------------<  107<H>  3.6   |  25  |  0.60    Ca    8.7      04 Jan 2024 06:51                Inpatient Medications:  MEDICATIONS  (STANDING):  artificial  tears Solution 1 Drop(s) Both EYES three times a day  aspirin enteric coated 81 milliGRAM(s) Oral daily  atorvastatin 20 milliGRAM(s) Oral at bedtime  chlorhexidine 2% Cloths 1 Application(s) Topical <User Schedule>  diclofenac 75 milliGRAM(s) Oral daily  influenza  Vaccine (HIGH DOSE) 0.7 milliLiter(s) IntraMuscular once  metoprolol tartrate 12.5 milliGRAM(s) Oral two times a day  misoprostol 200 MICROGram(s) Oral <User Schedule>  oxybutynin 10 milliGRAM(s) Oral two times a day  senna 2 Tablet(s) Oral at bedtime  sodium chloride 2% Ophthalmic Solution 1 Drop(s) Both EYES daily  venlafaxine XR. 150 milliGRAM(s) Oral daily      Assessment:  83 year old female w/ hx of HTN, GERD, diverticulosis c/b abscess requiring hemicolectomy and HLD presenting via EMS for episode of diaphoresis and generalized weakness.     Plan of Care:      #Afib  - New onset  - On imaging Ms. Tucker is found to have a subcutaneous hematoma over the R greater trochanter 2/2 recent fall along with R patella fracture  - Will hold off on AC at this time   - Recent Echo showed a hyperdynamic LV with mild to moderate Mitral stenosis.   - Orthostatics negative.   - Orthopedics input appreciated. No surgical intervention planned at this time.   - From  a cardiac perspective, Ms. Tucker is stable for discharge. I recommend she follow up with her cardiologist Dr. Traube for further management.   - Ms. Tucker is having frequent PACs on monitor. Patient may benefit from outpatient Holter monitoring  - Given her frequent falls and hematoma, I will hold off on AC initiation at the moment. Will defer to outpatient cardiology for initiation of AC.   - Ms. Tucker and her daughter are also hesitant to start at this time.     #HTN  - BP acceptable    #HLD   - Statin therapy      Over 25 minutes spent on total encounter; more than 50% of the visit was spent counseling and/or coordinating care by the attending physician.      Jan Garrison DO [facility]  Cardiovascular Disease  [phone] Cardiovascular Disease Progress Note  Date of service: 01-04-24 @ 09:26    Overnight events: No acute events overnight.  Patient is in no distress. Denies chest pain or SOB.   Otherwise review of systems negative    Objective Findings:  T(C): 36.5 (01-04-24 @ 03:56), Max: 36.9 (01-03-24 @ 19:49)  HR: 90 (01-04-24 @ 03:56) (79 - 93)  BP: 91/48 (01-04-24 @ 03:56) (91/48 - 119/70)  RR: 18 (01-04-24 @ 03:56) (18 - 18)  SpO2: 94% (01-04-24 @ 03:56) (94% - 99%)  Wt(kg): --  Daily     Daily       Physical Exam:  Gen: NAD; Patient resting comfortably  HEENT: EOMI, Normocephalic/ atraumatic  CV: RRR, normal S1 + S2, no m/r/g  Lungs:  Normal respiratory effort; clear to auscultation bilaterally  Abd: soft, non-tender; bowel sounds present  Ext: No edema; warm and well perfused    Telemetry:  Sinus with frequent PACs    Laboratory Data:                        8.2    6.90  )-----------( 216      ( 04 Jan 2024 06:53 )             25.9     01-04    141  |  106  |  19  ----------------------------<  107<H>  3.6   |  25  |  0.60    Ca    8.7      04 Jan 2024 06:51                Inpatient Medications:  MEDICATIONS  (STANDING):  artificial  tears Solution 1 Drop(s) Both EYES three times a day  aspirin enteric coated 81 milliGRAM(s) Oral daily  atorvastatin 20 milliGRAM(s) Oral at bedtime  chlorhexidine 2% Cloths 1 Application(s) Topical <User Schedule>  diclofenac 75 milliGRAM(s) Oral daily  influenza  Vaccine (HIGH DOSE) 0.7 milliLiter(s) IntraMuscular once  metoprolol tartrate 12.5 milliGRAM(s) Oral two times a day  misoprostol 200 MICROGram(s) Oral <User Schedule>  oxybutynin 10 milliGRAM(s) Oral two times a day  senna 2 Tablet(s) Oral at bedtime  sodium chloride 2% Ophthalmic Solution 1 Drop(s) Both EYES daily  venlafaxine XR. 150 milliGRAM(s) Oral daily      Assessment:  83 year old female w/ hx of HTN, GERD, diverticulosis c/b abscess requiring hemicolectomy and HLD presenting via EMS for episode of diaphoresis and generalized weakness.     Plan of Care:      #Afib  - New onset  - On imaging Ms. Tucker is found to have a subcutaneous hematoma over the R greater trochanter 2/2 recent fall along with R patella fracture  - Will hold off on AC at this time   - Recent Echo showed a hyperdynamic LV with mild to moderate Mitral stenosis.   - Orthostatics negative.   - Orthopedics input appreciated. No surgical intervention planned at this time.   - From  a cardiac perspective, Ms. Tucker is stable for discharge. I recommend she follow up with her cardiologist Dr. Traube for further management.   - Ms. Tucker is having frequent PACs on monitor. Patient may benefit from outpatient Holter monitoring  - Given her frequent falls and hematoma, I will hold off on AC initiation at the moment. Will defer to outpatient cardiology for initiation of AC.   - Ms. Tucker and her daughter are also hesitant to start at this time.     #HTN  - BP acceptable    #HLD   - Statin therapy      Over 25 minutes spent on total encounter; more than 50% of the visit was spent counseling and/or coordinating care by the attending physician.      Jan Garrison DO [facility]  Cardiovascular Disease  [phone]

## 2024-02-27 NOTE — ED ADULT TRIAGE NOTE - CCCP TRG CHIEF CMPLNT
Targets for pregnant women with diabetes:  A1c <6.0%  Fasting BG <95  1hr post-prandial <140  2hr post-prandial <120    Thank you for allowing us to take care of you today!    Please call the clinic 1 week after having any blood tests or imaging to find out your results.     You may receive a survey after your visit today.  We would appreciate your feedback!  
increased lethargy

## 2024-06-04 NOTE — PHYSICAL THERAPY INITIAL EVALUATION ADULT - LEVEL OF INDEPENDENCE: GAIT, REHAB EVAL
GYN Vikki Cartwright MD  Obstetrician/Gynecologist      
Patient is here today for her 7 week postoperative visit.  She had  Laparoscopic converted to total abdominal hysterectomy, bilateral salpingectomy on 4/12/24.      Currently she reports doing well . Has noticed some numbness above incision.     Seeing pelvic floor therapy once a week.      Primary care provider: Christopher Pierce MD     Latex:  Patient denies allergy to latex.  Medications reviewed with patient.  Tobacco use verified.  Allergies verified.    Patient would like communication of their results via:   [employer]    Patient's current [facility] status: Active.     Chaperone present during sensitive exam: No, patient declined chaperone.  
supervision

## 2024-09-23 NOTE — DISCHARGE NOTE NURSING/CASE MANAGEMENT/SOCIAL WORK - FLU SEASON?
A few days after your discharge, one of our care coordinators may be calling you to see how things have been going at home. This phone call also gives you the opportunity to clarify any information on your discharge instructions or ask any questions that may have come up since leaving the hospital.         Laparoscopic R Salpingectomy Discharge Instructions    Medications and Pain Management  Common areas of pain after laparoscopic salpingectomy include the incision pain, pain in between your shoulder blades, the pelvis and lower back. The gas that was used to distend your abdomen for the surgery is absorbed slowly into your blood stream over the first 3-4 days after surgery. It is not passed intestinally, although, because your abdomen is distended, it may feel similar to intestinal gas. Staying active and walking is the best way to promote the absorption of this gas.  Immediately after surgery, nerve pain is the most intense, typically for the first 6 to 12 hours. As the body heals, it creates inflammation around the incisions sites adding pressure and creating soreness. After 5 days, the inflammation begins to recede and significant improvement in soreness is expected. Pulling on the incisions, especially if sudden, such as when you cough, will reactivate the nerve pain. Support your abdomen with a pillow during coughing or sneezing as this will be helpful to minimize pain. There are two types of pain pills typically used for post-operative pain management, narcotics such as tramadol and an anti-inflammatory such as Ibuprofen or Naprosyn.    Taking regular anti-inflammatory pills, such as 600mg of Ibuprofen every 6 hours for the first 5 days and then as needed is recommended.   You can alternate ibuprofen with tylenol (975mg or 1000mg). The tylenol can be taken every 6 hours.    If you have problems using NSAIDs, be sure to discuss this with your doctor.   The narcotic can be used on a schedule for the first 1 to  2 days but after that, only as you need it. Narcotics can cause constipation, nausea, sleepiness and headaches.   You may begin your usual home medications as you were taking before unless directed by your doctor.    Incisional care  Your incisions were closed with dissolvable suture and surgical tape. The sutures will slowly dissolve over 4-6 weeks and the glue will fall off on its own. You may shower and use a mild soap around the incisions and pat dry. Do not use a washcloth or scrub the incisions. Using peroxide or antiseptics is not recommended for routine care. Avoid hot and steamy showers as this may cause you to feel faint. There may be discoloration or bruising around the incisions. This is normal and may take several weeks to resolve. Firmness or a nodular area under the skin near the incision may represent a collection of blood, this too will resolve on its own after a little time. If any incision develops tenderness, redness in the skin layer or has drainage please call the office.    Vaginal Discharge  You may have occasional spotting for a few days. If you are having bleeding like a period, that is abnormal and you should contact your doctor.    GI Function, Nausea and Constipation  Nausea can occasionally be an issue in the first few days after surgery. It is usually caused as a side effect from the anesthesia and pain medicine, particularly narcotics. Taking the pain pills with food is a food way to proactively minimize this. Throwing up, especially after the first day, is not expected and if this happens, you should call your doctor. Feeling gassy and constipation can be a problem for the first week after surgery. Limiting the use of narcotics may be helpful. Stool softeners twice a day and a high fiber diet are safe. If needed, Miralax once daily is a good choice. If no bowel movement after 3 days, you will need to increase the Miralax until soft, regular bowel movements are  Yes... passing.    Urinating  A sol catheter was placed during the procedure. It can irritate the urethra and may feel sensitive for the first 1-2 days post operatively. Stay hydrated and empty your bladder often. If it feels like you have a urinary tract infection, please contact your doctor.    Activity  For the first two days post-operatively, your soreness and recovery from anesthesia will limit your activity  Stairs are safe, just take your time  At a minimum during this time, you should walk around for 10-15 minutes every 2-3 hours. After that, in the first week, any activity except for overt exercise is safe.   During the first week you should not commit to being on your feet for more than 30 minutes at a time.   During the second week, light exercise is encouraged.  After 2 weeks from surgery, you should try to get back into regular activity other than heavy lifting.   Driving is usually okay after the first few days. You must be able to comfortably wear a seatbelt, press the gas/brake pedals, and drive defensively. You may not drive while taking narcotic pain medicines.    When to Call the Doctor  Call for any fever above 100.4 F (If you do not feel feverish you do not have to routinely check your temperature.)  Call for severe pain not improved by medications  Call for persistent nausea, vomiting  Call for vaginal bleeding that is heavy as a period or passing blood clots larger than a quarter  Call for unusual swelling in your legs  Call if the incisions develop painful redness and discharge

## 2025-01-21 NOTE — PROGRESS NOTE ADULT - ASSESSMENT
Please appeal. Patient has Severe active ANCA associated Vasculitis.         83 y/o female PMHx HTN, divertic c/b abscess requiring hemicolectomy further c/b empyema requiring thoracotomy  HLD, PSx hysterectomy, b/l shoulder replacement, b/l knee replacement now presenting to the ED via EMS s/p unwitnessed fall today now c/o RUE pain. Patient reported she had mechanical fall onto right side this morning with carpeted floor  this morning with headstrike and was assisted back into bed by EMS. Patient then had another fall but cannot recall how or when she fell. Patient was able to ambulate after the fall. Does not ambulate with assisted devices inside the house and lives by herself with family nearby. Denied CP, SOB, abdominal pain, N/V/D, headache, fever, cough, urinary symptoms, urinary fecal incontinence, back pain, neck pain, pelvic pain       Problem/Plan - 1:  ·  Problem: Multiple falls.   ·  Plan: < from: CT 3D Reconstruct w/ Workstation (12.28.23 @ 17:29) >      IMPRESSION:    CT HEAD: No acute intracranial hemorrhage    CT MAXILLOFACIAL: No acute facial or orbital fracture.    < end of copied text >.     Problem/Plan - 2:  ·  Problem: Syncope.   ·  Plan: CT head noted.   Admitted to Tele to R/O Arrythmia .  Cards and Neurology help appreciated.   < from: TTE W or WO Ultrasound Enhancing Agent (12.29.23 @ 11:11) >  CONCLUSIONS:      1. Left ventricular cavity is normal. Left ventricular wall thickness is normal. Left ventricular systolic function is normal with an ejection fraction of 74 % by Rosenbaum's method of disks. There are no regional wall motion abnormalities seen.   2. There is moderate (grade 2) left ventricular diastolic dysfunction, with normal filling pressure.   3. Normal right ventricular cavity size, wall thickness, and systolic function.   4. The left atrium is severely dilated.   5. Moderate mitral regurgitation.   6. Mild to moderate mitral valve stenosis.   7. No pericardial effusion seen.   8. Estimated pulmonary artery systolic pressure is 40 mmHg.   9. No prior echocardiogram is available for comparison.    < end of copied text >     Problem/Plan - 3:  ·  Problem: HTN (hypertension).   ·  Plan: BP meds with hold parameters.     Problem/Plan - 4:  ·  Problem: Anemia .   ·  Plan: No evidence of active bleeding. Said I used to give blood but was told my blood is low so cant give.   I have seen my GP and GI for it .  I will not stay but will definitely see my PCP as well GI  and if my GP says then hematology.   I was AMT so know ever thing . I will not stay .  Said my daughter in NP but can not talk till sundown. I  gave her my card and will call me.     Dispo: LIS planning home as adamant to go today.